# Patient Record
Sex: FEMALE | Race: WHITE | Employment: FULL TIME | URBAN - METROPOLITAN AREA
[De-identification: names, ages, dates, MRNs, and addresses within clinical notes are randomized per-mention and may not be internally consistent; named-entity substitution may affect disease eponyms.]

---

## 2023-10-26 ENCOUNTER — NURSING HOME VISIT (OUTPATIENT)
Dept: POST ACUTE CARE | Facility: EXTERNAL LOCATION | Age: 78
End: 2023-10-26
Payer: MEDICARE

## 2023-10-26 DIAGNOSIS — G40.909 SEIZURE DISORDER (MULTI): Primary | ICD-10-CM

## 2023-10-26 DIAGNOSIS — R53.81 PHYSICAL DECONDITIONING: ICD-10-CM

## 2023-10-26 DIAGNOSIS — K21.9 GASTROESOPHAGEAL REFLUX DISEASE WITHOUT ESOPHAGITIS: ICD-10-CM

## 2023-10-26 DIAGNOSIS — Z87.898 HISTORY OF ABDOMINAL ABSCESS: ICD-10-CM

## 2023-10-26 DIAGNOSIS — E78.5 HYPERLIPIDEMIA, UNSPECIFIED HYPERLIPIDEMIA TYPE: ICD-10-CM

## 2023-10-26 DIAGNOSIS — M15.9 PRIMARY OSTEOARTHRITIS INVOLVING MULTIPLE JOINTS: ICD-10-CM

## 2023-10-26 DIAGNOSIS — E03.9 HYPOTHYROIDISM, UNSPECIFIED TYPE: ICD-10-CM

## 2023-10-26 DIAGNOSIS — F32.9 MAJOR DEPRESSIVE DISORDER, REMISSION STATUS UNSPECIFIED, UNSPECIFIED WHETHER RECURRENT: ICD-10-CM

## 2023-10-26 PROBLEM — M15.0 PRIMARY OSTEOARTHRITIS INVOLVING MULTIPLE JOINTS: Status: ACTIVE | Noted: 2023-10-26

## 2023-10-26 PROCEDURE — 99308 SBSQ NF CARE LOW MDM 20: CPT | Performed by: NURSE PRACTITIONER

## 2023-10-26 RX ORDER — SIMVASTATIN 20 MG/1
1 TABLET, FILM COATED ORAL EVERY EVENING
COMMUNITY
Start: 2013-07-01

## 2023-10-26 RX ORDER — ESCITALOPRAM OXALATE 5 MG/1
1 TABLET ORAL DAILY
COMMUNITY
Start: 2022-01-20

## 2023-10-26 RX ORDER — LEVOTHYROXINE SODIUM 88 UG/1
1 TABLET ORAL DAILY
COMMUNITY

## 2023-10-26 RX ORDER — PANTOPRAZOLE SODIUM 40 MG/1
1 TABLET, DELAYED RELEASE ORAL DAILY
COMMUNITY
Start: 2022-04-05

## 2023-10-26 RX ORDER — POLYVINYL ALCOHOL 14 MG/ML
1 SOLUTION/ DROPS OPHTHALMIC 4 TIMES DAILY
COMMUNITY
End: 2024-01-30 | Stop reason: WASHOUT

## 2023-10-26 RX ORDER — CHOLECALCIFEROL (VITAMIN D3) 25 MCG
3000 TABLET ORAL DAILY
COMMUNITY

## 2023-10-26 RX ORDER — LACOSAMIDE 100 MG/1
100 TABLET ORAL EVERY 12 HOURS
COMMUNITY
Start: 2013-04-12 | End: 2024-03-11 | Stop reason: SDUPTHER

## 2023-10-26 RX ORDER — LEVETIRACETAM 1000 MG/1
1 TABLET ORAL 2 TIMES DAILY
COMMUNITY
Start: 2014-06-09 | End: 2024-03-11 | Stop reason: SDUPTHER

## 2023-10-26 ASSESSMENT — ENCOUNTER SYMPTOMS
ARTHRALGIAS: 1
TROUBLE SWALLOWING: 0
SHORTNESS OF BREATH: 0
DYSPHORIC MOOD: 0
UNEXPECTED WEIGHT CHANGE: 0
APPETITE CHANGE: 0
DYSURIA: 0
COUGH: 0
SEIZURES: 0
BACK PAIN: 1
WEAKNESS: 1
SLEEP DISTURBANCE: 0
ABDOMINAL PAIN: 1

## 2023-10-26 NOTE — PROGRESS NOTES
Reason for visit: fu seizures, ltac, oa, hypothyroid, phys deconditioning    Subjective   HPI:  Ms. Soliz is 78 y.o. year old female in [facility] under long term care .  Seen today in room, denies new co, says her knees are painful but going for injections next week. No abd pain, no bleeding. Working with therapy 3x per week, says she would like to eventually go home.     Review of Systems   Constitutional:  Negative for appetite change and unexpected weight change.   HENT:  Negative for trouble swallowing.    Respiratory:  Negative for cough and shortness of breath.    Cardiovascular:  Negative for chest pain and leg swelling.   Gastrointestinal:  Positive for abdominal pain.   Genitourinary:  Negative for dysuria.   Musculoskeletal:  Positive for arthralgias, back pain (when lies flat) and gait problem.   Neurological:  Positive for weakness. Negative for seizures.   Psychiatric/Behavioral:  Negative for dysphoric mood and sleep disturbance.        Objective   VS: reviewed in point click care Current Vitals     BP: 165/76 mmHg  10/21/2023 20:51    Temp:97.7 °F  10/21/2023 20:51  Pulse:85 bpm  10/21/2023 20:51    Weight:180 Lbs  10/3/2023 10:45  Resp:16 Breaths/min  10/21/2023 20:51  BS:198 mg/dL  7/30/2022 14:43  O2:97 %  10/7/2023 15:00  Pain:0  10/5/2023 21:09      Physical Exam  Constitutional:       General: She is not in acute distress.  HENT:      Head: Normocephalic and atraumatic.      Nose: Nose normal.      Mouth/Throat:      Mouth: Mucous membranes are moist.   Eyes:      Conjunctiva/sclera: Conjunctivae normal.   Pulmonary:      Effort: Pulmonary effort is normal.   Abdominal:      General: Abdomen is flat.   Musculoskeletal:      Cervical back: Normal range of motion and neck supple.      Right knee: Swelling and deformity present.      Left knee: Swelling and deformity present.   Skin:     General: Skin is warm and dry.   Neurological:      Mental Status: She is alert and oriented to person, place,  and time.   Psychiatric:         Mood and Affect: Mood normal.       Lab work  705008 TSH and T4 within normal limits  420584 glucose 124, creatinine 0.5 CBC was normal   132347 vitamin D normal  757031 HDL 44      Assessment/Plan   1. Seizure disorder (CMS/HCC)  Continue meds, no sz    2. Hypothyroidism, unspecified type  Last levels stable 491309, recheck 6 mo  Continue levothyroxine    3. Hyperlipidemia, unspecified hyperlipidemia type  Cont statin, last levels stable 6/2023, recheck 1 yr    4. Major depressive disorder, remission status unspecified, unspecified whether recurrent  Continue escitalopram  May have some worsening of depression if her hopes of going home are not realized, monitor    5. Gastroesophageal reflux disease without esophagitis  ppi    6. Primary osteoarthritis involving multiple joints  Plan for injections next week to knees  Tylenol prn not used often    7. Physical deconditioning  Says she will have therapy MWF    8. History of abdominal abscess  No current concerns      Med review  - reviewed in [facility] and updated in EPIC    Code status  [place] code status: DNRCCA    Dispo: stable, fu in 1 month or earlier prn for issues. Monitor for any increase in depression.     Monica Espinosa, APRN-CNP  10/26/23

## 2023-10-26 NOTE — LETTER
Patient: Shikha Soliz  : 1945    Encounter Date: 10/26/2023    Reason for visit: fu seizures, ltac, oa, hypothyroid, phys deconditioning    Subjective  HPI:  Ms. Soliz is 78 y.o. year old female in [facility] under long term care .  Seen today in room, denies new co, says her knees are painful but going for injections next week. No abd pain, no bleeding. Working with therapy 3x per week, says she would like to eventually go home.     Review of Systems   Constitutional:  Negative for appetite change and unexpected weight change.   HENT:  Negative for trouble swallowing.    Respiratory:  Negative for cough and shortness of breath.    Cardiovascular:  Negative for chest pain and leg swelling.   Gastrointestinal:  Positive for abdominal pain.   Genitourinary:  Negative for dysuria.   Musculoskeletal:  Positive for arthralgias, back pain (when lies flat) and gait problem.   Neurological:  Positive for weakness. Negative for seizures.   Psychiatric/Behavioral:  Negative for dysphoric mood and sleep disturbance.        Objective  VS: reviewed in point click care Current Vitals     BP: 165/76 mmHg  10/21/2023 20:51    Temp:97.7 °F  10/21/2023 20:51  Pulse:85 bpm  10/21/2023 20:51    Weight:180 Lbs  10/3/2023 10:45  Resp:16 Breaths/min  10/21/2023 20:51  BS:198 mg/dL  2022 14:43  O2:97 %  10/7/2023 15:00  Pain:0  10/5/2023 21:09      Physical Exam  Constitutional:       General: She is not in acute distress.  HENT:      Head: Normocephalic and atraumatic.      Nose: Nose normal.      Mouth/Throat:      Mouth: Mucous membranes are moist.   Eyes:      Conjunctiva/sclera: Conjunctivae normal.   Pulmonary:      Effort: Pulmonary effort is normal.   Abdominal:      General: Abdomen is flat.   Musculoskeletal:      Cervical back: Normal range of motion and neck supple.      Right knee: Swelling and deformity present.      Left knee: Swelling and deformity present.   Skin:     General: Skin is warm and dry.    Neurological:      Mental Status: She is alert and oriented to person, place, and time.   Psychiatric:         Mood and Affect: Mood normal.       Lab work  177662 TSH and T4 within normal limits  525462 glucose 124, creatinine 0.5 CBC was normal   624902 vitamin D normal  608402 HDL 44      Assessment/Plan  1. Seizure disorder (CMS/HCC)  Continue meds, no sz    2. Hypothyroidism, unspecified type  Last levels stable [id number], recheck 6 mo  Continue levothyroxine    3. Hyperlipidemia, unspecified hyperlipidemia type  Cont statin, last levels stable 6/2023, recheck 1 yr    4. Major depressive disorder, remission status unspecified, unspecified whether recurrent  Continue escitalopram  May have some worsening of depression if her hopes of going home are not realized, monitor    5. Gastroesophageal reflux disease without esophagitis  ppi    6. Primary osteoarthritis involving multiple joints  Plan for injections next week to knees  Tylenol prn not used often    7. Physical deconditioning  Says she will have therapy MWF    8. History of abdominal abscess  No current concerns      Med review  - reviewed in [facility] and updated in EPIC    Code status  [place] code status: DNRCCA    Dispo: stable, fu in 1 month or earlier prn for issues. Monitor for any increase in depression.     SEVEN Jama  10/26/23      Electronically Signed By: SEVEN Jama   10/26/23 12:06 PM

## 2023-11-03 ENCOUNTER — OFFICE VISIT (OUTPATIENT)
Dept: ORTHOPEDIC SURGERY | Facility: HOSPITAL | Age: 78
End: 2023-11-03
Payer: MEDICARE

## 2023-11-03 ENCOUNTER — HOSPITAL ENCOUNTER (OUTPATIENT)
Dept: RADIOLOGY | Facility: HOSPITAL | Age: 78
Discharge: HOME | End: 2023-11-03
Payer: MEDICARE

## 2023-11-03 VITALS — HEIGHT: 64 IN | WEIGHT: 167 LBS | BODY MASS INDEX: 28.51 KG/M2

## 2023-11-03 DIAGNOSIS — M15.9 PRIMARY OSTEOARTHRITIS INVOLVING MULTIPLE JOINTS: Primary | ICD-10-CM

## 2023-11-03 DIAGNOSIS — M15.9 PRIMARY OSTEOARTHRITIS INVOLVING MULTIPLE JOINTS: ICD-10-CM

## 2023-11-03 DIAGNOSIS — M17.10 ARTHRITIS OF KNEE: ICD-10-CM

## 2023-11-03 PROBLEM — G62.9 PERIPHERAL NEUROPATHY: Status: ACTIVE | Noted: 2023-11-03

## 2023-11-03 PROBLEM — I26.99 PULMONARY EMBOLISM (MULTI): Status: ACTIVE | Noted: 2023-11-03

## 2023-11-03 PROBLEM — M17.0 PRIMARY OSTEOARTHRITIS OF BOTH KNEES: Status: ACTIVE | Noted: 2023-11-03

## 2023-11-03 PROBLEM — K63.5 COLON POLYPS: Status: ACTIVE | Noted: 2023-11-03

## 2023-11-03 PROBLEM — F43.21 ADJUSTMENT DISORDER WITH DEPRESSED MOOD: Status: ACTIVE | Noted: 2023-11-03

## 2023-11-03 PROBLEM — H04.411: Status: ACTIVE | Noted: 2023-11-03

## 2023-11-03 PROBLEM — M48.061 LUMBAR STENOSIS: Status: ACTIVE | Noted: 2023-11-03

## 2023-11-03 PROBLEM — E11.9 DIABETES MELLITUS (MULTI): Status: ACTIVE | Noted: 2023-11-03

## 2023-11-03 PROBLEM — M17.12 LEFT KNEE DJD: Status: ACTIVE | Noted: 2023-11-03

## 2023-11-03 PROBLEM — C73 PAPILLARY CARCINOMA OF THYROID (MULTI): Status: ACTIVE | Noted: 2023-11-03

## 2023-11-03 PROBLEM — R56.9 CONVULSIONS (MULTI): Status: ACTIVE | Noted: 2023-11-03

## 2023-11-03 PROBLEM — F32.A ANXIETY AND DEPRESSION: Status: ACTIVE | Noted: 2023-11-03

## 2023-11-03 PROBLEM — R73.03 PREDIABETES: Status: ACTIVE | Noted: 2023-11-03

## 2023-11-03 PROBLEM — G20.C PARKINSONISM (MULTI): Status: ACTIVE | Noted: 2023-11-03

## 2023-11-03 PROBLEM — R35.0 URINARY FREQUENCY: Status: ACTIVE | Noted: 2023-11-03

## 2023-11-03 PROBLEM — L90.0 LICHEN SCLEROSUS ET ATROPHICUS: Status: ACTIVE | Noted: 2023-11-03

## 2023-11-03 PROBLEM — G45.9 TIA (TRANSIENT ISCHEMIC ATTACK): Status: ACTIVE | Noted: 2023-11-03

## 2023-11-03 PROBLEM — N64.4 BREAST PAIN: Status: ACTIVE | Noted: 2023-11-03

## 2023-11-03 PROBLEM — L30.4 INTERTRIGO: Status: ACTIVE | Noted: 2023-11-03

## 2023-11-03 PROBLEM — C73: Status: ACTIVE | Noted: 2023-11-03

## 2023-11-03 PROBLEM — M72.2 PLANTAR FASCIITIS: Status: ACTIVE | Noted: 2023-11-03

## 2023-11-03 PROBLEM — R26.89 POOR BALANCE: Status: ACTIVE | Noted: 2023-11-03

## 2023-11-03 PROBLEM — R33.9 URINARY RETENTION: Status: ACTIVE | Noted: 2023-11-03

## 2023-11-03 PROBLEM — R87.610 ASCUS WITH POSITIVE HIGH RISK HPV CERVICAL: Status: ACTIVE | Noted: 2023-11-03

## 2023-11-03 PROBLEM — M70.61 TROCHANTERIC BURSITIS OF RIGHT HIP: Status: ACTIVE | Noted: 2023-11-03

## 2023-11-03 PROBLEM — R05.8 UPPER AIRWAY COUGH SYNDROME: Status: ACTIVE | Noted: 2023-11-03

## 2023-11-03 PROBLEM — G89.28 PAIN, CHRONIC POSTOPERATIVE: Status: ACTIVE | Noted: 2023-11-03

## 2023-11-03 PROBLEM — E55.9 MILD VITAMIN D DEFICIENCY: Status: ACTIVE | Noted: 2023-11-03

## 2023-11-03 PROBLEM — R41.3 MEMORY LOSS, SHORT TERM: Status: ACTIVE | Noted: 2023-11-03

## 2023-11-03 PROBLEM — R29.898 WEAKNESS OF BOTH LOWER EXTREMITIES: Status: ACTIVE | Noted: 2023-11-03

## 2023-11-03 PROBLEM — E11.42 DIABETIC PERIPHERAL NEUROPATHY (MULTI): Status: ACTIVE | Noted: 2023-11-03

## 2023-11-03 PROBLEM — R81: Status: ACTIVE | Noted: 2023-11-03

## 2023-11-03 PROBLEM — R87.810 ASCUS WITH POSITIVE HIGH RISK HPV CERVICAL: Status: ACTIVE | Noted: 2023-11-03

## 2023-11-03 PROBLEM — G91.2 NPH (NORMAL PRESSURE HYDROCEPHALUS) (MULTI): Status: ACTIVE | Noted: 2023-11-03

## 2023-11-03 PROBLEM — N95.2 ATROPHY OF VAGINA: Status: ACTIVE | Noted: 2023-11-03

## 2023-11-03 PROBLEM — K65.1 INTRA-ABDOMINAL ABSCESS (MULTI): Status: ACTIVE | Noted: 2023-11-03

## 2023-11-03 PROBLEM — F41.9 ANXIETY AND DEPRESSION: Status: ACTIVE | Noted: 2023-11-03

## 2023-11-03 PROBLEM — M47.12 MYELOPATHY, SPONDYLOGENIC, CERVICAL: Status: ACTIVE | Noted: 2023-11-03

## 2023-11-03 PROBLEM — R73.9 HYPERGLYCEMIA: Status: ACTIVE | Noted: 2023-11-03

## 2023-11-03 PROBLEM — R26.9 ABNORMAL GAIT: Status: ACTIVE | Noted: 2023-11-03

## 2023-11-03 PROBLEM — R41.9 NEUROCOGNITIVE DISORDER: Status: ACTIVE | Noted: 2023-11-03

## 2023-11-03 PROBLEM — G93.89 CEREBRAL VENTRICULOMEGALY: Status: ACTIVE | Noted: 2023-11-03

## 2023-11-03 PROBLEM — M48.02 CERVICAL STENOSIS OF SPINE: Status: ACTIVE | Noted: 2023-11-03

## 2023-11-03 PROBLEM — H04.551 ACQUIRED STENOSIS OF RIGHT NASOLACRIMAL DUCT: Status: ACTIVE | Noted: 2023-11-03

## 2023-11-03 PROBLEM — Z98.1 S/P CERVICAL SPINAL FUSION: Status: ACTIVE | Noted: 2023-11-03

## 2023-11-03 PROBLEM — D72.819 WBC DECREASED: Status: ACTIVE | Noted: 2023-11-03

## 2023-11-03 PROBLEM — G40.109 TEMPORAL LOBE EPILEPSY (MULTI): Status: ACTIVE | Noted: 2023-11-03

## 2023-11-03 PROBLEM — E89.0 HYPOTHYROIDISM, POSTABLATIVE: Status: ACTIVE | Noted: 2023-11-03

## 2023-11-03 PROBLEM — M21.40 PES PLANUS: Status: ACTIVE | Noted: 2023-11-03

## 2023-11-03 PROCEDURE — 1125F AMNT PAIN NOTED PAIN PRSNT: CPT | Performed by: PHYSICIAN ASSISTANT

## 2023-11-03 PROCEDURE — 73560 X-RAY EXAM OF KNEE 1 OR 2: CPT | Mod: 50,FY

## 2023-11-03 PROCEDURE — 99213 OFFICE O/P EST LOW 20 MIN: CPT | Performed by: PHYSICIAN ASSISTANT

## 2023-11-03 PROCEDURE — 73560 X-RAY EXAM OF KNEE 1 OR 2: CPT | Mod: BILATERAL PROCEDURE | Performed by: RADIOLOGY

## 2023-11-03 PROCEDURE — 99213 OFFICE O/P EST LOW 20 MIN: CPT | Mod: 25 | Performed by: PHYSICIAN ASSISTANT

## 2023-11-03 PROCEDURE — 1036F TOBACCO NON-USER: CPT | Performed by: PHYSICIAN ASSISTANT

## 2023-11-03 RX ORDER — ACETAMINOPHEN 325 MG/1
650 TABLET ORAL EVERY 4 HOURS PRN
COMMUNITY
Start: 2022-04-05

## 2023-11-03 RX ORDER — ONDANSETRON 4 MG/1
1 TABLET, FILM COATED ORAL EVERY 6 HOURS PRN
COMMUNITY
Start: 2023-02-07

## 2023-11-03 RX ORDER — HYDROCORTISONE 1 %
CREAM (GRAM) TOPICAL 2 TIMES DAILY PRN
COMMUNITY

## 2023-11-03 RX ORDER — LEVOTHYROXINE SODIUM 112 UG/1
TABLET ORAL
COMMUNITY
Start: 2023-04-01 | End: 2024-01-30 | Stop reason: WASHOUT

## 2023-11-03 RX ORDER — LEVOTHYROXINE SODIUM 100 UG/1
TABLET ORAL
COMMUNITY
Start: 2023-07-01 | End: 2024-01-30 | Stop reason: WASHOUT

## 2023-11-03 RX ORDER — NYSTATIN 100000 [USP'U]/G
POWDER TOPICAL 2 TIMES DAILY PRN
COMMUNITY
Start: 2023-02-22

## 2023-11-03 RX ORDER — LEVOTHYROXINE SODIUM 50 UG/1
TABLET ORAL
COMMUNITY
Start: 2023-07-01 | End: 2024-01-30 | Stop reason: WASHOUT

## 2023-11-03 NOTE — PROGRESS NOTES
HPI  She is a 78-year-old female, with a past medical significant for osteoarthritis involving multiple joints, presents for bilateral knee pain. She states the pain has been ongoing for over a decade. She describes the pain as constant and achy. She admits to struggling to walk and move her knees at all. She was previously seen by Dr. Pabon who has since retired. She has had 5-6 steroid injections in both knees in the past with the most recent injection being roughly 3-4 months ago. They usually give her a few months of relief but the pain comes back. She has been in a motorized wheelchair for roughly the past 2 years due to her knee pain. She denies any recent trauma to her knees. She denies any surgical history specific to her knees. She states she is not interested in surgery but is more interested in injections to help with managing her pain.     Physical Exam:   Gen: The pt is A&Ox3, NAD, and appear state age and weight  Psychiatric: mood and affect are appropriate   Eyes: sclera are white, EOM grossly intact  ENT: MMM  Neck: supple, thyroid is midline  Respiratory: respirations are nonlabored, chest rise symmetric  CV: rate is regular by palpation of distal pulses  Abdomen: nondistended   Integument: no obvious cutaneous lesions noted. No signs of lymphangitis. No signs of systemic edema.   Knee Musculoskeletal Exam  Gait    Gait is non-ambulatory.    Palpation    Right      Crepitus: patellofemoral      Left      Crepitus: patellofemoral      Imaging  Bilateral x-rays of the knees were obtained in the office and reviewed by me today and show advanced b/l knee OA     Assessment:  A 78-year-old female, with a past medical significant for osteoarthritis involving multiple joints, presents for bilateral knee pain. X-rays show signs of osteoarthritis bilaterally. She has been seen by Dr. Cabrera in the past for steroid injections in bilateral knees with the most recent injections being 3-4 months ago. She is not  interested in surgical treatment at this time and is interested in continuing injections.     Plan:  - Gel injections in bilateral knees   - Submit pre-approval to insurance for gel injections   - Follow-up in 1-3 weeks pending insurance approval for injections    All of the pt's questions/concerns addressed and she is in agreement with the plan.

## 2023-11-21 ENCOUNTER — TELEPHONE (OUTPATIENT)
Dept: ORTHOPEDIC SURGERY | Facility: CLINIC | Age: 78
End: 2023-11-21

## 2023-11-21 DIAGNOSIS — M17.10 PRIMARY LOCALIZED OSTEOARTHROSIS OF LOWER LEG, UNSPECIFIED LATERALITY: ICD-10-CM

## 2023-11-21 NOTE — TELEPHONE ENCOUNTER
Called pt's  Mu Soliz, in regards to fax received from [employer] [facility] for Yadira auth. Per the form, Durolane is not preferred for pt's insurance plan.     The following medications are preferred:   Monovisc  Orthovisc  Synvisc  Synvisc-One    Due to the pt being in a SNF, the  would like to have a one time injection completed. Pt has not tried or failed any other viscosupplementation. Switching to Monovisc with 's approval.     Pended RX    Nahomy Curtis LPN

## 2023-11-28 ENCOUNTER — APPOINTMENT (OUTPATIENT)
Dept: ORTHOPEDIC SURGERY | Facility: HOSPITAL | Age: 78
End: 2023-11-28
Payer: MEDICARE

## 2023-12-05 ENCOUNTER — NURSING HOME VISIT (OUTPATIENT)
Dept: POST ACUTE CARE | Facility: EXTERNAL LOCATION | Age: 78
End: 2023-12-05
Payer: MEDICARE

## 2023-12-05 DIAGNOSIS — R26.9 GAIT DISORDER: ICD-10-CM

## 2023-12-05 DIAGNOSIS — E78.2 MIXED HYPERLIPIDEMIA: ICD-10-CM

## 2023-12-05 DIAGNOSIS — F32.A ANXIETY AND DEPRESSION: ICD-10-CM

## 2023-12-05 DIAGNOSIS — M48.061 SPINAL STENOSIS OF LUMBAR REGION, UNSPECIFIED WHETHER NEUROGENIC CLAUDICATION PRESENT: ICD-10-CM

## 2023-12-05 DIAGNOSIS — G40.909 SEIZURE DISORDER (MULTI): ICD-10-CM

## 2023-12-05 DIAGNOSIS — F41.9 ANXIETY AND DEPRESSION: ICD-10-CM

## 2023-12-05 DIAGNOSIS — M15.9 PRIMARY OSTEOARTHRITIS INVOLVING MULTIPLE JOINTS: ICD-10-CM

## 2023-12-05 DIAGNOSIS — R29.898 WEAKNESS OF BOTH LOWER EXTREMITIES: ICD-10-CM

## 2023-12-05 DIAGNOSIS — R53.81 PHYSICAL DECONDITIONING: ICD-10-CM

## 2023-12-05 DIAGNOSIS — E03.9 HYPOTHYROIDISM, UNSPECIFIED TYPE: Primary | ICD-10-CM

## 2023-12-05 DIAGNOSIS — R60.0 LEG EDEMA, LEFT: ICD-10-CM

## 2023-12-05 PROCEDURE — 99308 SBSQ NF CARE LOW MDM 20: CPT | Performed by: NURSE PRACTITIONER

## 2023-12-05 NOTE — PROGRESS NOTES
Reason for visit: fu     Subjective   HPI:  Ms. Soliz is 78 y.o. year old female in [facility] under long term care     Patient seen in room for follow-up visit, complains of left lower extremity edema and some mild tenderness in the leg.  She denies any trauma.  Per staff left lower extremity does swell at times, have been elevating.  No teds hose on.  Informed patient that  ultrasound of the leg to be done to rule out DVT.  Per staff interview and progress notes patient with noted weight gain in November.  No other new issues indicated.  Patient continues with therapy.    Review of Systems   Constitutional:  Negative for activity change, appetite change and unexpected weight change.   HENT:  Negative for dental problem and trouble swallowing.    Respiratory:  Negative for cough, shortness of breath and wheezing.    Cardiovascular:  Positive for leg swelling. Negative for chest pain and palpitations.   Gastrointestinal:  Negative for abdominal pain, constipation and diarrhea.   Genitourinary:  Negative for difficulty urinating.   Musculoskeletal:  Positive for arthralgias, back pain and gait problem.   Skin:  Negative for rash.   Neurological:  Positive for weakness. Negative for dizziness and light-headedness.   Psychiatric/Behavioral:  Negative for dysphoric mood and sleep disturbance.        Objective   VS: reviewed in point click care yes    Physical Exam  Vitals reviewed.   Constitutional:       General: She is not in acute distress.  HENT:      Head: Normocephalic and atraumatic.      Nose: Nose normal.      Mouth/Throat:      Mouth: Mucous membranes are moist.      Pharynx: Oropharynx is clear.   Eyes:      Conjunctiva/sclera: Conjunctivae normal.   Cardiovascular:      Rate and Rhythm: Normal rate and regular rhythm.      Pulses: Normal pulses.      Heart sounds: Normal heart sounds.   Pulmonary:      Effort: Pulmonary effort is normal.      Breath sounds: Normal breath sounds.   Abdominal:      General:  Bowel sounds are normal.      Palpations: Abdomen is soft.   Musculoskeletal:         General: Swelling (LLE non pitting) present. Normal range of motion.      Cervical back: Normal range of motion and neck supple.   Skin:     General: Skin is warm and dry.      Capillary Refill: Capillary refill takes less than 2 seconds.   Neurological:      Mental Status: She is alert. Mental status is at baseline.   Psychiatric:         Mood and Affect: Mood normal.       Lab work:  525110 TSH and T4 within normal limits  427470 CMP abnormals only glucose 124, creatinine 0.5, BUN/creatinine ratio 24 and CBC all within normal limits  502249 lipid profile abnormals only HDL 44 and A1c 6.0    Assessment/Plan     A. LLE edema  - will check US in light of pt immobility   -TEDS    1. Anxiety and depression  - stable on current meds    2. Seizure disorder (CMS/HCC)  - no sz activity, stable on current meds    3. Hypothyroidism, unspecified type  - last check 8/2023 wnl continue current doses     4. Physical deconditioning  -continues to work with therapy     5. Mixed hyperlipidemia  - last labs stable 6/2023    6. Primary osteoarthritis involving multiple joints  7. Weakness of both lower extremities  8. Spinal stenosis of lumbar region, unspecified whether neurogenic claudication present  9. Gait disorder  - plan for knee injections this month  - cont w therapy      Med review  - reviewed in [facility]    Code status  [place] code status: [facility]    Dispo: LLE edema, will check to rule out dvt.     TRIPP Jama-CNP  12/05/23

## 2023-12-05 NOTE — LETTER
Patient: Shikha Soliz  : 1945    Encounter Date: 2023    Reason for visit: fu     Subjective  HPI:  Ms. Soliz is 78 y.o. year old female in [facility] under long term care     Patient seen in room for follow-up visit, complains of left lower extremity edema and some mild tenderness in the leg.  She denies any trauma.  Per staff left lower extremity does swell at times, have been elevating.  No teds hose on.  Informed patient that  ultrasound of the leg to be done to rule out DVT.  Per staff interview and progress notes patient with noted weight gain in November.  No other new issues indicated.  Patient continues with therapy.    Review of Systems   Constitutional:  Negative for activity change, appetite change and unexpected weight change.   HENT:  Negative for dental problem and trouble swallowing.    Respiratory:  Negative for cough, shortness of breath and wheezing.    Cardiovascular:  Positive for leg swelling. Negative for chest pain and palpitations.   Gastrointestinal:  Negative for abdominal pain, constipation and diarrhea.   Genitourinary:  Negative for difficulty urinating.   Musculoskeletal:  Positive for arthralgias, back pain and gait problem.   Skin:  Negative for rash.   Neurological:  Positive for weakness. Negative for dizziness and light-headedness.   Psychiatric/Behavioral:  Negative for dysphoric mood and sleep disturbance.        Objective  VS: reviewed in point click care yes    Physical Exam  Vitals reviewed.   Constitutional:       General: She is not in acute distress.  HENT:      Head: Normocephalic and atraumatic.      Nose: Nose normal.      Mouth/Throat:      Mouth: Mucous membranes are moist.      Pharynx: Oropharynx is clear.   Eyes:      Conjunctiva/sclera: Conjunctivae normal.   Cardiovascular:      Rate and Rhythm: Normal rate and regular rhythm.      Pulses: Normal pulses.      Heart sounds: Normal heart sounds.   Pulmonary:      Effort: Pulmonary effort is normal.       Breath sounds: Normal breath sounds.   Abdominal:      General: Bowel sounds are normal.      Palpations: Abdomen is soft.   Musculoskeletal:         General: Swelling (LLE non pitting) present. Normal range of motion.      Cervical back: Normal range of motion and neck supple.   Skin:     General: Skin is warm and dry.      Capillary Refill: Capillary refill takes less than 2 seconds.   Neurological:      Mental Status: She is alert. Mental status is at baseline.   Psychiatric:         Mood and Affect: Mood normal.       Lab work:  698587 TSH and T4 within normal limits  410528 CMP abnormals only glucose 124, creatinine 0.5, BUN/creatinine ratio 24 and CBC all within normal limits  190432 lipid profile abnormals only HDL 44 and A1c 6.0    Assessment/Plan    A. LLE edema  - will check US in light of pt immobility   -TEDS    1. Anxiety and depression  - stable on current meds    2. Seizure disorder (CMS/HCC)  - no sz activity, stable on current meds    3. Hypothyroidism, unspecified type  - last check 8/2023 wnl continue current doses     4. Physical deconditioning  -continues to work with therapy     5. Mixed hyperlipidemia  - last labs stable 6/2023    6. Primary osteoarthritis involving multiple joints  7. Weakness of both lower extremities  8. Spinal stenosis of lumbar region, unspecified whether neurogenic claudication present  9. Gait disorder  - plan for knee injections this month  - cont w therapy      Med review  - reviewed in [facility]    Code [facility]  [place] [facility]    Dispo: LLE edema, will check to rule out dvt.     SEVEN Jama  12/05/23      Electronically Signed By: SEVEN Jama   12/11/23  9:34 AM

## 2023-12-11 ENCOUNTER — OFFICE VISIT (OUTPATIENT)
Dept: ORTHOPEDIC SURGERY | Facility: HOSPITAL | Age: 78
End: 2023-12-11
Payer: MEDICARE

## 2023-12-11 DIAGNOSIS — M17.0 PRIMARY OSTEOARTHRITIS OF BOTH KNEES: Primary | ICD-10-CM

## 2023-12-11 DIAGNOSIS — E11.42 DIABETIC PERIPHERAL NEUROPATHY (MULTI): ICD-10-CM

## 2023-12-11 PROCEDURE — 1125F AMNT PAIN NOTED PAIN PRSNT: CPT | Performed by: PHYSICIAN ASSISTANT

## 2023-12-11 PROCEDURE — 99214 OFFICE O/P EST MOD 30 MIN: CPT | Performed by: PHYSICIAN ASSISTANT

## 2023-12-11 PROCEDURE — 20610 DRAIN/INJ JOINT/BURSA W/O US: CPT | Performed by: PHYSICIAN ASSISTANT

## 2023-12-11 PROCEDURE — 2500000004 HC RX 250 GENERAL PHARMACY W/ HCPCS (ALT 636 FOR OP/ED): Mod: JZ | Performed by: PHYSICIAN ASSISTANT

## 2023-12-11 PROCEDURE — 1036F TOBACCO NON-USER: CPT | Performed by: PHYSICIAN ASSISTANT

## 2023-12-11 RX ADMIN — Medication 60 MG: at 10:45

## 2023-12-11 ASSESSMENT — ENCOUNTER SYMPTOMS
COUGH: 0
WHEEZING: 0
DYSPHORIC MOOD: 0
SLEEP DISTURBANCE: 0
PALPITATIONS: 0
LIGHT-HEADEDNESS: 0
ARTHRALGIAS: 1
APPETITE CHANGE: 0
ACTIVITY CHANGE: 0
DIZZINESS: 0
DIFFICULTY URINATING: 0
TROUBLE SWALLOWING: 0
BACK PAIN: 1
DIARRHEA: 0
CONSTIPATION: 0
ABDOMINAL PAIN: 0
SHORTNESS OF BREATH: 0
UNEXPECTED WEIGHT CHANGE: 0
WEAKNESS: 1

## 2023-12-11 NOTE — PROGRESS NOTES
Patient is a 78 y.o. female with medical history significant for   Patient Active Problem List   Diagnosis    Hypothyroidism    Seizure disorder (CMS/HCC)    Hyperlipidemia    Major depressive disorder    Gastroesophageal reflux disease without esophagitis    Physical deconditioning    Primary osteoarthritis involving multiple joints    History of abdominal abscess    ASCUS with positive high risk HPV cervical    Gait disorder    Acquired stenosis of right nasolacrimal duct    Adjustment disorder with depressed mood    Atrophy of vagina    Breast pain    Chronic dacryocystitis of right lacrimal passage    Chronic dacryocystitis of right lacrimal sac    Colon polyps    Diabetic peripheral neuropathy (CMS/HCC)    Diabetes mellitus (CMS/HCC)    Hyperglycemia    Intra-abdominal abscess (CMS/HCC)    Intertrigo    Left knee DJD    Lichen sclerosus et atrophicus    Cervical stenosis of spine    Lumbar stenosis    Myelopathy, spondylogenic, cervical    Memory loss, short term    Mild vitamin D deficiency    Neurocognitive disorder    NPH (normal pressure hydrocephalus) (CMS/HCC)    Osteoarthrosis, localized, primary, knee    Pain, chronic postoperative    Papillary carcinoma of thyroid (CMS/HCC)    Follicular adenocarcinoma of thyroid gland (CMS/HCC)    Parkinsonism    Peripheral neuropathy    Pes planus    Plantar fasciitis    Poor balance    Prediabetes    Primary osteoarthritis of both knees    Pulmonary embolism (CMS/HCC)    S/P cervical spinal fusion    Sugar urinary present    Temporal lobe epilepsy (CMS/HCC)    TIA (transient ischemic attack)    Trochanteric bursitis of right hip    Upper airway cough syndrome    Urinary frequency    Urinary retention    WBC decreased    Weakness of both lower extremities    Anxiety and depression    Convulsions (CMS/HCC)    Cerebral ventriculomegaly    who presents for follow up of her bilateral knee pain due to arthritis.  Patient states that this has caused pain for several years  with symptoms continuing to worsen and she has failed cortisone, it only helps her for a few weeks.  The patient was approved for Durolane viscosupplementation injections and presents today. The patient denies recent injury or trauma to the bilateral knees,  denies locking or catching, denies fever/chills, N/T or calf pain. The patient resides in SNF and  is with her today.     ROS: All other systems have been reviewed and are negative except as previously noted in history of present illness.    Gen: The patient is alert and oriented ×3, is in no acute distress, and appear their stated age and weight.  Psychiatric: Mood and affect are appropriate.  Eyes: Sclera are white, and pupils are round and symmetric.  ENT: Mucous membranes are moist.   Neck: Supple. Thyroid is midline.  Respiratory: Respirations are nonlabored, chest rise is symmetric.  Cardiac: Rate is regular by palpation of distal pulses.   Abdomen: Nondistended.  Integument: No obvious cutaneous lesions are noted. No signs of lymphangitis. No signs of systemic edema.    Musculoskeletal: bilateral knees  skin intact, no wounds or breakdown noted  mild lower leg edema  TTP joint line medially, left worse than right today  no knee effusion noted  compartments soft  no calf tenderness  sensation intact to light touch  motor intact including TA/GS/EHL  palpable DP/PT pulses 2+  stable to valgus/varus stress exams at 30 degrees of flexion  negative Lachmans and posterior drawer  Positive patellar crepitus  knee motion: 5-95 degrees, slight flexion contracture noted     XR: Previous images of bilateral knees reviewed personally by me today and reveal tricompartmental arthritis with joint space narrowing noted in medial and patellofemoral compartments, osteophyte formation and varus angulation. The patient's recent knee imaging can be classified as a Grade 4 on the Kellgren Geoffrey Scale for radiographic classification of osteoarthritis. Grade 4 is severe  knee OA with large osteophytes, marked narrowing of joint space, severe sclerosis and definite deformity of bone ends.     IMP:  Problem List Items Addressed This Visit       Diabetic peripheral neuropathy (CMS/HCC)    Primary osteoarthritis of both knees - Primary          DISCUSSION: I discussed the conservative treatment options for osteoarthritis including physical therapy, NSAIDs and corticosteroid injection and briefly discussed indications for surgery. Patient should try to delay joint replacement surgery for as long as possible. If the patient's joint problem affects their quality of life and cause significant restriction of their activities, they may want to consider joint replacement surgery. I reviewed the importance for adopting a low impact lifestyle and avoidance of joint overloading activities. I explained the risks and benefits of the injection.  Patient verbalized understanding and wishes to proceed with Durolane injection for the bilateral knees today.     Patient ID: Shkiha Soliz is a 78 y.o. female.    L Inj/Asp: bilateral knee on 12/11/2023 10:45 AM  Indications: pain  Details: 21 G needle, anterolateral approach  Medications (Right): 60 mg sodium hyaluronate 60 mg/3 mL  Medications (Left): 60 mg sodium hyaluronate 60 mg/3 mL  Outcome: tolerated well, no immediate complications  Procedure, treatment alternatives, risks and benefits explained, specific risks discussed. Consent was given by the patient. Immediately prior to procedure a time out was called to verify the correct patient, procedure, equipment, support staff and site/side marked as required. Patient was prepped and draped in the usual sterile fashion.         PLAN:  Patient should observe for signs of infection and/or adverse reactions (redness, significant increase in pain, fever, nausea or vomiting) after receiving an injection today. If you develop any of the above symptoms, please contact my office. Patient should see the  maximum effect in approximately 6 weeks and can receive another Durolane injection no sooner than 6 months from today. Patient will continue with activities as tolerated. Mobilize to prevent stiffness.  Follow up in 6 months, no x-rays needed. All questions were answered.

## 2023-12-11 NOTE — PATIENT INSTRUCTIONS
Patient should observe for signs of infection and/or adverse reactions (redness, significant increase in pain, fever, nausea or vomiting) after receiving an injection today. If you develop any of the above symptoms, please contact my office. Patient should see the maximum effect in approximately 6 weeks and can receive another Durolane injection no sooner than 6 months from today. Patient will continue with activities as tolerated. Mobilize to prevent stiffness.  Follow up in 6 months, no x-rays needed. All questions were answered.

## 2024-01-03 NOTE — TELEPHONE ENCOUNTER
Pt's  approved Durolane to be shipped to the office. Pt just had Durolane injected by Katie Curtis LPN

## 2024-01-30 ENCOUNTER — NURSING HOME VISIT (OUTPATIENT)
Dept: POST ACUTE CARE | Facility: EXTERNAL LOCATION | Age: 79
End: 2024-01-30
Payer: MEDICARE

## 2024-01-30 DIAGNOSIS — K21.9 GASTROESOPHAGEAL REFLUX DISEASE WITHOUT ESOPHAGITIS: ICD-10-CM

## 2024-01-30 DIAGNOSIS — F41.9 ANXIETY AND DEPRESSION: ICD-10-CM

## 2024-01-30 DIAGNOSIS — R73.03 PREDIABETES: ICD-10-CM

## 2024-01-30 DIAGNOSIS — R26.9 GAIT DISORDER: ICD-10-CM

## 2024-01-30 DIAGNOSIS — Z87.898 HISTORY OF ABDOMINAL ABSCESS: ICD-10-CM

## 2024-01-30 DIAGNOSIS — M48.061 SPINAL STENOSIS OF LUMBAR REGION, UNSPECIFIED WHETHER NEUROGENIC CLAUDICATION PRESENT: ICD-10-CM

## 2024-01-30 DIAGNOSIS — L89.613 PRESSURE ULCER OF RIGHT HEEL, STAGE 3 (MULTI): ICD-10-CM

## 2024-01-30 DIAGNOSIS — R41.9 NEUROCOGNITIVE DISORDER: ICD-10-CM

## 2024-01-30 DIAGNOSIS — G40.909 SEIZURE DISORDER (MULTI): Primary | ICD-10-CM

## 2024-01-30 DIAGNOSIS — E03.9 HYPOTHYROIDISM, UNSPECIFIED TYPE: ICD-10-CM

## 2024-01-30 DIAGNOSIS — E78.2 MIXED HYPERLIPIDEMIA: ICD-10-CM

## 2024-01-30 DIAGNOSIS — R29.898 WEAKNESS OF BOTH LOWER EXTREMITIES: ICD-10-CM

## 2024-01-30 DIAGNOSIS — F32.A ANXIETY AND DEPRESSION: ICD-10-CM

## 2024-01-30 PROBLEM — L90.0 LICHEN SCLEROSUS ET ATROPHICUS: Status: RESOLVED | Noted: 2023-11-03 | Resolved: 2024-01-30

## 2024-01-30 PROBLEM — R33.9 URINARY RETENTION: Status: RESOLVED | Noted: 2023-11-03 | Resolved: 2024-01-30

## 2024-01-30 PROBLEM — G91.2 NPH (NORMAL PRESSURE HYDROCEPHALUS) (MULTI): Status: RESOLVED | Noted: 2023-11-03 | Resolved: 2024-01-30

## 2024-01-30 PROBLEM — R56.9 CONVULSIONS (MULTI): Status: RESOLVED | Noted: 2023-11-03 | Resolved: 2024-01-30

## 2024-01-30 PROBLEM — R53.81 PHYSICAL DECONDITIONING: Status: RESOLVED | Noted: 2023-10-26 | Resolved: 2024-01-30

## 2024-01-30 PROBLEM — K63.5 COLON POLYPS: Status: RESOLVED | Noted: 2023-11-03 | Resolved: 2024-01-30

## 2024-01-30 PROBLEM — M17.12 LEFT KNEE DJD: Status: RESOLVED | Noted: 2023-11-03 | Resolved: 2024-01-30

## 2024-01-30 PROBLEM — R26.89 POOR BALANCE: Status: RESOLVED | Noted: 2023-11-03 | Resolved: 2024-01-30

## 2024-01-30 PROBLEM — R35.0 URINARY FREQUENCY: Status: RESOLVED | Noted: 2023-11-03 | Resolved: 2024-01-30

## 2024-01-30 PROBLEM — I26.99 PULMONARY EMBOLISM (MULTI): Status: RESOLVED | Noted: 2023-11-03 | Resolved: 2024-01-30

## 2024-01-30 PROBLEM — M17.10 OSTEOARTHROSIS, LOCALIZED, PRIMARY, KNEE: Status: RESOLVED | Noted: 2023-11-03 | Resolved: 2024-01-30

## 2024-01-30 PROBLEM — E11.9 DIABETES MELLITUS (MULTI): Status: RESOLVED | Noted: 2023-11-03 | Resolved: 2024-01-30

## 2024-01-30 PROBLEM — G89.28 PAIN, CHRONIC POSTOPERATIVE: Status: RESOLVED | Noted: 2023-11-03 | Resolved: 2024-01-30

## 2024-01-30 PROBLEM — L30.4 INTERTRIGO: Status: RESOLVED | Noted: 2023-11-03 | Resolved: 2024-01-30

## 2024-01-30 PROBLEM — D72.819 WBC DECREASED: Status: RESOLVED | Noted: 2023-11-03 | Resolved: 2024-01-30

## 2024-01-30 PROBLEM — F32.9 MAJOR DEPRESSIVE DISORDER: Status: RESOLVED | Noted: 2023-10-26 | Resolved: 2024-01-30

## 2024-01-30 PROBLEM — R81: Status: RESOLVED | Noted: 2023-11-03 | Resolved: 2024-01-30

## 2024-01-30 PROBLEM — K65.1 INTRA-ABDOMINAL ABSCESS (MULTI): Status: RESOLVED | Noted: 2023-11-03 | Resolved: 2024-01-30

## 2024-01-30 PROBLEM — R41.3 MEMORY LOSS, SHORT TERM: Status: RESOLVED | Noted: 2023-11-03 | Resolved: 2024-01-30

## 2024-01-30 PROBLEM — M70.61 TROCHANTERIC BURSITIS OF RIGHT HIP: Status: RESOLVED | Noted: 2023-11-03 | Resolved: 2024-01-30

## 2024-01-30 PROCEDURE — 99309 SBSQ NF CARE MODERATE MDM 30: CPT | Performed by: NURSE PRACTITIONER

## 2024-01-30 RX ORDER — CARBOXYMETHYLCELLULOSE SODIUM 5 MG/ML
1 SOLUTION/ DROPS OPHTHALMIC 4 TIMES DAILY
COMMUNITY

## 2024-01-30 ASSESSMENT — ENCOUNTER SYMPTOMS
ABDOMINAL DISTENTION: 0
SEIZURES: 0
ARTHRALGIAS: 1
DIZZINESS: 0
APPETITE CHANGE: 0
WHEEZING: 0
COUGH: 0
ABDOMINAL PAIN: 0
SHORTNESS OF BREATH: 0
DIFFICULTY URINATING: 1
HEADACHES: 0
WEAKNESS: 0
PALPITATIONS: 0
SLEEP DISTURBANCE: 0

## 2024-01-30 NOTE — LETTER
Patient: Shikha Soliz  : 1945    Encounter Date: 2024    Reason for visit: mo fu debility,  Hypothyroid, pre dm, seizures    Subjective  HPI: 78 y.o. year old female in [facility] under long term care       Patient seen in room for follow-up visit for hypothyroid, predm, seizures, debilty.  Pt says she no longer goes to therapy- staff reports it is completed due to lack of progress, pt is now using standing lift for transfer and that was point of therapy, walking goals are not clinically indicated per notes. Pt also upset that she is no longer getting a liner in her depends, dw staff, [place] decision to stop use as liners on top of depends makes it difficult to see if pt needs change. She co pain in the knees, chronic, gets injections. No other new issues today. Per staff report, pt gets up less often now, shola to use bathroom for bowel movements.  note from dietician shows wt gains, pt is still on boost glucose control, will continue for the protein intake to avoid muscle loss.     Review of Systems   Constitutional:  Negative for appetite change.   Respiratory:  Negative for cough, shortness of breath and wheezing.    Cardiovascular:  Negative for chest pain, palpitations and leg swelling.   Gastrointestinal:  Negative for abdominal distention and abdominal pain.   Genitourinary:  Positive for difficulty urinating (incont).   Musculoskeletal:  Positive for arthralgias (bilat knees).   Skin:  Negative for rash.   Neurological:  Negative for dizziness, seizures, weakness and headaches.   Psychiatric/Behavioral:  Negative for sleep disturbance.        Objective  VS: reviewed in point click care   Current Vitals     BP: 150/62 mmHg  2024 20:13    Temp:97.7 °F  2024 20:13  Pulse:62 bpm  2024 20:13    Weight:188.2 Lbs  2024 15:04  Resp:18 Breaths/min  2024 20:13  BS:198 mg/dL  2022 14:43  O2:96 %  2024 20:13  Pain:0  2024 20:13      Physical Exam  Vitals and nursing  note reviewed.   Constitutional:       General: She is not in acute distress.     Appearance: Normal appearance.      Comments: NAD   HENT:      Head: Normocephalic and atraumatic.      Comments: Hearing adequate for conversation     Nose: Nose normal.      Mouth/Throat:      Mouth: Mucous membranes are moist.      Pharynx: Oropharynx is clear.   Eyes:      Conjunctiva/sclera: Conjunctivae normal.   Cardiovascular:      Rate and Rhythm: Normal rate and regular rhythm.      Pulses: Normal pulses.      Heart sounds: Normal heart sounds.   Pulmonary:      Effort: Pulmonary effort is normal.      Breath sounds: Normal breath sounds.   Abdominal:      General: Abdomen is flat. Bowel sounds are normal.      Palpations: Abdomen is soft.   Musculoskeletal:         General: Swelling (B knees) present.      Cervical back: Normal range of motion and neck supple.   Skin:     General: Skin is warm and dry.      Capillary Refill: Capillary refill takes less than 2 seconds.      Findings: No rash.   Neurological:      General: No focal deficit present.      Mental Status: She is alert and oriented to person, place, and time.   Psychiatric:         Mood and Affect: Mood normal.         Judgment: Judgment is inappropriate (re: incont plans and pads).     Last labs over summer 2023    Assessment/Plan  1. Seizure disorder (CMS/HCC)  - no recent seizures, remains on Keppra and lacosamide    2. Neurocognitive disorder  - stable, having difficulty comprehending reason for stop of therapy and the use of liner in depends    3. Anxiety and depression  - on lexapro 5 mg, could consider increasing to see if pt would get more motivated to get oob    4. Spinal stenosis of lumbar region, unspecified whether neurogenic claudication present  5. Gait disorder  6. Weakness of both lower extremities, OA knees  - therapy done, can transfer with standing lift now   - continue ortho appts for knee injections.     7. Mixed hyperlipidemia  - continue on  statin, will check lipid panel in summer with other labs, last one done 6/2023    8. Gastroesophageal reflux disease without esophagitis  - continues on protonix 40 mg, could consider attempt to reduce in future (but may change lexapro for now and will not change >1 med unless required)     9. History of abdominal abscess  - no further issues     10. Prediabetes  - last A1c stable, check again with labs in June/July   - has gained some wt, using boost glucose control    11. Hypothyroidism, unspecified type  - stable last check, cont levothyroxine, check again June/July 2024    12. Possible NPH  - unclear if ever resolved, was seen by Dr. Lee in 2022 and plan for therapeutic LP but not done due to abd abscess issues. will dw MD     Med review  - reviewed in [facility] and updated in EPIC    Code status  [place] code status: DNRCCA    Dispo: will increase lexapro to 10 mg daily, discuss NPH status with MD. Labs planned for June/July 2024.     SEVEN Jama  01/30/24    Electronically Signed By: SEVEN Jama   1/30/24 12:44 PM

## 2024-01-30 NOTE — PROGRESS NOTES
Reason for visit: mo fu debility,  Hypothyroid, pre dm, seizures    Subjective   HPI: 78 y.o. year old female in [facility] under long term care       Patient seen in room for follow-up visit for hypothyroid, predm, seizures, debilty.  Pt says she no longer goes to therapy- staff reports it is completed due to lack of progress, pt is now using standing lift for transfer and that was point of therapy, walking goals are not clinically indicated per notes. Pt also upset that she is no longer getting a liner in her depends, dw staff, [place] decision to stop use as liners on top of depends makes it difficult to see if pt needs change. She co pain in the knees, chronic, gets injections. No other new issues today. Per staff report, pt gets up less often now, shola to use bathroom for bowel movements. 1/11 note from dietician shows wt gains, pt is still on boost glucose control, will continue for the protein intake to avoid muscle loss.     Review of Systems   Constitutional:  Negative for appetite change.   Respiratory:  Negative for cough, shortness of breath and wheezing.    Cardiovascular:  Negative for chest pain, palpitations and leg swelling.   Gastrointestinal:  Negative for abdominal distention and abdominal pain.   Genitourinary:  Positive for difficulty urinating (incont).   Musculoskeletal:  Positive for arthralgias (bilat knees).   Skin:  Negative for rash.   Neurological:  Negative for dizziness, seizures, weakness and headaches.   Psychiatric/Behavioral:  Negative for sleep disturbance.        Objective   VS: reviewed in point click care   Current Vitals     BP: 150/62 mmHg  1/1/2024 20:13    Temp:97.7 °F  1/1/2024 20:13  Pulse:62 bpm  1/1/2024 20:13    Weight:188.2 Lbs  1/1/2024 15:04  Resp:18 Breaths/min  1/1/2024 20:13  BS:198 mg/dL  7/30/2022 14:43  O2:96 %  1/1/2024 20:13  Pain:0  1/1/2024 20:13      Physical Exam  Vitals and nursing note reviewed.   Constitutional:       General: She is not in acute  distress.     Appearance: Normal appearance.      Comments: NAD   HENT:      Head: Normocephalic and atraumatic.      Comments: Hearing adequate for conversation     Nose: Nose normal.      Mouth/Throat:      Mouth: Mucous membranes are moist.      Pharynx: Oropharynx is clear.   Eyes:      Conjunctiva/sclera: Conjunctivae normal.   Cardiovascular:      Rate and Rhythm: Normal rate and regular rhythm.      Pulses: Normal pulses.      Heart sounds: Normal heart sounds.   Pulmonary:      Effort: Pulmonary effort is normal.      Breath sounds: Normal breath sounds.   Abdominal:      General: Abdomen is flat. Bowel sounds are normal.      Palpations: Abdomen is soft.   Musculoskeletal:         General: Swelling (B knees) present.      Cervical back: Normal range of motion and neck supple.   Skin:     General: Skin is warm and dry.      Capillary Refill: Capillary refill takes less than 2 seconds.      Findings: No rash.   Neurological:      General: No focal deficit present.      Mental Status: She is alert and oriented to person, place, and time.   Psychiatric:         Mood and Affect: Mood normal.         Judgment: Judgment is inappropriate (re: incont plans and pads).     Last labs over summer 2023    Assessment/Plan   1. Seizure disorder (CMS/HCC)  - no recent seizures, remains on Keppra and lacosamide    2. Neurocognitive disorder  - stable, having difficulty comprehending reason for stop of therapy and the use of liner in depends    3. Anxiety and depression  - on lexapro 5 mg, could consider increasing to see if pt would get more motivated to get oob, may want EKG prior as last qtc 455 in 2022.     4. Spinal stenosis of lumbar region, unspecified whether neurogenic claudication present  5. Gait disorder  6. Weakness of both lower extremities, OA knees  - therapy done, can transfer with standing lift now   - continue ortho appts for knee injections.     7. Mixed hyperlipidemia  - continue on statin, will check  lipid panel in summer with other labs, last one done 6/2023    8. Gastroesophageal reflux disease without esophagitis  - continues on protonix 40 mg, could consider attempt to reduce in future (but may change lexapro for now and will not change >1 med unless required)     9. History of abdominal abscess  - no further issues     10. Prediabetes  - last A1c stable, check again with labs in June/July   - has gained some wt, using boost glucose control    11. Hypothyroidism, unspecified type  - stable last check, cont levothyroxine, check again June/July 2024    12. Possible NPH  - unclear if ever resolved, was seen by Dr. Lee in 2022 and plan for therapeutic LP but not done due to abd abscess issues. will dw MD     Med review  - reviewed in [facility] and updated in EPIC    Code status  [place] code status: DNRCCA    Dispo: will check EKG and if qtc ok, increase lexapro to 10 mg daily, discuss NPH status with MD. Labs planned for June/July 2024.     Monica Espinosa, TRIPP-CNP  01/30/24

## 2024-03-04 ENCOUNTER — APPOINTMENT (OUTPATIENT)
Dept: NEUROLOGY | Facility: CLINIC | Age: 79
End: 2024-03-04
Payer: MEDICARE

## 2024-03-11 ENCOUNTER — TELEMEDICINE (OUTPATIENT)
Dept: NEUROLOGY | Facility: CLINIC | Age: 79
End: 2024-03-11
Payer: MEDICARE

## 2024-03-11 DIAGNOSIS — G40.909 SEIZURE DISORDER (MULTI): Primary | ICD-10-CM

## 2024-03-11 DIAGNOSIS — G20.C PARKINSONISM, UNSPECIFIED PARKINSONISM TYPE (MULTI): ICD-10-CM

## 2024-03-11 DIAGNOSIS — G62.89 OTHER POLYNEUROPATHY: ICD-10-CM

## 2024-03-11 DIAGNOSIS — G91.2 NPH (NORMAL PRESSURE HYDROCEPHALUS) (MULTI): ICD-10-CM

## 2024-03-11 DIAGNOSIS — G93.89 CEREBRAL VENTRICULOMEGALY: ICD-10-CM

## 2024-03-11 DIAGNOSIS — R41.9 NEUROCOGNITIVE DISORDER: ICD-10-CM

## 2024-03-11 DIAGNOSIS — R26.9 GAIT DISORDER: ICD-10-CM

## 2024-03-11 DIAGNOSIS — G45.9 TIA (TRANSIENT ISCHEMIC ATTACK): ICD-10-CM

## 2024-03-11 PROCEDURE — 1036F TOBACCO NON-USER: CPT | Performed by: PSYCHIATRY & NEUROLOGY

## 2024-03-11 PROCEDURE — 1159F MED LIST DOCD IN RCRD: CPT | Performed by: PSYCHIATRY & NEUROLOGY

## 2024-03-11 PROCEDURE — 99443 PR PHYS/QHP TELEPHONE EVALUATION 21-30 MIN: CPT | Performed by: PSYCHIATRY & NEUROLOGY

## 2024-03-11 PROCEDURE — 1125F AMNT PAIN NOTED PAIN PRSNT: CPT | Performed by: PSYCHIATRY & NEUROLOGY

## 2024-03-11 RX ORDER — LACOSAMIDE 100 MG/1
100 TABLET ORAL EVERY 12 HOURS
Qty: 60 TABLET | Refills: 5 | Status: SHIPPED | OUTPATIENT
Start: 2024-03-11 | End: 2025-03-11

## 2024-03-11 RX ORDER — LEVETIRACETAM 1000 MG/1
1000 TABLET ORAL 2 TIMES DAILY
Qty: 60 TABLET | Refills: 11 | Status: SHIPPED | OUTPATIENT
Start: 2024-03-11 | End: 2025-03-11

## 2024-03-11 ASSESSMENT — ENCOUNTER SYMPTOMS
DEPRESSION: 0
OCCASIONAL FEELINGS OF UNSTEADINESS: 0
LOSS OF SENSATION IN FEET: 0
SEIZURES: 1

## 2024-03-11 NOTE — PATIENT INSTRUCTIONS
The patient is essentially stable from a neurological standpoint.  The patient has had no seizures.  The patient should continue to take her Keppra and Vimpat.  I did refill both her Keppra and Vimpat.  The patient needs a CBC and liver function test done every 6 months while on these medicines.  The patient needs to get at least 8 hours sleep a night and avoid excessive alcohol intake.  The patient needs a polyneuropathy panel and I will reorder this.  The patient certainly continue physical and occupational therapy.  I did discuss the need for neuropsychiatric testing pre and post lumbar puncture to rule out the possibility of NPH.  I will hold on a neurosurgery consult at this time as I do not believe that they will offer her shunt given the fact that she has not been walking.  The patient and her  will let me know if she wishes to pursue the neuropsych testing.  The patient needs to stay well-hydrated.  The patient should continue aspirin and stroke risk factor modification.  I discussed all these issues in detail with the patient and her  and answered all their questions   The patient will follow up with me in 6 months.

## 2024-03-11 NOTE — PROGRESS NOTES
Subjective     Shikha Martínez 78 y.o.  HPI  The patient and her  both attend the appointment today.  The patient states that she has had no further seizures.  She has been compliant with her lacosamide and Keppra.  I did review her OARRS report.    The patient basically spends 21 out of 24 hours in bed and 3 of the 24-hour she is in a wheelchair.  The patient has not walked for quite a while.  The patient is not having any new neurological problems.  The patient did not get labs for the polyneuropathy panel.    Review of Systems   Neurological:  Positive for seizures.   All other systems reviewed and are negative.       Patient Active Problem List   Diagnosis    Hypothyroidism    Seizure disorder (CMS/HCC)    Hyperlipidemia    Gastroesophageal reflux disease without esophagitis    Primary osteoarthritis involving multiple joints    History of abdominal abscess    ASCUS with positive high risk HPV cervical    Gait disorder    Acquired stenosis of right nasolacrimal duct    Adjustment disorder with depressed mood    Atrophy of vagina    Breast pain    Chronic dacryocystitis of right lacrimal passage    Chronic dacryocystitis of right lacrimal sac    Diabetic peripheral neuropathy (CMS/HCC)    Hyperglycemia    Cervical stenosis of spine    Lumbar stenosis    Myelopathy, spondylogenic, cervical    Mild vitamin D deficiency    Neurocognitive disorder    Papillary carcinoma of thyroid (CMS/HCC)    Follicular adenocarcinoma of thyroid gland (CMS/HCC)    Parkinsonism    Peripheral neuropathy    Pes planus    Plantar fasciitis    Prediabetes    Primary osteoarthritis of both knees    S/P cervical spinal fusion    Temporal lobe epilepsy (CMS/HCC)    TIA (transient ischemic attack)    Upper airway cough syndrome    Weakness of both lower extremities    Anxiety and depression    Cerebral ventriculomegaly    Pressure ulcer of right heel, stage 3 (CMS/HCC)        Past Medical History:   Diagnosis Date    Abrasion, left  lesser toe(s), initial encounter 08/03/2021    Abrasion of toe of left foot, initial encounter    Colon polyps 11/03/2023    Convulsions (CMS/HCC) 11/03/2023    Decreased white blood cell count, unspecified 05/05/2020    WBC decreased    Disorder of lipoprotein metabolism, 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    Cholesterol serum decreased    Encounter for gynecological examination (general) (routine) without abnormal findings 10/17/2016    Encounter for cervical Pap smear with pelvic exam    Encounter for screening for malignant neoplasm of cervix     Encounter for Papanicolaou smear for cervical cancer screening    Encounter for screening for malignant neoplasm of colon 06/23/2015    Encounter for screening colonoscopy    Erythema intertrigo 10/17/2016    Intertrigo    Glycosuria 11/03/2021    Sugar urinary present    Intertrigo 11/03/2023    Intra-abdominal abscess (CMS/HCC) 11/03/2023    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    Lichen sclerosus et atrophicus 11/03/2023    Major depressive disorder 10/26/2023    Malignant neoplasm of thyroid gland (CMS/HCC) 06/13/2013    Malignant neoplasm of thyroid gland    NPH (normal pressure hydrocephalus) (CMS/HCC) 11/03/2023    Osteoarthrosis, localized, primary, knee 11/03/2023    Other conditions influencing health status 07/15/2015    History of cough    Other disorders of pituitary gland (CMS/HCC)     Empty sella syndrome    Other specified cough 03/16/2020    Upper airway cough syndrome    Other specified personal risk factors, not elsewhere classified 10/17/2016    Encounter for gynecologic examination for high-risk patient covered by Medicare    Other symptoms and signs involving the musculoskeletal system 10/12/2021    Weakness of both lower extremities    Pain in right hip 07/12/2021    Right hip pain    Pain in unspecified ankle and joints of unspecified foot 11/24/2014    Ankle pain    Pain in unspecified foot 11/24/2014    Heel pain    Pain in unspecified foot 08/02/2021    Foot pain     Pain in unspecified knee 12/03/2020    Knee pain    Pain in unspecified knee 11/04/2021    Joint pain, knee    Pain, unspecified 04/12/2022    Acute pain    Personal history of other diseases of the female genital tract 02/21/2017    History of breast pain    Personal history of other diseases of the nervous system and sense organs 02/25/2022    History of peripheral neuropathy    Personal history of other diseases of the nervous system and sense organs 11/24/2014    History of conjunctivitis    Personal history of other endocrine, nutritional and metabolic disease 06/29/2018    History of hyperglycemia    Personal history of other endocrine, nutritional and metabolic disease 11/03/2021    History of hyperglycemia    Personal history of other endocrine, nutritional and metabolic disease     History of hyperlipidemia    Personal history of other medical treatment     History of screening mammography    Personal history of other medical treatment 02/05/2020    History of screening mammography    Personal history of other specified conditions 04/24/2018    History of urinary frequency    Personal history of other specified conditions 04/28/2020    History of convulsions    Personal history of other specified conditions 10/12/2021    History of gait disorder    Personal history of urinary (tract) infections 09/05/2019    History of urinary tract infection    Physical deconditioning 10/26/2023    Poor balance 11/03/2023    Prediabetes 09/05/2019    Prediabetes    Prediabetes 10/08/2021    Pre-diabetes    Pulmonary embolism (CMS/HCC) 11/03/2023    Trochanteric bursitis of right hip 11/03/2023    Unspecified convulsions (CMS/HCC)     Seizures    Unspecified fracture of left toe(s), initial encounter for closed fracture 08/24/2021    Toe fracture, left    Unspecified injury of unspecified foot, initial encounter 04/12/2021    Toe injury        Past Surgical History:   Procedure Laterality Date    KNEE SURGERY  08/23/2013     Knee Surgery Left    MR HEAD ANGIO WO IV CONTRAST  10/10/2020    MR HEAD ANGIO WO IV CONTRAST 10/10/2020 [facility] ANCILLARY LEGACY    MR NECK ANGIO WO IV CONTRAST  10/10/2020    MR NECK ANGIO WO IV CONTRAST 10/10/2020 [facility]CILL[facility] LEGAC[facility]    OTHER SURGICAL HISTORY  02/25/2022    Neck surgery    TONSILLECTOMY  08/23/2013    Tonsillectomy    TOTAL THYROIDECTOMY  10/21/2013    Thyroid Surgery Total Thyroidectomy    TUBAL LIGATION  08/23/2013    Tubal Ligation    US GUIDED PERCUTANEOUS PERITONEAL OR RETROPERITONEAL FLUID COLLECTION DRAINAGE  8/1/2022    US GUIDED PERCUTANEOUS PERITONEAL OR RETROPERITONEAL FLUID COLLECTION DRAINAGE 8/1/2022 [facility] CLINICAL LEGACY        Social History     Socioeconomic History    Marital status:      Spouse name: Not on file    Number of children: Not on file    Years of education: Not on file    Highest education level: Not on file   Occupational History    Not on file   Tobacco Use    Smoking status: Never    Smokeless tobacco: Never   Substance and Sexual Activity    Alcohol use: Not Currently    Drug use: Never    Sexual activity: Not on file   Other Topics Concern    Not on file   Social History Narrative    Not on file     Social Determinants of Health     Financial Resource Strain: Not on file   Food Insecurity: Not on file   Transportation Needs: Not on file   Physical Activity: Not on file   Stress: Not on file   Social Connections: Not on file   Intimate Partner Violence: Not on file   Housing Stability: Not on file        Family History   Problem Relation Name Age of Onset    Memory loss Mother      Heart disease Father      Hyperlipidemia Father      Depression Daughter      Autism Son      Depression Son      Diabetes Maternal Grandmother          Current Outpatient Medications   Medication Instructions    acetaminophen (TYLENOL) 650 mg, oral, Every 4 hours PRN, DO NOT EXCEED 3000 MG IN 24 HOURS<BR>    carboxymethylcellulose (Refresh Plus) 0.5 % ophthalmic solution 1 drop,  Both Eyes, 4 times daily    cholecalciferol (VITAMIN D-3) 3,000 Units, oral, Daily    diclofenac sodium 1 % kit Every 6 hours,  apply 4 G to lower ext or 2G to upper ext up to 4x per day prn pain    escitalopram (Lexapro) 5 mg tablet 1 tablet, oral, Daily    eucerin cream Topical, 2 times daily, To affected areas     hydrocortisone 1 % cream Topical, 2 times daily PRN, To affected areas    lacosamide (VIMPAT) 100 mg, oral, Every 12 hours    levETIRAcetam (Keppra) 1,000 mg tablet 1 tablet, oral, 2 times daily    levothyroxine (Synthroid) 88 mcg tablet 1 tablet, oral, Daily    Nystop 100,000 unit/gram powder Apply topically 2 times a day as needed. To affected areas    ondansetron (Zofran) 4 mg tablet Take 1 tablet (4 mg) by mouth every 6 hours if needed for nausea.    pantoprazole (ProtoNix) 40 mg EC tablet 1 tablet, oral, Daily    simvastatin (Zocor) 20 mg tablet 1 tablet, oral, Every evening        Allergies   Allergen Reactions    Ciprofloxacin Itching and Other     Eye inflammation     Dilantin [Phenytoin Sodium Extended] Unknown    Codeine Rash, Other, Dizziness and Nausea/vomiting     Dyspepsia    Naproxen Hives and Rash    Phenytoin Rash        Objective  There were no vitals taken for this visit.   GENERAL APPEARANCE:  No distress, alert and cooperative.     MENTAL STATE:  Orientation was normal to time, place and person. Recent and remote memory was intact.  The patient is able to member 3 out of 3 objects at 5 minutes.  Attention span and concentration were normal. Language testing was normal for comprehension, repetition, expression, and naming. Calculation was intact. The patient could correctly interpret a picture, and copy a diagram. General fund of knowledge was intact.     CRANIAL NERVES:  Cranial nerves were normal.      CN 2- Visual Acuity  OD: 20/20 (corrected)   OS: 20/20 (corrected); visual fields full to confrontation.      CN 3, 4, 6-  Pupils round, 4 mm in diameter, equally reactive to light.  No ptosis. EOMs normal alignment, full range of movement, no nystagmus     CN 5- Facial sensation intact bilaterally. Normal corneal reflexes.      CN 7- Normal and symmetric facial strength. Nasolabial folds symmetric.     CN 8- Hearing intact to finger rub, whisper.      CN 9- Palate elevates symmetrically. Normal gag reflex.      CN 11- Normal strength of shoulder shrug and neck turning      CN 12- Tongue midline, with normal bulk and strength; no fasciculations.     MOTOR:  Motor exam was normal. Muscle bulk and tone were normal in both upper and lower extremities. Muscle strength was 5/5 in distal and proximal muscles in both upper and lower extremities. No fasciculations, tremor or other abnormal movements were present.     GAIT: The patient is currently bedbound.    Assessment/Plan   The patient is essentially stable from a neurological standpoint.  The patient has had no seizures.  The patient should continue to take her Keppra and Vimpat.  I did refill both her Keppra and Vimpat.  The patient needs a CBC and liver function test done every 6 months while on these medicines.  The patient needs to get at least 8 hours sleep a night and avoid excessive alcohol intake.  The patient needs a polyneuropathy panel and I will reorder this.  The patient certainly continue physical and occupational therapy.  I did discuss the need for neuropsychiatric testing pre and post lumbar puncture to rule out the possibility of NPH.  I will hold on a neurosurgery consult at this time as I do not believe that they will offer her shunt given the fact that she has not been walking.  The patient and her  will let me know if she wishes to pursue the neuropsych testing.  The patient needs to stay well-hydrated.  The patient should continue aspirin and stroke risk factor modification.  I discussed all these issues in detail with the patient and her  and answered all their questions   The patient will follow up with me in  6 months.

## 2024-03-20 ENCOUNTER — NURSING HOME VISIT (OUTPATIENT)
Dept: POST ACUTE CARE | Facility: EXTERNAL LOCATION | Age: 79
End: 2024-03-20
Payer: MEDICARE

## 2024-03-20 DIAGNOSIS — R73.03 PREDIABETES: ICD-10-CM

## 2024-03-20 DIAGNOSIS — M48.061 SPINAL STENOSIS OF LUMBAR REGION, UNSPECIFIED WHETHER NEUROGENIC CLAUDICATION PRESENT: ICD-10-CM

## 2024-03-20 DIAGNOSIS — E78.5 HYPERLIPIDEMIA, UNSPECIFIED HYPERLIPIDEMIA TYPE: ICD-10-CM

## 2024-03-20 DIAGNOSIS — K21.9 GASTROESOPHAGEAL REFLUX DISEASE WITHOUT ESOPHAGITIS: ICD-10-CM

## 2024-03-20 DIAGNOSIS — F41.9 ANXIETY AND DEPRESSION: ICD-10-CM

## 2024-03-20 DIAGNOSIS — Z86.711 HISTORY OF PULMONARY EMBOLUS (PE): ICD-10-CM

## 2024-03-20 DIAGNOSIS — F32.A ANXIETY AND DEPRESSION: ICD-10-CM

## 2024-03-20 DIAGNOSIS — E03.9 HYPOTHYROIDISM, UNSPECIFIED TYPE: ICD-10-CM

## 2024-03-20 DIAGNOSIS — R60.0 LEG EDEMA, LEFT: ICD-10-CM

## 2024-03-20 DIAGNOSIS — R26.9 GAIT DISORDER: ICD-10-CM

## 2024-03-20 DIAGNOSIS — G40.909 SEIZURE DISORDER (MULTI): Primary | ICD-10-CM

## 2024-03-20 DIAGNOSIS — R29.898 WEAKNESS OF BOTH LOWER EXTREMITIES: ICD-10-CM

## 2024-03-20 PROCEDURE — 99309 SBSQ NF CARE MODERATE MDM 30: CPT | Performed by: INTERNAL MEDICINE

## 2024-03-20 NOTE — LETTER
Patient: Shikha Soliz  : 1945    Encounter Date: 2024    SNF Course  77 Yo F with PMH of hypothyroidism- s/p thyroidectomy 2/2 thyroid cancer, peritonitis s/p peritoneal drain cx of fluid grew klebsiella , pseudomonas ornithinolytica and raoultella ; urine cx with morganella morganii, T2DM, seizure disorder, C3 T1 fusion depression, vit d insuff, . [facility] after per[facility]itis course. She was unable to fully gage function walking, and therefore was transitioned to LTC.     Neuro visited on 3/11/2024:  Assessment/Plan   The patient is essentially stable from a neurological standpoint.  The patient has had no seizures.  The patient should continue to take her Keppra and Vimpat.  I did refill both her Keppra and Vimpat.  The patient needs a CBC and liver function test done every 6 months while on these medicines.  The patient needs to get at least 8 hours sleep a night and avoid excessive alcohol intake.  The patient needs a polyneuropathy panel and I will reorder this.  The patient certainly continue physical and occupational therapy.  I did discuss the need for neuropsychiatric testing pre and post lumbar puncture to rule out the possibility of NPH.  I will hold on a neurosurgery consult at this time as I do not believe that they will offer her shunt given the fact that she has not been walking.  The patient and her  will let me know if she wishes to pursue the neuropsych testing.  The patient needs to stay well-hydrated.  The patient should continue aspirin and stroke risk factor modification.  I discussed all these issues in detail with the patient and her  and answered all their questions   The patient will follow up with me in 6 months.     Subjective   Today, pt seen in her room. She states she wants physical therapy again to gain back function in her legs to stand up again. As per chart review, she has tried PT but unable to progress, and in the past, PT was  reconsulted but patient would do it only for a couple of days and then stop on her own and then cycle would continue. She also complaints of constipation. She goes aorund 2x/week. She agrees to start medication, Senna, to help her have more regular bowel movements.    -Mobility (walking/falls): no falls, but unable to walk. She is wheelchair bound.  -Nutrition (weight loss/gain): fluctuates but overall weight gain from admission in 2022, around 10-20 lbs. States appetite is good, but depends on food.  -Mood/behavioural changes:  denies any depression or anxiety.  -No dizziness or lightheadedness or spinning sensation  -no issues with urination  -constipation: as above  -denies any chest pain or abdominal pain at this time. No nausea.     -bADLs- requires assistance with transfers, bathing, dressing and toileting with transfers. Cannot walk.   -iADLs- dependent on all.    Medication List  Lexapro 5mg daily  Levothyroxine 88 mcg daily   Protonix 40mg daily  Simvastatin 20mg daily   Vit D3 1000U daily  Keppra 1000mg BID  Lacosamide 100mg BID  Zofran 4mg q6h PRN     ROS  Patient denies most ROS questions, although unlikely accurate as pt is a proper historian.     Objective   Vitals  Weight:                     189.0 3/1/2024 09:58  Blood Pressure:  132 /  77 3/12/2024 02:33  Temperature:                     97.5 3/12/2024 02:33  Pulse:                                  62 3/12/2024 02:33  Respirations:                      18 3/12/2024 02:33  Blood Sugar:                        198.0 7/30/2022 14:43  O2 sats:                      96.0 % 3/12/2024 02:33  Height:                                   65.0 8/12/2022 21:57  Pain Level:                          0 1/1/2024 20:13    Physical Exam  Constitutional:       General: She is not in acute distress.     Appearance: Normal appearance.      Comments: NAD   HENT:      Head: Normocephalic and atraumatic.      Comments: Hearing adequate for conversation     Nose: Nose normal.       Mouth/Throat:      Mouth: Mucous membranes are moist.      Pharynx: Oropharynx is clear.   Eyes:      Conjunctiva/sclera: Conjunctivae normal.   Cardiovascular:      Rate and Rhythm: Normal rate and regular rhythm.      Pulses: Normal pulses.      Heart sounds: Normal heart sounds.   Pulmonary:      Effort: Pulmonary effort is normal.      Breath sounds: Normal breath sounds.   Abdominal:      General: Abdomen is flat. Bowel sounds are normal.      Palpations: Abdomen is soft.   Musculoskeletal:         General: Swelling b/l legs,L >R present.  Pitting edema of +2     Cervical back: Normal range of motion and neck supple.   Skin:     General: Skin is warm and dry.      Capillary Refill: Capillary refill takes less than 2 seconds.      Findings: No rash.   Neurological:      General: No focal deficit present.      Mental Status: She is alert and oriented to person, place, and time.   Psychiatric:         Mood and Affect: Mood normal.         Judgment: Judgment is good.    Labs/Imaging  No new labs. Last ones done 08/2023.    Assessment/Plan     1. Seizure disorder  - no recent seizures,   -neuro f/up appreciated, pt stable and to remain on Keppra 1g BID and lacosamide 100mg BID, as per neuro MD, The patient needs a CBC and liver function test done every 6 months while on these medicines.     2. Neurocognitive disorder  - stable, having difficulty comprehending reason for stop of therapy and the use of liner in diaper  - currently AAOx2.5- perosn, place, month, year only.  -dependent on all iADLs, and requires asssitance to all bADLs 2/2 decreased physical strength and inability to walk.      3. Anxiety and depression  - on lexapro 5 mg daily  -currently mood is good, denies depression or anxiety  -will maintain current dose. Higher dose may have cause sedation in past.      4. Spinal stenosis of lumbar region, unspecified whether neurogenic claudication present  5. Gait disorder  6. Weakness of both lower  "extremities, OA knees  - therapy done, can transfer with standing lift now   - continue ortho appts for knee injections.   -pt would like to work with PT again on sit to stand and ambulation. Of note, she has had recurring cycles of interest in working with therapy. She will work with therapy for a while and makes progress. But then will go back to not wanting to get up for transfers using sit to stand and then loses the gains she acquired. Will make referral to PT again      7. Mixed hyperlipidemia  - continue on simvastatin 20mg daily, will check lipid panel in summer with other labs, last one done 6/2023, showed LDL 76, .     8. Gastroesophageal reflux disease without esophagitis  - continue on protonix 40 mg daily. Consider decreasing dose to 20mg daily in future     9. Constipation  - current BM patterns of 2x/week.  -will start senna 8.6mg nightly for now and increase/add miralax as required.     10. Prediabetes  - last A1c stable in 8/2023 showed 6.0, check again with labs in June/July   - has gained some wt, around 20 lbs since last summer, using boost glucose control  -Polyneuropathy panel ordered by Neuro MD in past.      11. Hypothyroidism, unspecified type  - stable last check, cont levothyroxine 88mcg, will repeat TFTs again     12. Possible NPH  -as per neuro note on 3/2024: \"I did discuss the need for neuropsychiatric testing pre and post lumbar puncture to rule out the possibility of NPH. I will hold on a neurosurgery consult at this time as I do not believe that they will offer her shunt given the fact that she has not been walking. Patient and  to let him know after discussing it.\"  - I do not think she has NPH. Her lack of walking is not necessarily from neurologic difficulty with ambulation. It was due to generalized weakness and decreased motivation. She has been going through cycles of working with PT and making gains, but then declining functionally again due to lack of motivation " with sit to stand on transfers and ambulation.    -as per neuro MD, also to continue aspirin 81mg daily for stroke prevention, although patient has not been on it while here.    13. PE h/o  14. LE edema  -s/p eliquis 5mg BID for about 1 year  - has intermittently had increased leg swelling, left greater than right.  -2 dopplers, including 2 weeks ago, negative since stopping AC  -will continue off AC for now      Code status  [place] code status: DNRCCA    Mari Castillo MD  Geriatric Fellow     Attending Addendum:  Chart reviewed, patient examined, labs/imaging reviewed, and key elements of the history and physical examination of the patient confirmed.  I reviewed the fellow's note and made edits where needed in italics. I agree with the documented findings and plan of care.    Linda Meek MD        Electronically Signed By: Linda Meek MD   3/21/24 11:52 AM

## 2024-03-20 NOTE — PROGRESS NOTES
SNF Course  79 Yo F with PMH of hypothyroidism- s/p thyroidectomy 2/2 thyroid cancer, peritonitis s/p peritoneal drain cx of fluid grew klebsiella , pseudomonas ornithinolytica and raoultella ; urine cx with morganella morganii, T2DM, seizure disorder, C3 T1 fusion depression, vit d insuff, . [facility]ed [facility] after peritonitis course. She was unable to fully gage function walking, and therefore was transitioned to LTC.     Neuro visited on 3/11/2024:  Assessment/Plan   The patient is essentially stable from a neurological standpoint.  The patient has had no seizures.  The patient should continue to take her Keppra and Vimpat.  I did refill both her Keppra and Vimpat.  The patient needs a CBC and liver function test done every 6 months while on these medicines.  The patient needs to get at least 8 hours sleep a night and avoid excessive alcohol intake.  The patient needs a polyneuropathy panel and I will reorder this.  The patient certainly continue physical and occupational therapy.  I did discuss the need for neuropsychiatric testing pre and post lumbar puncture to rule out the possibility of NPH.  I will hold on a neurosurgery consult at this time as I do not believe that they will offer her shunt given the fact that she has not been walking.  The patient and her  will let me know if she wishes to pursue the neuropsych testing.  The patient needs to stay well-hydrated.  The patient should continue aspirin and stroke risk factor modification.  I discussed all these issues in detail with the patient and her  and answered all their questions   The patient will follow up with me in 6 months.     Subjective   Today, pt seen in her room. She states she wants physical therapy again to gain back function in her legs to stand up again. As per chart review, she has tried PT but unable to progress, and in the past, PT was reconsulted but patient would do it only for a couple of days and then  stop on her own and then cycle would continue. She also complaints of constipation. She goes aorund 2x/week. She agrees to start medication, Senna, to help her have more regular bowel movements.    -Mobility (walking/falls): no falls, but unable to walk. She is wheelchair bound.  -Nutrition (weight loss/gain): fluctuates but overall weight gain from admission in 2022, around 10-20 lbs. States appetite is good, but depends on food.  -Mood/behavioural changes:  denies any depression or anxiety.  -No dizziness or lightheadedness or spinning sensation  -no issues with urination  -constipation: as above  -denies any chest pain or abdominal pain at this time. No nausea.     -bADLs- requires assistance with transfers, bathing, dressing and toileting with transfers. Cannot walk.   -iADLs- dependent on all.    Medication List  Lexapro 5mg daily  Levothyroxine 88 mcg daily   Protonix 40mg daily  Simvastatin 20mg daily   Vit D3 1000U daily  Keppra 1000mg BID  Lacosamide 100mg BID  Zofran 4mg q6h PRN     ROS  Patient denies most ROS questions, although unlikely accurate as pt is a proper historian.     Objective   Vitals  Weight:                     189.0 3/1/2024 09:58  Blood Pressure:  132 /  77 3/12/2024 02:33  Temperature:                     97.5 3/12/2024 02:33  Pulse:                                  62 3/12/2024 02:33  Respirations:                      18 3/12/2024 02:33  Blood Sugar:                        198.0 7/30/2022 14:43  O2 sats:                      96.0 % 3/12/2024 02:33  Height:                                   65.0 8/12/2022 21:57  Pain Level:                          0 1/1/2024 20:13    Physical Exam  Constitutional:       General: She is not in acute distress.     Appearance: Normal appearance.      Comments: NAD   HENT:      Head: Normocephalic and atraumatic.      Comments: Hearing adequate for conversation     Nose: Nose normal.      Mouth/Throat:      Mouth: Mucous membranes are moist.      Pharynx:  Oropharynx is clear.   Eyes:      Conjunctiva/sclera: Conjunctivae normal.   Cardiovascular:      Rate and Rhythm: Normal rate and regular rhythm.      Pulses: Normal pulses.      Heart sounds: Normal heart sounds.   Pulmonary:      Effort: Pulmonary effort is normal.      Breath sounds: Normal breath sounds.   Abdominal:      General: Abdomen is flat. Bowel sounds are normal.      Palpations: Abdomen is soft.   Musculoskeletal:         General: Swelling b/l legs,L >R present.  Pitting edema of +2     Cervical back: Normal range of motion and neck supple.   Skin:     General: Skin is warm and dry.      Capillary Refill: Capillary refill takes less than 2 seconds.      Findings: No rash.   Neurological:      General: No focal deficit present.      Mental Status: She is alert and oriented to person, place, and time.   Psychiatric:         Mood and Affect: Mood normal.         Judgment: Judgment is good.    Labs/Imaging  No new labs. Last ones done 08/2023.    Assessment/Plan     1. Seizure disorder  - no recent seizures,   -neuro f/up appreciated, pt stable and to remain on Keppra 1g BID and lacosamide 100mg BID, as per neuro MD, The patient needs a CBC and liver function test done every 6 months while on these medicines.     2. Neurocognitive disorder  - stable, having difficulty comprehending reason for stop of therapy and the use of liner in diaper  - currently AAOx2.5- donald, place, month, year only.  -dependent on all iADLs, and requires asssitance to all bADLs 2/2 decreased physical strength and inability to walk.      3. Anxiety and depression  - on lexapro 5 mg daily  -currently mood is good, denies depression or anxiety  -will maintain current dose. Higher dose may have cause sedation in past.      4. Spinal stenosis of lumbar region, unspecified whether neurogenic claudication present  5. Gait disorder  6. Weakness of both lower extremities, OA knees  - therapy done, can transfer with standing lift now   -  "continue ortho appts for knee injections.   -pt would like to work with PT again on sit to stand and ambulation. Of note, she has had recurring cycles of interest in working with therapy. She will work with therapy for a while and makes progress. But then will go back to not wanting to get up for transfers using sit to stand and then loses the gains she acquired. Will make referral to PT again      7. Mixed hyperlipidemia  - continue on simvastatin 20mg daily, will check lipid panel in summer with other labs, last one done 6/2023, showed LDL 76, .     8. Gastroesophageal reflux disease without esophagitis  - continue on protonix 40 mg daily. Consider decreasing dose to 20mg daily in future     9. Constipation  - current BM patterns of 2x/week.  -will start senna 8.6mg nightly for now and increase/add miralax as required.     10. Prediabetes  - last A1c stable in 8/2023 showed 6.0, check again with labs in June/July   - has gained some wt, around 20 lbs since last summer, using boost glucose control  -Polyneuropathy panel ordered by Neuro MD in past.      11. Hypothyroidism, unspecified type  - stable last check, cont levothyroxine 88mcg, will repeat TFTs again     12. Possible NPH  -as per neuro note on 3/2024: \"I did discuss the need for neuropsychiatric testing pre and post lumbar puncture to rule out the possibility of NPH. I will hold on a neurosurgery consult at this time as I do not believe that they will offer her shunt given the fact that she has not been walking. Patient and  to let him know after discussing it.\"  - I do not think she has NPH. Her lack of walking is not necessarily from neurologic difficulty with ambulation. It was due to generalized weakness and decreased motivation. She has been going through cycles of working with PT and making gains, but then declining functionally again due to lack of motivation with sit to stand on transfers and ambulation.    -as per neuro MD, also to " continue aspirin 81mg daily for stroke prevention, although patient has not been on it while here.    13. PE h/o  14. LE edema  -s/p eliquis 5mg BID for about 1 year  - has intermittently had increased leg swelling, left greater than right.  -2 dopplers, including 2 weeks ago, negative since stopping AC  -will continue off AC for now      Code status  [place] code status: DNRCCA    Mari Castillo MD  Geriatric Fellow     Attending Addendum:  Chart reviewed, patient examined, labs/imaging reviewed, and key elements of the history and physical examination of the patient confirmed.  I reviewed the fellow's note and made edits where needed in italics. I agree with the documented findings and plan of care.    Linda Meek MD

## 2024-04-30 ENCOUNTER — NURSING HOME VISIT (OUTPATIENT)
Dept: POST ACUTE CARE | Facility: EXTERNAL LOCATION | Age: 79
End: 2024-04-30
Payer: MEDICARE

## 2024-04-30 DIAGNOSIS — E03.9 HYPOTHYROIDISM, UNSPECIFIED TYPE: ICD-10-CM

## 2024-04-30 DIAGNOSIS — G40.909 SEIZURE DISORDER (MULTI): Primary | ICD-10-CM

## 2024-04-30 DIAGNOSIS — R53.81 PHYSICAL DECONDITIONING: ICD-10-CM

## 2024-04-30 DIAGNOSIS — R26.9 GAIT DISORDER: ICD-10-CM

## 2024-04-30 DIAGNOSIS — F32.A ANXIETY AND DEPRESSION: ICD-10-CM

## 2024-04-30 DIAGNOSIS — M15.9 PRIMARY OSTEOARTHRITIS INVOLVING MULTIPLE JOINTS: ICD-10-CM

## 2024-04-30 DIAGNOSIS — F41.9 ANXIETY AND DEPRESSION: ICD-10-CM

## 2024-04-30 DIAGNOSIS — M48.061 SPINAL STENOSIS OF LUMBAR REGION, UNSPECIFIED WHETHER NEUROGENIC CLAUDICATION PRESENT: ICD-10-CM

## 2024-04-30 PROCEDURE — 99308 SBSQ NF CARE LOW MDM 20: CPT | Performed by: NURSE PRACTITIONER

## 2024-04-30 ASSESSMENT — ENCOUNTER SYMPTOMS
ARTHRALGIAS: 1
SHORTNESS OF BREATH: 0
DIFFICULTY URINATING: 0
CONSTIPATION: 0
APPETITE CHANGE: 0
PALPITATIONS: 0
DIZZINESS: 0
SLEEP DISTURBANCE: 0

## 2024-04-30 NOTE — PROGRESS NOTES
Reason for visit: fu seizure do, gait abnormal, anxiety    Subjective   HPI:79 Yo F with PMH of hypothyroidism- s/p thyroidectomy 2/2 thyroid cancer, peritonitis s/p peritoneal drain cx of fluid grew klebsiella , pseudomonas ornithinolytica and raoultella ; urine cx with morganella morganii, T2DM, seizure disorder, C3 T1 fusion depression, vit d insuff, . [facility] after peritonitis course. She was unable to fully gage function walking, and therefore was transitioned to LTC.     Seen today in room, lying in bed, No new complaints.  Patient says that she is getting in and out of bed with the standing left now.  Nursing notes reviewed, no current issues x dietician notes wt gain.     Review of Systems   Constitutional:  Negative for appetite change.   Respiratory:  Negative for shortness of breath.    Cardiovascular:  Negative for chest pain and palpitations.   Gastrointestinal:  Negative for constipation.   Genitourinary:  Negative for difficulty urinating.   Musculoskeletal:  Positive for arthralgias and gait problem.   Neurological:  Negative for dizziness.   Psychiatric/Behavioral:  Negative for sleep disturbance.      Objective   VS: reviewed in point click care BP: 160/64 mmHg  3/21/2024 09:59  Temp:97.8 °F  3/21/2024 09:59  Pulse:62 bpm  3/21/2024 09:59    Weight:196.6 Lbs  4/15/2024 14:35  Resp:18 Breaths/min  3/21/2024 09:59  BS:198 mg/dL  7/30/2022 14:43  O2:96 %  3/21/2024 09:59  Pain:0  3/21/2024 09:59    Physical Exam  Vitals reviewed.   Constitutional:       General: She is not in acute distress.  HENT:      Head: Normocephalic and atraumatic.      Nose: Nose normal.      Mouth/Throat:      Mouth: Mucous membranes are moist.      Pharynx: Oropharynx is clear.   Eyes:      Conjunctiva/sclera: Conjunctivae normal.   Pulmonary:      Effort: Pulmonary effort is normal.   Abdominal:      General: Abdomen is flat.   Musculoskeletal:         General: Normal range of motion.       Cervical back: Normal range of motion and neck supple.   Skin:     General: Skin is warm and dry.      Capillary Refill: Capillary refill takes less than 2 seconds.   Neurological:      Mental Status: She is alert. Mental status is at baseline.   Psychiatric:         Mood and Affect: Mood normal.     Lab work [id number] bmp, cbc abnormals only: glucose 129, RBC 3.77 H&H 11.9/34.  T4 TSH within normal limits folate within normal limits A1c 6.8 B12 and D both within normal limit    Assessment/Plan   1. Seizure disorder (Multi)  - no active sz at this time.   - cont meds     2. Anxiety and depression  - stable     3. Physical deconditioning  4. Spinal stenosis of lumbar region, unspecified whether neurogenic claudication present  5. Gait disorder  6. Primary osteoarthritis involving multiple joints  - intermitt injects for OA in knee  - uses standing lift for transfers, wc mobility     7. Hypothyroidism, unspecified type  - last labs wnl      Med review  - reviewed in [facility]    Code status  [place] code status: DNCA    Dispo: LTC, no new concerns at this time, fu 1 mo    TRIPP Jama-CNP  04/30/24

## 2024-04-30 NOTE — LETTER
Patient: Shikha Soliz  : 1945    Encounter Date: 2024    Reason for visit: fu seizure do, gait abnormal, anxiety    Subjective  HPI:77 Yo F with PMH of hypothyroidism- s/p thyroidectomy 2/2 thyroid cancer, peritonitis s/p peritoneal drain cx of fluid grew klebsiella , pseudomonas ornithinolytica and raoultella ; urine cx with morganella morganii, T2DM, seizure disorder, C3 T1 fusion depression, vit d insuff, . [facility] after peritonitis course. She was unable to fully gage function walking, and therefore was transitioned to LTC.     Seen today in room, lying in bed, No new complaints.  Patient says that she is getting in and out of bed with the standing left now.  Nursing notes reviewed, no current issues x dietician notes wt gain.     Review of Systems   Constitutional:  Negative for appetite change.   Respiratory:  Negative for shortness of breath.    Cardiovascular:  Negative for chest pain and palpitations.   Gastrointestinal:  Negative for constipation.   Genitourinary:  Negative for difficulty urinating.   Musculoskeletal:  Positive for arthralgias and gait problem.   Neurological:  Negative for dizziness.   Psychiatric/Behavioral:  Negative for sleep disturbance.      Objective  VS: reviewed in point click care BP: 160/64 mmHg  3/21/2024 09:59  Temp:97.8 °F  3/21/2024 09:59  Pulse:62 bpm  3/21/2024 09:59    Weight:196.6 Lbs  4/15/2024 14:35  Resp:18 Breaths/min  3/21/2024 09:59  BS:198 mg/dL  2022 14:43  O2:96 %  3/21/2024 09:59  Pain:0  3/21/2024 09:59    Physical Exam  Vitals reviewed.   Constitutional:       General: She is not in acute distress.  HENT:      Head: Normocephalic and atraumatic.      Nose: Nose normal.      Mouth/Throat:      Mouth: Mucous membranes are moist.      Pharynx: Oropharynx is clear.   Eyes:      Conjunctiva/sclera: Conjunctivae normal.   Pulmonary:      Effort: Pulmonary effort is normal.   Abdominal:      General: Abdomen is flat.    Musculoskeletal:         General: Normal range of motion.      Cervical back: Normal range of motion and neck supple.   Skin:     General: Skin is warm and dry.      Capillary Refill: Capillary refill takes less than 2 seconds.   Neurological:      Mental Status: She is alert. Mental status is at baseline.   Psychiatric:         Mood and Affect: Mood normal.     Lab work [id number] bmp, cbc abnormals only: glucose 129, RBC 3.77 H&H 11.9/34.  T4 TSH within normal limits folate within normal limits A1c 6.8 B12 and D both within normal limit    Assessment/Plan  1. Seizure disorder (Multi)  - no active sz at this time.   - cont meds     2. Anxiety and depression  - stable     3. Physical deconditioning  4. Spinal stenosis of lumbar region, unspecified whether neurogenic claudication present  5. Gait disorder  6. Primary osteoarthritis involving multiple joints  - intermitt injects for OA in knee  - uses standing lift for transfers, wc mobility     7. Hypothyroidism, unspecified type  - last labs wnl      Med review  - reviewed in [facility]    Code status  [place] code status: DNCA    Dispo: LTC, no new concerns at this time, fu 1 mo    SEVEN Jama  04/30/24      Electronically Signed By: SEVEN Jama   4/30/24  1:53 PM

## 2024-05-23 ENCOUNTER — NURSING HOME VISIT (OUTPATIENT)
Dept: POST ACUTE CARE | Facility: EXTERNAL LOCATION | Age: 79
End: 2024-05-23
Payer: MEDICARE

## 2024-05-23 DIAGNOSIS — E03.9 HYPOTHYROIDISM, UNSPECIFIED TYPE: ICD-10-CM

## 2024-05-23 DIAGNOSIS — M48.061 SPINAL STENOSIS OF LUMBAR REGION, UNSPECIFIED WHETHER NEUROGENIC CLAUDICATION PRESENT: ICD-10-CM

## 2024-05-23 DIAGNOSIS — R60.0 BILATERAL LEG EDEMA: ICD-10-CM

## 2024-05-23 DIAGNOSIS — F32.A ANXIETY AND DEPRESSION: ICD-10-CM

## 2024-05-23 DIAGNOSIS — R26.9 GAIT DISORDER: ICD-10-CM

## 2024-05-23 DIAGNOSIS — G40.909 SEIZURE DISORDER (MULTI): ICD-10-CM

## 2024-05-23 DIAGNOSIS — F41.9 ANXIETY AND DEPRESSION: ICD-10-CM

## 2024-05-23 DIAGNOSIS — M15.9 PRIMARY OSTEOARTHRITIS INVOLVING MULTIPLE JOINTS: ICD-10-CM

## 2024-05-23 DIAGNOSIS — R00.1 BRADYCARDIA: Primary | ICD-10-CM

## 2024-05-23 PROCEDURE — 99309 SBSQ NF CARE MODERATE MDM 30: CPT | Performed by: NURSE PRACTITIONER

## 2024-05-23 RX ORDER — HYDROCHLOROTHIAZIDE 12.5 MG/1
12.5 TABLET ORAL DAILY
Start: 2024-05-23 | End: 2024-06-22

## 2024-05-23 RX ORDER — UBIDECARENONE 75 MG
500 CAPSULE ORAL DAILY
COMMUNITY

## 2024-05-23 ASSESSMENT — ENCOUNTER SYMPTOMS
SHORTNESS OF BREATH: 0
LIGHT-HEADEDNESS: 0
DIZZINESS: 0
WEAKNESS: 0
DIFFICULTY URINATING: 0
ABDOMINAL PAIN: 0
ACTIVITY CHANGE: 0
APPETITE CHANGE: 0
ARTHRALGIAS: 1
PALPITATIONS: 0
SLEEP DISTURBANCE: 0

## 2024-05-23 NOTE — LETTER
Patient: Shikha Soliz  : 1945    Encounter Date: 2024    Reason for visit: Monthly follow-up, edema, bradycardia    Subjective  HPI:77 Yo F with PMH of hypothyroidism- s/p thyroidectomy 2/2 thyroid cancer, peritonitis s/p peritoneal drain cx of fluid grew klebsiella , pseudomonas ornithinolytica and raoultella ; urine cx with morganella morganii, T2DM, seizure disorder, C3 T1 fusion depression, vit d insuff, . [facility] after peritonitis course. She was unable to fully gage function walking, and therefore was transitioned to LTC.    Patient seen in room today lying in bed at time of assessment with fully dressed.  She states that her legs are swollen left greater than right, this has been a chronic condition and has had ultrasound in the past to rule out blood clots.   She does not use extra salt.  Heart rate 48, denies any heart palpitations, chest pain, shortness of breath, lightheadedness.  Discussed plan with patient (see below for plan) and she is agreeable.      Review of Systems   Constitutional:  Negative for activity change and appetite change.   Respiratory:  Negative for shortness of breath.    Cardiovascular:  Positive for leg swelling. Negative for chest pain and palpitations.   Gastrointestinal:  Negative for abdominal pain.   Genitourinary:  Negative for difficulty urinating.   Musculoskeletal:  Positive for arthralgias and gait problem.   Neurological:  Negative for dizziness, weakness and light-headedness.   Psychiatric/Behavioral:  Negative for sleep disturbance.        Objective  VS: reviewed in point click care BP: 160/64 mmHg  3/21/2024 09:59  Temp:97.8 °F  3/21/2024 09:59  Pulse:62 bpm  3/21/2024 09:59    Weight:196.6 Lbs  4/15/2024 14:35  Resp:18 Breaths/min  3/21/2024 09:59  BS:198 mg/dL  2022 14:43  O2:96 %  3/21/2024 09:59  Pain:0  3/21/2024 09:59      Physical Exam  Vitals reviewed.   Constitutional:       General: She is not in acute  distress.  HENT:      Head: Normocephalic and atraumatic.      Nose: Nose normal.      Mouth/Throat:      Mouth: Mucous membranes are moist.      Pharynx: Oropharynx is clear.   Cardiovascular:      Rate and Rhythm: Regular rhythm. Bradycardia present.      Pulses: Normal pulses.      Heart sounds: Normal heart sounds.   Pulmonary:      Effort: Pulmonary effort is normal.      Breath sounds: Normal breath sounds.   Abdominal:      General: Abdomen is flat. Bowel sounds are normal.      Palpations: Abdomen is soft.   Musculoskeletal:         General: Normal range of motion.      Cervical back: Normal range of motion and neck supple.   Skin:     General: Skin is warm and dry.      Capillary Refill: Capillary refill takes less than 2 seconds.   Neurological:      General: No focal deficit present.      Mental Status: She is alert. Mental status is at baseline.   Psychiatric:         Mood and Affect: Mood normal.       last tsh winl 03/2024       Assessment/Plan  1. Bradycardia  - last tsh is wnl  - on no BB or other meds to cause марина  - will check EKG, labs  - asymptomatic    2. Bilateral leg edema  - will reorder compression   - start hydrochlorothiazide 12.5 mg daily to help w edema and lower bp     3. Seizure disorder (Multi)  - no recent seizures, continue meds     4. Anxiety and depression  - stable     5. Spinal stenosis of lumbar region, unspecified whether neurogenic claudication present  6. Gait disorder  7. Primary osteoarthritis involving multiple joints  - gets L knee injections  - juhi lift/standing lift     8. Hypothyroidism, unspecified type  - last check wnl     9. Elevated bp   - will start hydrochlorothiazide     Med review  - reviewed in [facility] and epic      Code status  [place] code status: DNRCCA    Dispo: Bradycardia asympt, bp slightly elevated and LE edema, start hydrochlorothiazide, will check labs and EKG.     SEVEN Jama  05/23/24      Electronically Signed By: TRIPP Jama-CNP    5/23/24  3:04 PM

## 2024-05-23 NOTE — PROGRESS NOTES
Reason for visit: Monthly follow-up, edema, bradycardia    Subjective   HPI:77 Yo F with PMH of hypothyroidism- s/p thyroidectomy 2/2 thyroid cancer, peritonitis s/p peritoneal drain cx of fluid grew klebsiella , pseudomonas ornithinolytica and raoultella ; urine cx with morganella morganii, T2DM, seizure disorder, C3 T1 fusion depression, vit d insuff, . Presented to [facility]/12/20[facility] after peritonitis course. She was unable to fully gage function walking, and therefore was transitioned to LTC.    Patient seen in room today lying in bed at time of assessment with fully dressed.  She states that her legs are swollen left greater than right, this has been a chronic condition and has had ultrasound in the past to rule out blood clots.   She does not use extra salt.  Heart rate 48, denies any heart palpitations, chest pain, shortness of breath, lightheadedness.  Discussed plan with patient (see below for plan) and she is agreeable.      Review of Systems   Constitutional:  Negative for activity change and appetite change.   Respiratory:  Negative for shortness of breath.    Cardiovascular:  Positive for leg swelling. Negative for chest pain and palpitations.   Gastrointestinal:  Negative for abdominal pain.   Genitourinary:  Negative for difficulty urinating.   Musculoskeletal:  Positive for arthralgias and gait problem.   Neurological:  Negative for dizziness, weakness and light-headedness.   Psychiatric/Behavioral:  Negative for sleep disturbance.        Objective   VS: reviewed in point click care BP: 160/64 mmHg  3/21/2024 09:59  Temp:97.8 °F  3/21/2024 09:59  Pulse:62 bpm  3/21/2024 09:59    Weight:196.6 Lbs  4/15/2024 14:35  Resp:18 Breaths/min  3/21/2024 09:59  BS:198 mg/dL  7/30/2022 14:43  O2:96 %  3/21/2024 09:59  Pain:0  3/21/2024 09:59      Physical Exam  Vitals reviewed.   Constitutional:       General: She is not in acute distress.  HENT:      Head: Normocephalic and atraumatic.      Nose: Nose  normal.      Mouth/Throat:      Mouth: Mucous membranes are moist.      Pharynx: Oropharynx is clear.   Cardiovascular:      Rate and Rhythm: Regular rhythm. Bradycardia present.      Pulses: Normal pulses.      Heart sounds: Normal heart sounds.   Pulmonary:      Effort: Pulmonary effort is normal.      Breath sounds: Normal breath sounds.   Abdominal:      General: Abdomen is flat. Bowel sounds are normal.      Palpations: Abdomen is soft.   Musculoskeletal:         General: Normal range of motion.      Cervical back: Normal range of motion and neck supple.   Skin:     General: Skin is warm and dry.      Capillary Refill: Capillary refill takes less than 2 seconds.   Neurological:      General: No focal deficit present.      Mental Status: She is alert. Mental status is at baseline.   Psychiatric:         Mood and Affect: Mood normal.       last tsh winl 03/2024       Assessment/Plan   1. Bradycardia  - last tsh is wnl  - on no BB or other meds to cause марина  - will check EKG, labs  - asymptomatic    2. Bilateral leg edema  - will reorder compression   - start hydrochlorothiazide 12.5 mg daily to help w edema and lower bp     3. Seizure disorder (Multi)  - no recent seizures, continue meds     4. Anxiety and depression  - stable     5. Spinal stenosis of lumbar region, unspecified whether neurogenic claudication present  6. Gait disorder  7. Primary osteoarthritis involving multiple joints  - gets L knee injections  - juhi lift/standing lift     8. Hypothyroidism, unspecified type  - last check wnl     9. Elevated bp   - will start hydrochlorothiazide     Med review  - reviewed in [facility] and epic      Code status  [place] code status: DNRCCA    Dispo: Bradycardia asympt, bp slightly elevated and LE edema, start hydrochlorothiazide, will check labs and EKG.     TRIPP Jama-CNP  05/23/24

## 2024-06-04 ENCOUNTER — TELEPHONE (OUTPATIENT)
Dept: ORTHOPEDIC SURGERY | Facility: HOSPITAL | Age: 79
End: 2024-06-04
Payer: MEDICARE

## 2024-06-04 NOTE — TELEPHONE ENCOUNTER
Tried to call patient phone is not in service.  Spoke with Dina in ref to yadira Lee for her appointment on 6/10.  Yadira will be sent to [facility] pharmacy on 6/6.

## 2024-06-10 ENCOUNTER — OFFICE VISIT (OUTPATIENT)
Dept: ORTHOPEDIC SURGERY | Facility: HOSPITAL | Age: 79
End: 2024-06-10
Payer: MEDICARE

## 2024-06-10 DIAGNOSIS — M17.0 PRIMARY OSTEOARTHRITIS OF BOTH KNEES: Primary | ICD-10-CM

## 2024-06-10 PROCEDURE — 99214 OFFICE O/P EST MOD 30 MIN: CPT | Performed by: PHYSICIAN ASSISTANT

## 2024-06-10 PROCEDURE — 2500000004 HC RX 250 GENERAL PHARMACY W/ HCPCS (ALT 636 FOR OP/ED): Mod: JZ | Performed by: PHYSICIAN ASSISTANT

## 2024-06-10 PROCEDURE — 1159F MED LIST DOCD IN RCRD: CPT | Performed by: PHYSICIAN ASSISTANT

## 2024-06-10 PROCEDURE — 1036F TOBACCO NON-USER: CPT | Performed by: PHYSICIAN ASSISTANT

## 2024-06-10 PROCEDURE — 20610 DRAIN/INJ JOINT/BURSA W/O US: CPT | Mod: 50 | Performed by: PHYSICIAN ASSISTANT

## 2024-06-10 RX ADMIN — Medication 60 MG: at 13:52

## 2024-06-10 ASSESSMENT — PAIN - FUNCTIONAL ASSESSMENT
PAIN_FUNCTIONAL_ASSESSMENT: 0-10
PAIN_FUNCTIONAL_ASSESSMENT: NO/DENIES PAIN

## 2024-06-10 ASSESSMENT — PAIN DESCRIPTION - DESCRIPTORS
DESCRIPTORS: DISCOMFORT
DESCRIPTORS: DISCOMFORT

## 2024-06-10 ASSESSMENT — PAIN SCALES - GENERAL: PAINLEVEL_OUTOF10: 0 - NO PAIN

## 2024-06-10 NOTE — PATIENT INSTRUCTIONS
Patient received Durolane gel injections bilateral knees today and should observe for signs of infection and/or adverse reactions (redness, significant increase in pain, fever, nausea or vomiting) after receiving the injections today. If you develop any of the above symptoms, please contact my office. Patient should see the maximum effect in approximately 6 weeks and can receive another Durolane injection no sooner than 6 months from today. Patient will continue with activities as tolerated. Mobilize to prevent stiffness.  Follow up in 6 months, no x-rays needed. All questions were answered. Her appointment is attached.

## 2024-06-25 ENCOUNTER — NURSING HOME VISIT (OUTPATIENT)
Dept: POST ACUTE CARE | Facility: EXTERNAL LOCATION | Age: 79
End: 2024-06-25
Payer: MEDICARE

## 2024-06-25 DIAGNOSIS — M15.9 PRIMARY OSTEOARTHRITIS INVOLVING MULTIPLE JOINTS: ICD-10-CM

## 2024-06-25 DIAGNOSIS — G40.909 SEIZURE DISORDER (MULTI): ICD-10-CM

## 2024-06-25 DIAGNOSIS — R00.1 BRADYCARDIA: Primary | ICD-10-CM

## 2024-06-25 DIAGNOSIS — F32.9 MAJOR DEPRESSIVE DISORDER, REMISSION STATUS UNSPECIFIED, UNSPECIFIED WHETHER RECURRENT: ICD-10-CM

## 2024-06-25 DIAGNOSIS — E03.9 HYPOTHYROIDISM, UNSPECIFIED TYPE: ICD-10-CM

## 2024-06-25 DIAGNOSIS — R60.0 BILATERAL LEG EDEMA: ICD-10-CM

## 2024-06-25 PROCEDURE — 99308 SBSQ NF CARE LOW MDM 20: CPT | Performed by: NURSE PRACTITIONER

## 2024-06-25 RX ORDER — LACOSAMIDE 100 MG/1
100 TABLET ORAL EVERY 12 HOURS
Qty: 60 TABLET | Refills: 5 | Status: SHIPPED | OUTPATIENT
Start: 2024-06-25 | End: 2025-06-25

## 2024-06-25 NOTE — LETTER
Patient: Shikha Soliz  : 1945    Encounter Date: 2024    Reason for visit: Monthly follow-up, edema,  Subjective  HPI:77 Yo F with PMH of hypothyroidism- s/p thyroidectomy 2/2 thyroid cancer, peritonitis s/p peritoneal drain cx of fluid grew klebsiella , pseudomonas ornithinolytica and raoultella ; urine cx with morganella morganii, T2DM, seizure disorder, C3 T1 fusion depression, vit d insuff, . [facility] after peritonitis course. She was unable to fully gage function walking, and therefore was transitioned to LTC.    Patient seen in room today lying in bed, no new complaints, Knee pain from OA persists, pt gets injections at ortho. LE edema continues, teds not yet on for the day. Nursing notes reviewed: few days of low apical pulses, judy 35. Not on any beta blockers. Asymptomatic.     Review of Systems   Constitutional:  Negative for activity change and appetite change.   Respiratory:  Negative for shortness of breath.    Cardiovascular:  Positive for leg swelling. Negative for chest pain and palpitations.   Gastrointestinal:  Negative for abdominal pain.   Genitourinary:  Negative for difficulty urinating.   Musculoskeletal:  Positive for arthralgias and gait problem.   Neurological:  Negative for dizziness, weakness and light-headedness.   Psychiatric/Behavioral:  Negative for sleep disturbance.        Objective    Physical Exam  Vitals reviewed.   Constitutional:       General: She is not in acute distress.  HENT:      Head: Normocephalic and atraumatic.      Nose: Nose normal.      Mouth/Throat:      Mouth: Mucous membranes are moist.      Pharynx: Oropharynx is clear.   Cardiovascular:      Rate and Rhythm: Regular rhythm. Bradycardia present.      Pulses: Normal pulses.      Heart sounds: Normal heart sounds.   Pulmonary:      Effort: Pulmonary effort is normal.      Breath sounds: Normal breath sounds.   Abdominal:      General: Abdomen is flat. Bowel sounds are  normal.      Palpations: Abdomen is soft.   Musculoskeletal:         General: Normal range of motion.      Cervical back: Normal range of motion and neck supple.      Right lower leg: Edema present.      Left lower leg: Edema present.   Skin:     General: Skin is warm and dry.      Capillary Refill: Capillary refill takes less than 2 seconds.   Neurological:      General: No focal deficit present.      Mental Status: She is alert. Mental status is at baseline.   Psychiatric:         Mood and Affect: Mood normal.       last tsh winl 03/2024       Assessment/Plan  1. Bradycardia  - last tsh is wnl  - on no BB or other meds to cause марина  - asymptomatic    2. Bilateral leg edema  - compression   - start hydrochlorothiazide 12.5 mg daily to help w edema and lower bp     3. Seizure disorder (Multi)  - no recent seizures, continue meds     4. Anxiety and depression  - stable     5. Spinal stenosis of lumbar region, unspecified whether neurogenic claudication present  6. Gait disorder  7. Primary osteoarthritis involving multiple joints  - gets L knee injections, last 061024  - juhi lift/standing lift     8. Hypothyroidism, unspecified type  - last check wnl     9. Elevated bp   -  hydrochlorothiazide has improved     Med review  - reviewed     Code status  [place] code status: DNRCCA    Dispo: BP better, some bradycardia but not symptomatic, continue to monitor.     SEVEN Jama  07/01/24      Electronically Signed By: SEVEN Jama   6/26/24  5:26 PM

## 2024-06-26 NOTE — PROGRESS NOTES
Reason for visit: Monthly follow-up, edema,  Subjective   HPI:79 Yo F with PMH of hypothyroidism- s/p thyroidectomy 2/2 thyroid cancer, peritonitis s/p peritoneal drain cx of fluid grew klebsiella , pseudomonas ornithinolytica and raoultella ; urine cx with morganella morganii, T2DM, seizure disorder, C3 T1 fusion depression, vit d insuff, . Presented [facility] 8/12/2022 after peritonitis course. She was unable to fully gage function walking, and therefore was transitioned to LTC.    Patient seen in room today lying in bed, no new complaints, Knee pain from OA persists, pt gets injections at ortho. LE edema continues, teds not yet on for the day. Nursing notes reviewed: few days of low apical pulses, judy 35. Not on any beta blockers. Asymptomatic.     Review of Systems   Constitutional:  Negative for activity change and appetite change.   Respiratory:  Negative for shortness of breath.    Cardiovascular:  Positive for leg swelling. Negative for chest pain and palpitations.   Gastrointestinal:  Negative for abdominal pain.   Genitourinary:  Negative for difficulty urinating.   Musculoskeletal:  Positive for arthralgias and gait problem.   Neurological:  Negative for dizziness, weakness and light-headedness.   Psychiatric/Behavioral:  Negative for sleep disturbance.        Objective     Physical Exam  Vitals reviewed.   Constitutional:       General: She is not in acute distress.  HENT:      Head: Normocephalic and atraumatic.      Nose: Nose normal.      Mouth/Throat:      Mouth: Mucous membranes are moist.      Pharynx: Oropharynx is clear.   Cardiovascular:      Rate and Rhythm: Regular rhythm. Bradycardia present.      Pulses: Normal pulses.      Heart sounds: Normal heart sounds.   Pulmonary:      Effort: Pulmonary effort is normal.      Breath sounds: Normal breath sounds.   Abdominal:      General: Abdomen is flat. Bowel sounds are normal.      Palpations: Abdomen is soft.   Musculoskeletal:          General: Normal range of motion.      Cervical back: Normal range of motion and neck supple.      Right lower leg: Edema present.      Left lower leg: Edema present.   Skin:     General: Skin is warm and dry.      Capillary Refill: Capillary refill takes less than 2 seconds.   Neurological:      General: No focal deficit present.      Mental Status: She is alert. Mental status is at baseline.   Psychiatric:         Mood and Affect: Mood normal.       last tsh winl 03/2024       Assessment/Plan   1. Bradycardia  - last tsh is wnl  - on no BB or other meds to cause марина  - asymptomatic    2. Bilateral leg edema  - compression   - start hydrochlorothiazide 12.5 mg daily to help w edema and lower bp     3. Seizure disorder (Multi)  - no recent seizures, continue meds     4. Anxiety and depression  - stable     5. Spinal stenosis of lumbar region, unspecified whether neurogenic claudication present  6. Gait disorder  7. Primary osteoarthritis involving multiple joints  - gets L knee injections, last 061024  - juhi lift/standing lift     8. Hypothyroidism, unspecified type  - last check wnl     9. Elevated bp   -  hydrochlorothiazide has improved     Med review  - reviewed     Code status  [place] code status: DNRCCA    Dispo: BP better, some bradycardia but not symptomatic, continue to monitor.     Monica Espinosa, TRIPP-CNP  07/01/24

## 2024-07-01 ASSESSMENT — ENCOUNTER SYMPTOMS
ARTHRALGIAS: 1
WEAKNESS: 0
LIGHT-HEADEDNESS: 0
PALPITATIONS: 0
ACTIVITY CHANGE: 0
DIZZINESS: 0
SHORTNESS OF BREATH: 0
DIFFICULTY URINATING: 0
ABDOMINAL PAIN: 0
APPETITE CHANGE: 0
SLEEP DISTURBANCE: 0

## 2024-07-04 DIAGNOSIS — R00.1 BRADYCARDIA: Primary | ICD-10-CM

## 2024-07-04 NOTE — PROGRESS NOTES
Patient having intermittent sinus bradycardia to 30s. EKG done today and showed sinus марина with HR 36.   Asymptomtic. Will order holter monitor

## 2024-09-12 ENCOUNTER — APPOINTMENT (OUTPATIENT)
Dept: NEUROLOGY | Facility: CLINIC | Age: 79
End: 2024-09-12
Payer: MEDICARE

## 2024-09-24 ENCOUNTER — NURSING HOME VISIT (OUTPATIENT)
Dept: POST ACUTE CARE | Facility: EXTERNAL LOCATION | Age: 79
End: 2024-09-24
Payer: MEDICARE

## 2024-09-24 DIAGNOSIS — G40.909 SEIZURE DISORDER (MULTI): ICD-10-CM

## 2024-09-24 DIAGNOSIS — M15.9 PRIMARY OSTEOARTHRITIS INVOLVING MULTIPLE JOINTS: ICD-10-CM

## 2024-09-24 DIAGNOSIS — R29.898 WEAKNESS OF BOTH LOWER EXTREMITIES: ICD-10-CM

## 2024-09-24 DIAGNOSIS — R00.1 BRADYCARDIA: Primary | ICD-10-CM

## 2024-09-24 DIAGNOSIS — E03.9 HYPOTHYROIDISM, UNSPECIFIED TYPE: ICD-10-CM

## 2024-09-24 DIAGNOSIS — I10 PRIMARY HYPERTENSION: ICD-10-CM

## 2024-09-24 PROCEDURE — 99309 SBSQ NF CARE MODERATE MDM 30: CPT | Performed by: NURSE PRACTITIONER

## 2024-09-24 ASSESSMENT — ENCOUNTER SYMPTOMS
LIGHT-HEADEDNESS: 0
DIZZINESS: 0
ARTHRALGIAS: 1
ACTIVITY CHANGE: 0
APPETITE CHANGE: 0
ABDOMINAL PAIN: 0
DIFFICULTY URINATING: 0
SHORTNESS OF BREATH: 0
SLEEP DISTURBANCE: 0
WEAKNESS: 0
PALPITATIONS: 0

## 2024-09-24 NOTE — PROGRESS NOTES
Reason for visit: OA, knee pain, deconditioning   Subjective   HPI:77 Yo F with PMH of hypothyroidism- s/p thyroidectomy 2/2 thyroid cancer, peritonitis s/p peritoneal drain cx of fluid grew klebsiella , pseudomonas ornithinolytica and raoultella ; urine cx with morganella morganii, T2DM, seizure disorder, C3 T1 fusion depression, vit d insuff, . Presented [facility]/12/2022 after peritonitis course. She was unable to fully gage function walking, and therefore was transitioned to LTC.    Patient seen in room today sitting up in chair no new complaints, Knee pain from OA persists, pt gets injections at ortho, due soon. No new concerns, says her hus is improving post hip surgery at home. No significant progress note entries.       Review of Systems   Constitutional:  Negative for activity change and appetite change.   Respiratory:  Negative for shortness of breath.    Cardiovascular:  Negative for chest pain, palpitations and leg swelling.   Gastrointestinal:  Negative for abdominal pain.   Genitourinary:  Negative for difficulty urinating.   Musculoskeletal:  Positive for arthralgias and gait problem.   Neurological:  Negative for dizziness, weakness and light-headedness.   Psychiatric/Behavioral:  Negative for sleep disturbance.        Objective   BP: 163/62 mmHg  9/24/2024 09:34    Temp:97.5 °F  9/24/2024 09:34    Pulse:43 bpm  9/24/2024 09:34  Weight:198 Lbs  6/1/2024 17:39  Resp:18 Breaths/min  9/24/2024 09:34  BS:198 mg/dL  7/30/2022 14:43  O2:98 %  9/24/2024 09:34  Pain:0  9/2/2024 00:47  Physical Exam  Vitals reviewed.   Constitutional:       General: She is not in acute distress.  HENT:      Head: Normocephalic and atraumatic.      Nose: Nose normal.      Mouth/Throat:      Mouth: Mucous membranes are moist.      Pharynx: Oropharynx is clear.   Pulmonary:      Effort: Pulmonary effort is normal.   Abdominal:      General: Abdomen is flat. Bowel sounds are normal.      Palpations: Abdomen is soft.    Musculoskeletal:         General: Normal range of motion.      Cervical back: Normal range of motion and neck supple.      Right lower leg: No edema.      Left lower leg: No edema.   Skin:     General: Skin is warm and dry.      Capillary Refill: Capillary refill takes less than 2 seconds.   Neurological:      General: No focal deficit present.      Mental Status: She is alert. Mental status is at baseline.   Psychiatric:         Mood and Affect: Mood normal.       last tsh winl 03/2024   [id number] hepatic panel and cbc unremarkable     Assessment/Plan   1. Bradycardia 40s-50s  - last tsh is wnl  - on no BB or other meds to cause марина  - asymptomatic  - holter orders in system from 070424 but does not look completed, will ask staff to check into this; EKG done in July reviewed and only says sinus марина.     2. Bilateral leg edema  - compression   - start hydrochlorothiazide 12.5 mg daily to help w edema and lower bp   - stable today     3. Seizure disorder (Multi)  - no recent seizures, continue meds     4. Anxiety and depression  - stable     5. Spinal stenosis of lumbar region, unspecified whether neurogenic claudication present  6. Gait disorder  7. Primary osteoarthritis involving multiple joints  - gets L knee injections, last 061024  - juhi lift/standing lift     8. Hypothyroidism, unspecified type  - last check wnl     9. Elevated bp  - sys still higher than desired, on hydrochlorothiazide 12.5 mg, will add a low dose ACE.     Med review  - reviewed     Code status  [place] code status: DNRCCA    Dispo: sys bps still higher than desired, will add lisinopril 2.5 mg and check status of holter.     TRIPP Jama-CNP  09/24/24

## 2024-09-24 NOTE — LETTER
Patient: Shikha Soliz  : 1945    Encounter Date: 2024    Reason for visit: OA, knee pain, deconditioning   Subjective  HPI:79 Yo F with PMH of hypothyroidism- s/p thyroidectomy 2/2 thyroid cancer, peritonitis s/p peritoneal drain cx of fluid grew klebsiella , pseudomonas ornithinolytica and raoultella ; urine cx with morganella morganii, T2DM, seizure disorder, C3 T1 fusion depression, vit d insuff, . [facility] after peritonitis course. She was unable to fully gage function walking, and therefore was transitioned to LTC.    Patient seen in room today sitting up in chair no new complaints, Knee pain from OA persists, pt gets injections at ortho, due soon. No new concerns, says her hus is improving post hip surgery at home. No significant progress note entries.       Review of Systems   Constitutional:  Negative for activity change and appetite change.   Respiratory:  Negative for shortness of breath.    Cardiovascular:  Negative for chest pain, palpitations and leg swelling.   Gastrointestinal:  Negative for abdominal pain.   Genitourinary:  Negative for difficulty urinating.   Musculoskeletal:  Positive for arthralgias and gait problem.   Neurological:  Negative for dizziness, weakness and light-headedness.   Psychiatric/Behavioral:  Negative for sleep disturbance.        Objective  BP: 163/62 mmHg  2024 09:34    Temp:97.5 °F  2024 09:34    Pulse:43 bpm  2024 09:34  Weight:198 Lbs  2024 17:39  Resp:18 Breaths/min  2024 09:34  BS:198 mg/dL  2022 14:43  O2:98 %  2024 09:34  Pain:0  2024 00:47  Physical Exam  Vitals reviewed.   Constitutional:       General: She is not in acute distress.  HENT:      Head: Normocephalic and atraumatic.      Nose: Nose normal.      Mouth/Throat:      Mouth: Mucous membranes are moist.      Pharynx: Oropharynx is clear.   Pulmonary:      Effort: Pulmonary effort is normal.   Abdominal:      General: Abdomen is  flat. Bowel sounds are normal.      Palpations: Abdomen is soft.   Musculoskeletal:         General: Normal range of motion.      Cervical back: Normal range of motion and neck supple.      Right lower leg: No edema.      Left lower leg: No edema.   Skin:     General: Skin is warm and dry.      Capillary Refill: Capillary refill takes less than 2 seconds.   Neurological:      General: No focal deficit present.      Mental Status: She is alert. Mental status is at baseline.   Psychiatric:         Mood and Affect: Mood normal.       last tsh winl 03/2024   [id number] hepatic panel and cbc unremarkable     Assessment/Plan  1. Bradycardia 40s-50s  - last tsh is wnl  - on no BB or other meds to cause марина  - asymptomatic  - holter orders in system from 070424 but does not look completed, will ask staff to check into this; EKG done in July reviewed and only says sinus марина.     2. Bilateral leg edema  - compression   - start hydrochlorothiazide 12.5 mg daily to help w edema and lower bp   - stable today     3. Seizure disorder (Multi)  - no recent seizures, continue meds     4. Anxiety and depression  - stable     5. Spinal stenosis of lumbar region, unspecified whether neurogenic claudication present  6. Gait disorder  7. Primary osteoarthritis involving multiple joints  - gets L knee injections, last 061024  - juhi lift/standing lift     8. Hypothyroidism, unspecified type  - last check wnl     9. Elevated bp  - sys still higher than desired, on hydrochlorothiazide 12.5 mg, will add a low dose ACE.     Med review  - reviewed     Code status  [place] code status: DNRCCA    Dispo: sys bps still higher than desired, will add lisinopril 2.5 mg and check status of holter.     SEVEN Jama  09/24/24      Electronically Signed By: SEVEN Jama   9/24/24  5:09 PM

## 2024-09-25 DIAGNOSIS — G40.909 SEIZURE DISORDER (MULTI): ICD-10-CM

## 2024-10-08 ENCOUNTER — APPOINTMENT (OUTPATIENT)
Dept: NEUROLOGY | Facility: CLINIC | Age: 79
End: 2024-10-08
Payer: MEDICARE

## 2024-10-08 VITALS
TEMPERATURE: 97.6 F | DIASTOLIC BLOOD PRESSURE: 90 MMHG | HEART RATE: 44 BPM | RESPIRATION RATE: 16 BRPM | SYSTOLIC BLOOD PRESSURE: 142 MMHG

## 2024-10-08 DIAGNOSIS — G40.909 SEIZURE DISORDER (MULTI): Primary | ICD-10-CM

## 2024-10-08 DIAGNOSIS — G93.89 CEREBRAL VENTRICULOMEGALY: ICD-10-CM

## 2024-10-08 DIAGNOSIS — E11.42 DIABETIC PERIPHERAL NEUROPATHY (MULTI): ICD-10-CM

## 2024-10-08 DIAGNOSIS — G45.9 TIA (TRANSIENT ISCHEMIC ATTACK): ICD-10-CM

## 2024-10-08 DIAGNOSIS — M47.12 MYELOPATHY, SPONDYLOGENIC, CERVICAL: ICD-10-CM

## 2024-10-08 DIAGNOSIS — R41.9 NEUROCOGNITIVE DISORDER: ICD-10-CM

## 2024-10-08 DIAGNOSIS — R26.9 GAIT DISORDER: ICD-10-CM

## 2024-10-08 DIAGNOSIS — G20.C PARKINSONISM, UNSPECIFIED PARKINSONISM TYPE (MULTI): ICD-10-CM

## 2024-10-08 PROCEDURE — 1160F RVW MEDS BY RX/DR IN RCRD: CPT | Performed by: PSYCHIATRY & NEUROLOGY

## 2024-10-08 PROCEDURE — 1159F MED LIST DOCD IN RCRD: CPT | Performed by: PSYCHIATRY & NEUROLOGY

## 2024-10-08 PROCEDURE — 99215 OFFICE O/P EST HI 40 MIN: CPT | Performed by: PSYCHIATRY & NEUROLOGY

## 2024-10-08 PROCEDURE — 1036F TOBACCO NON-USER: CPT | Performed by: PSYCHIATRY & NEUROLOGY

## 2024-10-08 PROCEDURE — G2211 COMPLEX E/M VISIT ADD ON: HCPCS | Performed by: PSYCHIATRY & NEUROLOGY

## 2024-10-08 RX ORDER — LACOSAMIDE 100 MG/1
100 TABLET ORAL EVERY 12 HOURS
Qty: 60 TABLET | Refills: 5 | Status: SHIPPED | OUTPATIENT
Start: 2024-10-08 | End: 2025-10-08

## 2024-10-08 ASSESSMENT — ENCOUNTER SYMPTOMS
OCCASIONAL FEELINGS OF UNSTEADINESS: 1
DEPRESSION: 0
WEAKNESS: 1
LOSS OF SENSATION IN FEET: 0

## 2024-10-08 NOTE — PATIENT INSTRUCTIONS
The patient is essentially stable from a neurological standpoint.  We had a long discussion regarding the need for neuropsych testing pre and post lumbar puncture.  The patient's  is very concerned that she already had surgery on her cervical spine without any significant improvement in her ambulation.  He is worried that even if we do the neuropsych testing pre and post lumbar puncture that since she is so debilitated and deconditioned that we might not see any improvement.  The patient and her  are going to discuss whether or not they wish to pursue neuropsych testing pre and post lumbar puncture.  The patient should continue to take her Keppra and Vimpat.  I did refill both her Keppra and Vimpat.  The patient needs Keppra and lacosamide levels.  The patient needs a CBC and liver function test done every 6 months while on these medicines.  The patient needs to get at least 8 hours sleep a night and avoid excessive alcohol intake.  The patient did not get the polyneuropathy panel and I stressed the need to get these done.  The patient certainly continue physical and occupational therapy.  The patient needs to stay well-hydrated.  The patient should continue aspirin and stroke risk factor modification.  I discussed all these issues in detail with the patient and her  and answered all their questions   The patient will follow up with me in 6 months.

## 2024-10-08 NOTE — PROGRESS NOTES
Subjective     Shikha Martínez 79 y.o.  HPI  The patient and her  both attend the appointment today.  The patient feels that her mental status has been stable.  She is still unable to ambulate and uses a wheelchair to get around.  The patient has had no further seizures and is compliant with her Keppra and lacosamide.  I did review her [facility] report.  The patient did not get her CBC, liver function test, Keppra level, lacosamide level or polyneuropathy panel done.        Review of Systems   Neurological:  Positive for weakness.        Difficulty with ambulation   All other systems reviewed and are negative.       Patient Active Problem List   Diagnosis    Hypothyroidism    Seizure disorder (Multi)    Hyperlipidemia    Gastroesophageal reflux disease without esophagitis    Physical deconditioning    Primary osteoarthritis involving multiple joints    History of abdominal abscess    ASCUS with positive high risk HPV cervical    Gait disorder    Acquired stenosis of right nasolacrimal duct    Adjustment disorder with depressed mood    Atrophy of vagina    Breast pain    Chronic dacryocystitis of right lacrimal passage    Chronic dacryocystitis of right lacrimal sac    Diabetic peripheral neuropathy (Multi)    Hyperglycemia    Cervical stenosis of spine    Lumbar stenosis    Myelopathy, spondylogenic, cervical    Mild vitamin D deficiency    Neurocognitive disorder    Papillary carcinoma of thyroid (Multi)    Follicular adenocarcinoma of thyroid gland (Multi)    Parkinsonism (Multi)    Peripheral neuropathy    Pes planus    Plantar fasciitis    Prediabetes    Primary osteoarthritis of both knees    S/P cervical spinal fusion    Temporal lobe epilepsy (Multi)    TIA (transient ischemic attack)    Upper airway cough syndrome    Weakness of both lower extremities    Anxiety and depression    Cerebral ventriculomegaly    Pressure ulcer of right heel, stage 3 (Multi)    Bradycardia    Bilateral leg edema        Past  Medical History:   Diagnosis Date    Abrasion, left lesser toe(s), initial encounter 08/03/2021    Abrasion of toe of left foot, initial encounter    Colon polyps 11/03/2023    Convulsions (Multi) 11/03/2023    Decreased white blood cell count, unspecified 05/05/2020    WBC decreased    Disorder of lipoprotein metabolism, 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    Cholesterol serum decreased    Encounter for gynecological examination (general) (routine) without abnormal findings 10/17/2016    Encounter for cervical Pap smear with pelvic exam    Encounter for screening for malignant neoplasm of cervix     Encounter for Papanicolaou smear for cervical cancer screening    Encounter for screening for malignant neoplasm of colon 06/23/2015    Encounter for screening colonoscopy    Erythema intertrigo 10/17/2016    Intertrigo    Glycosuria 11/03/2021    Sugar urinary present    Intertrigo 11/03/2023    Intra-abdominal abscess (Multi) 11/03/2023    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    Lichen sclerosus et atrophicus 11/03/2023    Major depressive disorder 10/26/2023    Malignant neoplasm of thyroid gland (Multi) 06/13/2013    Malignant neoplasm of thyroid gland    NPH (normal pressure hydrocephalus) (Multi) 11/03/2023    Osteoarthrosis, localized, primary, knee 11/03/2023    Other conditions influencing health status 07/15/2015    History of cough    Other disorders of pituitary gland     Empty sella syndrome    Other specified cough 03/16/2020    Upper airway cough syndrome    Other specified personal risk factors, not elsewhere classified 10/17/2016    Encounter for gynecologic examination for high-risk patient covered by Medicare    Other symptoms and signs involving the musculoskeletal system 10/12/2021    Weakness of both lower extremities    Pain in right hip 07/12/2021    Right hip pain    Pain in unspecified ankle and joints of unspecified foot 11/24/2014    Ankle pain    Pain in unspecified foot 11/24/2014    Heel pain    Pain in  unspecified foot 08/02/2021    Foot pain    Pain in unspecified knee 12/03/2020    Knee pain    Pain in unspecified knee 11/04/2021    Joint pain, knee    Pain, unspecified 04/12/2022    Acute pain    Personal history of other diseases of the female genital tract 02/21/2017    History of breast pain    Personal history of other diseases of the nervous system and sense organs 02/25/2022    History of peripheral neuropathy    Personal history of other diseases of the nervous system and sense organs 11/24/2014    History of conjunctivitis    Personal history of other endocrine, nutritional and metabolic disease 06/29/2018    History of hyperglycemia    Personal history of other endocrine, nutritional and metabolic disease 11/03/2021    History of hyperglycemia    Personal history of other endocrine, nutritional and metabolic disease     History of hyperlipidemia    Personal history of other medical treatment     History of screening mammography    Personal history of other medical treatment 02/05/2020    History of screening mammography    Personal history of other specified conditions 04/24/2018    History of urinary frequency    Personal history of other specified conditions 04/28/2020    History of convulsions    Personal history of other specified conditions 10/12/2021    History of gait disorder    Personal history of urinary (tract) infections 09/05/2019    History of urinary tract infection    Physical deconditioning 10/26/2023    Poor balance 11/03/2023    Prediabetes 09/05/2019    Prediabetes    Prediabetes 10/08/2021    Pre-diabetes    Pulmonary embolism 11/03/2023    Trochanteric bursitis of right hip 11/03/2023    Unspecified convulsions (Multi)     Seizures    Unspecified fracture of left toe(s), initial encounter for closed fracture 08/24/2021    Toe fracture, left    Unspecified injury of unspecified foot, initial encounter 04/12/2021    Toe injury        Past Surgical History:   Procedure Laterality  Date    KNEE SURGERY  08/23/2013    Knee Surgery Left    MR HEAD ANGIO WO IV CONTRAST  10/10/2020    MR HEAD ANGIO WO IV CONTRAST 10/10/2020 [facility]    MR NECK ANGIO WO IV CONTRAST  10/10/2020    MR NECK ANGIO WO IV CONTRAST 10/10/2020 [facility]    OTHER SURGICAL HISTORY  02/25/2022    Neck surgery    TONSILLECTOMY  08/23/2013    Tonsillectomy    TOTAL THYROIDECTOMY  10/21/2013    Thyroid Surgery Total Thyroidectomy    TUBAL LIGATION  08/23/2013    Tubal Ligation    US GUIDED PERCUTANEOUS PERITONEAL OR RETROPERITONEAL FLUID COLLECTION DRAINAGE  8/1/2022    US GUIDED PERCUTANEOUS PERITONEAL OR RETROPERITONEAL FLUID COLLECTION DRAINAGE 8/1/2022 [facility]        Social History     Socioeconomic History    Marital status:      Spouse name: Not on file    Number of children: Not on file    Years of education: Not on file    Highest education level: Not on file   Occupational History    Not on file   Tobacco Use    Smoking status: Never     Passive exposure: Never    Smokeless tobacco: Never   Substance and Sexual Activity    Alcohol use: Not Currently    Drug use: Never    Sexual activity: Not on file   Other Topics Concern    Not on file   Social History Narrative    Not on file     Social Determinants of Health     Financial Resource Strain: Not on file   Food Insecurity: Not on file   Transportation Needs: Not on file   Physical Activity: Not on file   Stress: Not on file   Social Connections: Not on file   Intimate Partner Violence: Not on file   Housing Stability: Not on file        Family History   Problem Relation Name Age of Onset    Memory loss Mother      Heart disease Father      Hyperlipidemia Father      Depression Daughter      Autism Son      Depression Son      Diabetes Maternal Grandmother          Current Outpatient Medications   Medication Instructions    acetaminophen (TYLENOL) 650 mg, oral, Every 4 hours PRN, DO NOT EXCEED 3000 MG IN 24 HOURS<BR>     carboxymethylcellulose (Refresh Plus) 0.5 % ophthalmic solution 1 drop, Both Eyes, 4 times daily    cholecalciferol (VITAMIN D-3) 3,000 Units, oral, Daily    cyanocobalamin (VITAMIN B-12) 500 mcg, oral, Daily    diclofenac sodium 1 % kit Every 6 hours,  apply 4 G to lower ext or 2G to upper ext up to 4x per day prn pain    escitalopram (Lexapro) 5 mg tablet 1 tablet, oral, Daily    eucerin cream Topical, 2 times daily, To affected areas     hydroCHLOROthiazide (MICROZIDE) 12.5 mg, oral, Daily    hydrocortisone 1 % cream Topical, 2 times daily PRN, To affected areas    lacosamide (VIMPAT) 100 mg, oral, Every 12 hours    levETIRAcetam (KEPPRA) 1,000 mg, oral, 2 times daily    levothyroxine (Synthroid) 88 mcg tablet 1 tablet, oral, Daily    Nystop 100,000 unit/gram powder Apply topically 2 times a day as needed. To affected areas    ondansetron (Zofran) 4 mg tablet Take 1 tablet (4 mg) by mouth every 6 hours if needed for nausea.    pantoprazole (ProtoNix) 40 mg EC tablet 1 tablet, oral, Daily    simvastatin (Zocor) 20 mg tablet 1 tablet, oral, Every evening        Allergies   Allergen Reactions    Ciprofloxacin Itching and Other     Eye inflammation    Used eye drops caused eye inflammation    eyedrop    Dilantin [Phenytoin Sodium Extended] Unknown    Codeine Dizziness, Nausea/vomiting, Other and Rash     Dyspepsia    Headaches    headache    Naproxen Hives and Rash    Phenytoin Rash        Objective  /90 (BP Location: Right arm)   Pulse (!) 44   Temp 36.4 °C (97.6 °F)   Resp 16    GENERAL APPEARANCE:  No distress, alert and cooperative.     MENTAL STATE:  Orientation was normal to time, place and person.  The patient has mild confusion. The patient is able to remember 3 out of 3 objects at 5 minutes.  Attention span and concentration were normal. Language testing was normal for comprehension, repetition, expression, and naming. Calculation was intact. The patient could correctly interpret a picture, and copy  a diagram. General fund of knowledge was intact.     CRANIAL NERVES:  Cranial nerves were normal.      CN 2- Visual Acuity  OD: 20/20 (corrected)   OS: 20/20 (corrected); visual fields full to confrontation.      CN 3, 4, 6-  Pupils round, 4 mm in diameter, equally reactive to light. No ptosis. EOMs normal alignment, full range of movement, no nystagmus     CN 5- Facial sensation intact bilaterally. Normal corneal reflexes.      CN 7- Normal and symmetric facial strength. Nasolabial folds symmetric.     CN 8- Hearing intact to finger rub, whisper.      CN 9- Palate elevates symmetrically. Normal gag reflex.      CN 11- Normal strength of shoulder shrug and neck turning      CN 12- Tongue midline, with normal bulk and strength; no fasciculations.     MOTOR:  Motor exam was normal. Muscle bulk and tone were normal in both upper and lower extremities.  The patient's motor strength in the upper extremities is 5-/5 and in the lower extremities is 4+ to 5-/5 proximally and 4+/5 distally.    GAIT: The patient's gait was not tested as she is currently in wheelchair.    Assessment/Plan   The patient is essentially stable from a neurological standpoint.  We had a long discussion regarding the need for neuropsych testing pre and post lumbar puncture.  The patient's  is very concerned that she already had surgery on her cervical spine without any significant improvement in her ambulation.  He is worried that even if we do the neuropsych testing pre and post lumbar puncture that since she is so debilitated and deconditioned that we might not see any improvement.  The patient and her  are going to discuss whether or not they wish to pursue neuropsych testing pre and post lumbar puncture.  The patient should continue to take her Keppra and Vimpat.  I did refill both her Keppra and Vimpat.  The patient needs Keppra and lacosamide levels.  The patient needs a CBC and liver function test done every 6 months while on  these medicines.  The patient needs to get at least 8 hours sleep a night and avoid excessive alcohol intake.  The patient did not get the polyneuropathy panel and I stressed the need to get these done.  The patient certainly continue physical and occupational therapy.  The patient needs to stay well-hydrated.  The patient should continue aspirin and stroke risk factor modification.  I discussed all these issues in detail with the patient and her  and answered all their questions   The patient will follow up with me in 6 months.

## 2024-12-03 ENCOUNTER — NURSING HOME VISIT (OUTPATIENT)
Dept: POST ACUTE CARE | Facility: EXTERNAL LOCATION | Age: 79
End: 2024-12-03
Payer: MEDICARE

## 2024-12-03 DIAGNOSIS — E53.8 VITAMIN B12 DEFICIENCY: ICD-10-CM

## 2024-12-03 DIAGNOSIS — I10 PRIMARY HYPERTENSION: ICD-10-CM

## 2024-12-03 DIAGNOSIS — E83.42 HYPOMAGNESEMIA: ICD-10-CM

## 2024-12-03 DIAGNOSIS — R00.1 BRADYCARDIA: ICD-10-CM

## 2024-12-03 DIAGNOSIS — G40.909 SEIZURE DISORDER (MULTI): Primary | ICD-10-CM

## 2024-12-03 DIAGNOSIS — R73.03 PREDIABETES: ICD-10-CM

## 2024-12-03 DIAGNOSIS — E03.9 HYPOTHYROIDISM, UNSPECIFIED TYPE: ICD-10-CM

## 2024-12-03 DIAGNOSIS — F03.90 DEMENTIA WITHOUT BEHAVIORAL DISTURBANCE, PSYCHOTIC DISTURBANCE, MOOD DISTURBANCE, OR ANXIETY, UNSPECIFIED DEMENTIA SEVERITY, UNSPECIFIED DEMENTIA TYPE: ICD-10-CM

## 2024-12-03 DIAGNOSIS — K21.9 GASTROESOPHAGEAL REFLUX DISEASE WITHOUT ESOPHAGITIS: ICD-10-CM

## 2024-12-03 DIAGNOSIS — E55.9 MILD VITAMIN D DEFICIENCY: ICD-10-CM

## 2024-12-03 PROCEDURE — 99309 SBSQ NF CARE MODERATE MDM 30: CPT | Performed by: INTERNAL MEDICINE

## 2024-12-03 NOTE — LETTER
Patient: Shikha Soliz  : 1945    Encounter Date: 2024      Division: Geriatrics  Date of Service: 2024  Visit Type: Long-Term Care Regulatory Follow-up Visit      Chief Complaint: Seizures; 60 day visit    Subjective  History obtained from  facility staff (nurse, aid, PT/OT/SLP, SW and/or DON), medical records, and Geriatrics care team. Collateral history obtained due to dementia    Seizures        Had an appt w neurology on 10/2024. Brought by . Stable. Will need q6months CBC and LFT and levels of Keppra and Vimpat.     No interval seizures.    Dementia - pt unable to recall the visit with neurology. Unable to recall her medical hx.     Chronic bradycardia - HR usually when awake is around 40s-50s. Holter monitor was ordered previously but unsure of the status.   1 day after the pt visit, on 2024 at 7AM, I got called for HR of 33, asymptomatic. Taken when pt just woke up.  Vital signs stable at /59, temperature 97.6, RR 18, O2 saturation 98% on room air.    Hospitalization/ER visits:none  Memory:no reported significant change  Mood changes: no change  Sleep:no issues  Falls:none  Med Changes/OTC meds:none  Pain:no issues  Bmno issues  Appetite:eats % meals  Weight:gained about 16lbs in the last year.      Medical Hx reviewed. See below.   Past Medical History:   Diagnosis Date   • Abrasion, left lesser toe(s), initial encounter 2021    Abrasion of toe of left foot, initial encounter   • Colon polyps 2023   • Convulsions (Multi) 2023   • Decreased white blood cell count, unspecified 2020    WBC decreased   • Disorder of lipoprotein metabolism, unspecified 2014    Cholesterol serum decreased   • Encounter for gynecological examination (general) (routine) without abnormal findings 10/17/2016    Encounter for cervical Pap smear with pelvic exam   • Encounter for screening for malignant neoplasm of cervix     Encounter for Papanicolaou smear for  cervical cancer screening   • Encounter for screening for malignant neoplasm of colon 06/23/2015    Encounter for screening colonoscopy   • Erythema intertrigo 10/17/2016    Intertrigo   • Glycosuria 11/03/2021    Sugar urinary present   • Intertrigo 11/03/2023   • Intra-abdominal abscess (Multi) 11/03/2023   • 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   • Lichen sclerosus et atrophicus 11/03/2023   • Major depressive disorder 10/26/2023   • Malignant neoplasm of thyroid gland (Multi) 06/13/2013    Malignant neoplasm of thyroid gland   • NPH (normal pressure hydrocephalus) (Multi) 11/03/2023   • Osteoarthrosis, localized, primary, knee 11/03/2023   • Other conditions influencing health status 07/15/2015    History of cough   • Other disorders of pituitary gland     Empty sella syndrome   • Other specified cough 03/16/2020    Upper airway cough syndrome   • Other specified personal risk factors, not elsewhere classified 10/17/2016    Encounter for gynecologic examination for high-risk patient covered by Medicare   • Other symptoms and signs involving the musculoskeletal system 10/12/2021    Weakness of both lower extremities   • Pain in right hip 07/12/2021    Right hip pain   • Pain in unspecified ankle and joints of unspecified foot 11/24/2014    Ankle pain   • Pain in unspecified foot 11/24/2014    Heel pain   • Pain in unspecified foot 08/02/2021    Foot pain   • Pain in unspecified knee 12/03/2020    Knee pain   • Pain in unspecified knee 11/04/2021    Joint pain, knee   • Pain, unspecified 04/12/2022    Acute pain   • Personal history of other diseases of the female genital tract 02/21/2017    History of breast pain   • Personal history of other diseases of the nervous system and sense organs 02/25/2022    History of peripheral neuropathy   • Personal history of other diseases of the nervous system and sense organs 11/24/2014    History of conjunctivitis   • Personal history of other endocrine, nutritional and metabolic disease  06/29/2018    History of hyperglycemia   • Personal history of other endocrine, nutritional and metabolic disease 11/03/2021    History of hyperglycemia   • Personal history of other endocrine, nutritional and metabolic disease     History of hyperlipidemia   • Personal history of other medical treatment     History of screening mammography   • Personal history of other medical treatment 02/05/2020    History of screening mammography   • Personal history of other specified conditions 04/24/2018    History of urinary frequency   • Personal history of other specified conditions 04/28/2020    History of convulsions   • Personal history of other specified conditions 10/12/2021    History of gait disorder   • Personal history of urinary (tract) infections 09/05/2019    History of urinary tract infection   • Physical deconditioning 10/26/2023   • Poor balance 11/03/2023   • Prediabetes 09/05/2019    Prediabetes   • Prediabetes 10/08/2021    Pre-diabetes   • Pulmonary embolism 11/03/2023   • Trochanteric bursitis of right hip 11/03/2023   • Unspecified convulsions (Multi)     Seizures   • Unspecified fracture of left toe(s), initial encounter for closed fracture 08/24/2021    Toe fracture, left   • Unspecified injury of unspecified foot, initial encounter 04/12/2021    Toe injury     Past Surgical History:   Procedure Laterality Date   • KNEE SURGERY  08/23/2013    Knee Surgery Left   • MR HEAD ANGIO WO IV CONTRAST  10/10/2020    [place] ANGIO [place] [facility] [place] [place]   • MR NECK ANGIO WO IV CONTRAST  10/10/2020    [place] NECK ANGIO WO IV [place] [facility] [place] [place]   • OTHER SURGICAL HISTORY  02/25/2022    Neck surgery   • TONSILLECTOMY  08/23/2013    Tonsillectomy   • TOTAL THYROIDECTOMY  10/21/2013    Thyroid Surgery Total Thyroidectomy   • TUBAL LIGATION  08/23/2013    Tubal Ligation   • US GUIDED PERCUTANEOUS PERITONEAL OR RETROPERITONEAL FLUID COLLECTION DRAINAGE  8/1/2022    US GUIDED  PERCUTANEOUS PERITONEAL OR RETROPERITONEAL FLUID COLLECTION DRAINAGE 8/1/2022 [facility] CLINICAL LEGAC[facility]     Family History   Problem Relation Name Age of Onset   • Memory loss Mother     • Heart disease Father     • Hyperlipidemia Father     • Depression Daughter     • Autism Son     • Depression Son     • Diabetes Maternal Grandmother       Social History     Tobacco Use   • Smoking status: Never     Passive exposure: Never   • Smokeless tobacco: Never   Substance Use Topics   • Alcohol use: Not Currently       Allergies: Ciprofloxacin, Dilantin [phenytoin sodium extended], Codeine, Naproxen, and Phenytoin    Medications reviewed and reconciled.   Current Outpatient Medications   Medication Sig Dispense Refill   • acetaminophen (Tylenol) 325 mg tablet Take 2 tablets (650 mg) by mouth every 4 hours if needed. DO NOT EXCEED 3000 MG IN 24 HOURS     • carboxymethylcellulose (Refresh Plus) 0.5 % ophthalmic solution Administer 1 drop into both eyes 4 times a day.     • cholecalciferol (Vitamin D-3) 25 MCG (1000 UT) tablet Take 4 tablets (4,000 Units) by mouth once daily.     • diclofenac sodium 1 % kit every 6 hours.  apply 4 G to lower ext or 2G to upper ext up to 4x per day prn pain     • escitalopram (Lexapro) 5 mg tablet Take 1 tablet (5 mg) by mouth once daily.     • eucerin cream Apply topically 2 times a day. To affected areas     • hydrocortisone 1 % cream Apply topically 2 times a day as needed. To affected areas     • lacosamide (Vimpat) 100 mg tablet Take 1 tablet (100 mg) by mouth every 12 hours. 60 tablet 5   • levETIRAcetam (Keppra) 750 mg tablet Take 1 tablet (750 mg) by mouth 2 times a day.     • levothyroxine (Synthroid) 88 mcg tablet Take 1 tablet (88 mcg) by mouth once daily.     • pantoprazole (ProtoNix) 20 mg EC tablet Take 1 tablet (20 mg) by mouth once daily in the morning. Take before meals. Do not crush, chew, or split.     • simvastatin (Zocor) 20 mg tablet Take 1 tablet (20 mg) by mouth once daily  in the evening.       No current facility-administered medications for this visit.       Objective  /55   Pulse 54   Temp 36.3 °C (97.4 °F)   Resp 16   Wt 91.4 kg (201 lb 9.6 oz)   SpO2 96%   BMI 34.60 kg/m²   Physical Exam  Vitals reviewed.   Constitutional:       General: She is not in acute distress.  HENT:      Head: Normocephalic and atraumatic.      Nose: Nose normal.      Mouth/Throat:      Mouth: Mucous membranes are moist.      Pharynx: Oropharynx is clear.   Pulmonary:      Effort: Pulmonary effort is normal.   Abdominal:      General: Abdomen is flat. Bowel sounds are normal.      Palpations: Abdomen is soft.   Musculoskeletal:         General: Normal range of motion.      Cervical back: Normal range of motion and neck supple.      Right lower leg: No edema.      Left lower leg: No edema.      Comments: B/l foot drop   Skin:     General: Skin is warm and dry.      Capillary Refill: Capillary refill takes less than 2 seconds.   Neurological:      General: No focal deficit present.      Mental Status: She is alert. Mental status is at baseline. She is disoriented.      Gait: Gait abnormal (sitting in w/c watching tv).      Comments: Cognitive impairment noted.   Psychiatric:         Mood and Affect: Mood normal.         Labs:  Most current labs reviewed see below              10/14/2024 Keppra level was 30ug/mL      Lab Results   Component Value Date    WBC 5.5 08/12/2022    HGB 8.8 (L) 08/12/2022    HCT 30.1 (L) 08/12/2022     (H) 08/12/2022     (H) 08/12/2022    LYMPHOPCT 9.7 08/03/2022    RBC 2.99 (L) 08/12/2022    MCHC 29.2 (L) 08/12/2022    RDW 13.9 08/12/2022     Lab Results   Component Value Date     08/12/2022    K 4.0 08/12/2022     08/12/2022    CO2 25 08/12/2022    BUN 9 08/12/2022    CREATININE 0.33 (L) 08/12/2022    GLUCOSE 110 (H) 08/12/2022    CALCIUM 8.5 (L) 08/12/2022    PROT 6.3 (L) 07/30/2022    BILITOT 0.6 07/30/2022    ALKPHOS 51 07/30/2022    AST  "52 (H) 07/30/2022    ALT 14 07/30/2022     Lab Results   Component Value Date    TSH 23.69 (H) 08/11/2022    TSH 0.71 12/23/2020    TSH 0.47 08/26/2019     Lab Results   Component Value Date    FREET4 0.70 (L) 08/11/2022    FREET4 0.72 (L) 08/11/2022    FREET4 1.46 12/23/2020     Lab Results   Component Value Date    YDSUCEEH15 670 10/23/2021     Lab Results   Component Value Date    HGBA1C 6.6 (A) 03/26/2022    HGBA1C 6.0 (A) 11/06/2021    HGBA1C 6.2 (A) 09/24/2021     No results found for: \"VITD25\"     Imaging:  Most current imaging studies reviewed: no recent imaging studies ordered    Assessment/Plan   Problem List Items Addressed This Visit             ICD-10-CM       Cardiac and Vasculature    Bradycardia R00.1     -For Holter monitor again.  -Asymptomatic.  Not on any beta-blocker  -Chronic.  I suspect baseline heart rate when the rate is 40 to 50s.  When asleep around 30s.  -Check TSH         Primary hypertension I10     -Controlled.  Continue lisinopril 5 mg daily            Endocrine/Metabolic    Hypothyroidism E03.9     -Continue levothyroxine 88 mcg daily.  For TSH         Mild vitamin D deficiency E55.9     -Awaiting vitamin D level         Prediabetes R73.03     -Awaiting A1c level         Vitamin B12 deficiency E53.8     -Discontinue vitamin B12.  Recheck B12 in 1 to 3 months            Gastrointestinal and Abdominal    Gastroesophageal reflux disease without esophagitis K21.9     -Asymptomatic.  Reduce Protonix to 20 mg daily            Genitourinary and Reproductive    Hypomagnesemia E83.42     -DC magnesium.  Check magnesium level on next routine lab.  Patient not on any diuretics            Neuro    Seizure disorder (Multi) - Primary G40.909     -Will need CBC and LFTs every 6 months.  Continue Keppra 750 mg twice daily and Vimpat 100 mg twice daily.  -Needs Vimpat level.  Will order         Dementia without behavioral disturbance, psychotic disturbance, mood disturbance, or anxiety F03.90     " -Suspected to have NPH.  However, will not be a candidate for shunt secondary to debility per neurology            Discussed case with: patient,  facility staff (nurse, aid, PT/OT/SLP, SW and/or DON), and Geriatrics care team      Electronically signed by: Nguyen Ring MD      Electronically Signed By: Nguyen Ring MD   12/4/24  8:32 PM

## 2024-12-04 VITALS
OXYGEN SATURATION: 96 % | HEART RATE: 54 BPM | RESPIRATION RATE: 16 BRPM | WEIGHT: 201.6 LBS | TEMPERATURE: 97.4 F | BODY MASS INDEX: 34.6 KG/M2 | SYSTOLIC BLOOD PRESSURE: 106 MMHG | DIASTOLIC BLOOD PRESSURE: 55 MMHG

## 2024-12-04 DIAGNOSIS — R00.1 BRADYCARDIA: Primary | ICD-10-CM

## 2024-12-04 PROBLEM — E53.8 VITAMIN B12 DEFICIENCY: Status: ACTIVE | Noted: 2024-12-04

## 2024-12-04 PROBLEM — I10 PRIMARY HYPERTENSION: Status: ACTIVE | Noted: 2024-12-04

## 2024-12-04 PROBLEM — F03.90 DEMENTIA WITHOUT BEHAVIORAL DISTURBANCE, PSYCHOTIC DISTURBANCE, MOOD DISTURBANCE, OR ANXIETY: Status: ACTIVE | Noted: 2024-12-04

## 2024-12-04 PROBLEM — E83.42 HYPOMAGNESEMIA: Status: ACTIVE | Noted: 2024-12-04

## 2024-12-04 RX ORDER — PANTOPRAZOLE SODIUM 20 MG/1
20 TABLET, DELAYED RELEASE ORAL
COMMUNITY

## 2024-12-04 RX ORDER — LEVETIRACETAM 750 MG/1
750 TABLET ORAL 2 TIMES DAILY
COMMUNITY

## 2024-12-04 ASSESSMENT — ENCOUNTER SYMPTOMS: SEIZURES: 1

## 2024-12-04 NOTE — PROGRESS NOTES
Division: Geriatrics  Date of Service: ***  Visit Type: Skilled Rehabilitation Unit Follow-up Visit    Chief complaint/Reason for follow-up: No chief complaint on file.    Subjective   Ms. Shikha Soliz 79 y.o. year old female is admitted to [facility] for skilled rehab since *** after hospitalization for ***.     Interval Hx:  [facility] Collateral history obtained due to***  HPI  ***    1 day after the pt visit, on 12/4/2024 at 7AM, I got called for HR of 33, asymptomatic. Taken when pt just woke up.  Vital signs stable at /59, temperature 97.6, RR 18, O2 saturation 98% on room air.      Medical Hx reviewed. See below.     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    Cholesterol serum decreased    Encounter for gynecological examination (general) (routine) without abnormal findings 10/17/2016    Encounter for cervical Pap smear with pelvic exam    Encounter for screening for malignant neoplasm of cervix     Encounter for Papanicolaou smear for cervical cancer screening    Encounter for screening for malignant neoplasm of colon 06/23/2015    Encounter for screening colonoscopy    Erythema intertrigo 10/17/2016    Intertrigo    Glycosuria 11/03/2021    Sugar urinary present    Intertrigo 11/03/2023    Intra-abdominal abscess (Multi) 11/03/2023    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    Lichen sclerosus et atrophicus 11/03/2023    Major depressive disorder 10/26/2023    Malignant neoplasm of thyroid gland (Multi) 06/13/2013    Malignant neoplasm of thyroid gland    NPH (normal pressure hydrocephalus) (Multi) 11/03/2023    Osteoarthrosis, localized, primary, knee 11/03/2023    Other  conditions influencing health status 07/15/2015    History of cough    Other disorders of pituitary gland     Empty sella syndrome    Other specified cough 03/16/2020    Upper airway cough syndrome    Other specified personal risk factors, not elsewhere classified 10/17/2016    Encounter for gynecologic examination for high-risk patient covered by Medicare    Other symptoms and signs involving the musculoskeletal system 10/12/2021    Weakness of both lower extremities    Pain in right hip 07/12/2021    Right hip pain    Pain in unspecified ankle and joints of unspecified foot 11/24/2014    Ankle pain    Pain in unspecified foot 11/24/2014    Heel pain    Pain in unspecified foot 08/02/2021    Foot pain    Pain in unspecified knee 12/03/2020    Knee pain    Pain in unspecified knee 11/04/2021    Joint pain, knee    Pain, unspecified 04/12/2022    Acute pain    Personal history of other diseases of the female genital tract 02/21/2017    History of breast pain    Personal history of other diseases of the nervous system and sense organs 02/25/2022    History of peripheral neuropathy    Personal history of other diseases of the nervous system and sense organs 11/24/2014    History of conjunctivitis    Personal history of other endocrine, nutritional and metabolic disease 06/29/2018    History of hyperglycemia    Personal history of other endocrine, nutritional and metabolic disease 11/03/2021    History of hyperglycemia    Personal history of other endocrine, nutritional and metabolic disease     History of hyperlipidemia    Personal history of other medical treatment     History of screening mammography    Personal history of other medical treatment 02/05/2020    History of screening mammography    Personal history of other specified conditions 04/24/2018    History of urinary frequency    Personal history of other specified conditions 04/28/2020    History of convulsions    Personal history of other specified  conditions 10/12/2021    History of gait disorder    Personal history of urinary (tract) infections 09/05/2019    History of urinary tract infection    Physical deconditioning 10/26/2023    Poor balance 11/03/2023    Prediabetes 09/05/2019    Prediabetes    Prediabetes 10/08/2021    Pre-diabetes    Pulmonary embolism 11/03/2023    Trochanteric bursitis of right hip 11/03/2023    Unspecified convulsions (Multi)     Seizures    Unspecified fracture of left toe(s), initial encounter for closed fracture 08/24/2021    Toe fracture, left    Unspecified injury of unspecified foot, initial encounter 04/12/2021    Toe injury     Past Surgical History:   Procedure Laterality Date    KNEE SURGERY  08/23/2013    Knee Surgery Left    MR HEAD ANGIO WO IV CONTRAST  10/10/2020    MR HEAD ANGIO WO IV CONTRAST 10/10/2020 [facility]    MR NECK ANGIO WO IV CONTRAST  10/10/2020    MR NECK ANGIO WO IV CONTRAST 10/10/2020 [facility]    OTHER SURGICAL HISTORY  02/25/2022    Neck surgery    TONSILLECTOMY  08/23/2013    Tonsillectomy    TOTAL THYROIDECTOMY  10/21/2013    Thyroid Surgery Total Thyroidectomy    TUBAL LIGATION  08/23/2013    Tubal Ligation    US GUIDED PERCUTANEOUS PERITONEAL OR RETROPERITONEAL FLUID COLLECTION DRAINAGE  8/1/2022    US GUIDED PERCUTANEOUS PERITONEAL OR RETROPERITONEAL FLUID COLLECTION DRAINAGE 8/1/2022 [facility]ICAL LEGAC[facility]     Family History   Problem Relation Name Age of Onset    Memory loss Mother      Heart disease Father      Hyperlipidemia Father      Depression Daughter      Autism Son      Depression Son      Diabetes Maternal Grandmother       Social History     Tobacco Use    Smoking status: Never     Passive exposure: Never    Smokeless tobacco: Never   Substance Use Topics    Alcohol use: Not Currently       Allergies: Ciprofloxacin, Dilantin [phenytoin sodium extended], Codeine, Naproxen, and Phenytoin      Medications reviewed and reconciled.   Current Outpatient Medications    Medication Sig Dispense Refill    acetaminophen (Tylenol) 325 mg tablet Take 2 tablets (650 mg) by mouth every 4 hours if needed. DO NOT EXCEED 3000 MG IN 24 HOURS      carboxymethylcellulose (Refresh Plus) 0.5 % ophthalmic solution Administer 1 drop into both eyes 4 times a day.      cholecalciferol (Vitamin D-3) 25 MCG (1000 UT) tablet Take 3 tablets (3,000 Units) by mouth once daily.      cyanocobalamin (Vitamin B-12) 500 mcg tablet Take 1 tablet (500 mcg) by mouth once daily.      diclofenac sodium 1 % kit every 6 hours.  apply 4 G to lower ext or 2G to upper ext up to 4x per day prn pain      escitalopram (Lexapro) 5 mg tablet Take 1 tablet (5 mg) by mouth once daily.      eucerin cream Apply topically 2 times a day. To affected areas      hydroCHLOROthiazide (Microzide) 12.5 mg tablet Take 1 tablet (12.5 mg) by mouth once daily.      hydrocortisone 1 % cream Apply topically 2 times a day as needed. To affected areas      lacosamide (Vimpat) 100 mg tablet Take 1 tablet (100 mg) by mouth every 12 hours. 60 tablet 5    levETIRAcetam (Keppra) 1,000 mg tablet Take 1 tablet (1,000 mg) by mouth 2 times a day. 60 tablet 11    levothyroxine (Synthroid) 88 mcg tablet Take 1 tablet (88 mcg) by mouth once daily.      Nystop 100,000 unit/gram powder Apply topically 2 times a day as needed. To affected areas      ondansetron (Zofran) 4 mg tablet Take 1 tablet (4 mg) by mouth every 6 hours if needed for nausea.      pantoprazole (ProtoNix) 40 mg EC tablet Take 1 tablet (40 mg) by mouth once daily.      simvastatin (Zocor) 20 mg tablet Take 1 tablet (20 mg) by mouth once daily in the evening.       No current facility-administered medications for this visit.       Objective   There were no vitals taken for this visit.  Physical Exam  ***        Labs:  Most current labs reviewed: {AGLABS:31316}      Imaging:   Most current imaging studies reviewed: {AGIMAGIN}      Assessment/Plan     {Assess/PlanSmartLinks:98378}    Discussed case with: {AGHXFROM:81983}    Electronically signed by: Nguyen Ring MD

## 2024-12-05 NOTE — ASSESSMENT & PLAN NOTE
-For Holter monitor again.  -Asymptomatic.  Not on any beta-blocker  -Chronic.  I suspect baseline heart rate when the rate is 40 to 50s.  When asleep around 30s.  -Check TSH

## 2024-12-05 NOTE — PROGRESS NOTES
Division: Geriatrics  Date of Service: 12/4/2024  Visit Type: Long-Term Care Regulatory Follow-up Visit      Chief Complaint: Seizures; 60 day visit    Subjective   History obtained from  facility staff (nurse, aid, PT/OT/SLP, JOSE and/or DON), medical records, and Geriatrics care team. Collateral history obtained due to dementia    Seizures        Had an appt w neurology on 10/2024. Brought by . Stable. Will need q6months CBC and LFT and levels of Keppra and Vimpat.     No interval seizures.    Dementia - pt unable to recall the visit with neurology. Unable to recall her medical hx.     Chronic bradycardia - HR usually when awake is around 40s-50s. Holter monitor was ordered previously but unsure of the status.   1 day after the pt visit, on 12/4/2024 at 7AM, I got called for HR of 33, asymptomatic. Taken when pt just woke up.  Vital signs stable at /59, temperature 97.6, RR 18, O2 saturation 98% on room air.    Hospitalization/ER visits:none  Memory:no reported significant change  Mood changes: no change  Sleep:no issues  Falls:none  Med Changes/OTC meds:none  Pain:no issues  Bmno issues  Appetite:eats % meals  Weight:gained about 16lbs in the last year.      Medical Hx reviewed. See below.   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    Cholesterol serum decreased    Encounter for gynecological examination (general) (routine) without abnormal findings 10/17/2016    Encounter for cervical Pap smear with pelvic exam    Encounter for screening for malignant neoplasm of cervix     Encounter for Papanicolaou smear for cervical cancer screening    Encounter for screening for malignant neoplasm of  colon 06/23/2015    Encounter for screening colonoscopy    Erythema intertrigo 10/17/2016    Intertrigo    Glycosuria 11/03/2021    Sugar urinary present    Intertrigo 11/03/2023    Intra-abdominal abscess (Multi) 11/03/2023    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    Lichen sclerosus et atrophicus 11/03/2023    Major depressive disorder 10/26/2023    Malignant neoplasm of thyroid gland (Multi) 06/13/2013    Malignant neoplasm of thyroid gland    NPH (normal pressure hydrocephalus) (Multi) 11/03/2023    Osteoarthrosis, localized, primary, knee 11/03/2023    Other conditions influencing health status 07/15/2015    History of cough    Other disorders of pituitary gland     Empty sella syndrome    Other specified cough 03/16/2020    Upper airway cough syndrome    Other specified personal risk factors, not elsewhere classified 10/17/2016    Encounter for gynecologic examination for high-risk patient covered by Medicare    Other symptoms and signs involving the musculoskeletal system 10/12/2021    Weakness of both lower extremities    Pain in right hip 07/12/2021    Right hip pain    Pain in unspecified ankle and joints of unspecified foot 11/24/2014    Ankle pain    Pain in unspecified foot 11/24/2014    Heel pain    Pain in unspecified foot 08/02/2021    Foot pain    Pain in unspecified knee 12/03/2020    Knee pain    Pain in unspecified knee 11/04/2021    Joint pain, knee    Pain, unspecified 04/12/2022    Acute pain    Personal history of other diseases of the female genital tract 02/21/2017    History of breast pain    Personal history of other diseases of the nervous system and sense organs 02/25/2022    History of peripheral neuropathy    Personal history of other diseases of the nervous system and sense organs 11/24/2014    History of conjunctivitis    Personal history of other endocrine, nutritional and metabolic disease 06/29/2018    History of hyperglycemia    Personal history of other endocrine, nutritional and metabolic  disease 11/03/2021    History of hyperglycemia    Personal history of other endocrine, nutritional and metabolic disease     History of hyperlipidemia    Personal history of other medical treatment     History of screening mammography    Personal history of other medical treatment 02/05/2020    History of screening mammography    Personal history of other specified conditions 04/24/2018    History of urinary frequency    Personal history of other specified conditions 04/28/2020    History of convulsions    Personal history of other specified conditions 10/12/2021    History of gait disorder    Personal history of urinary (tract) infections 09/05/2019    History of urinary tract infection    Physical deconditioning 10/26/2023    Poor balance 11/03/2023    Prediabetes 09/05/2019    Prediabetes    Prediabetes 10/08/2021    Pre-diabetes    Pulmonary embolism 11/03/2023    Trochanteric bursitis of right hip 11/03/2023    Unspecified convulsions (Multi)     Seizures    Unspecified fracture of left toe(s), initial encounter for closed fracture 08/24/2021    Toe fracture, left    Unspecified injury of unspecified foot, initial encounter 04/12/2021    Toe injury     Past Surgical History:   Procedure Laterality Date    KNEE SURGERY  08/23/2013    Knee Surgery Left    MR HEAD ANGIO WO IV CONTRAST  10/10/2020    MR HEAD ANGIO WO IV CONTRAST 10/10/2020 [facility]    MR NECK ANGIO WO IV CONTRAST  10/10/2020    MR NECK ANGIO WO IV CONTRAST 10/10/2020 [facility]    OTHER SURGICAL HISTORY  02/25/2022    Neck surgery    TONSILLECTOMY  08/23/2013    Tonsillectomy    TOTAL THYROIDECTOMY  10/21/2013    Thyroid Surgery Total Thyroidectomy    TUBAL LIGATION  08/23/2013    Tubal Ligation    US GUIDED PERCUTANEOUS PERITONEAL OR RETROPERITONEAL FLUID COLLECTION DRAINAGE  8/1/2022    US GUIDED PERCUTANEOUS PERITONEAL OR RETROPERITONEAL FLUID COLLECTION DRAINAGE 8/1/2022 [facility] CL[facility]ICAL LEGAC[facility]     Family History   Problem  Relation Name Age of Onset    Memory loss Mother      Heart disease Father      Hyperlipidemia Father      Depression Daughter      Autism Son      Depression Son      Diabetes Maternal Grandmother       Social History     Tobacco Use    Smoking status: Never     Passive exposure: Never    Smokeless tobacco: Never   Substance Use Topics    Alcohol use: Not Currently       Allergies: Ciprofloxacin, Dilantin [phenytoin sodium extended], Codeine, Naproxen, and Phenytoin    Medications reviewed and reconciled.   Current Outpatient Medications   Medication Sig Dispense Refill    acetaminophen (Tylenol) 325 mg tablet Take 2 tablets (650 mg) by mouth every 4 hours if needed. DO NOT EXCEED 3000 MG IN 24 HOURS      carboxymethylcellulose (Refresh Plus) 0.5 % ophthalmic solution Administer 1 drop into both eyes 4 times a day.      cholecalciferol (Vitamin D-3) 25 MCG (1000 UT) tablet Take 4 tablets (4,000 Units) by mouth once daily.      diclofenac sodium 1 % kit every 6 hours.  apply 4 G to lower ext or 2G to upper ext up to 4x per day prn pain      escitalopram (Lexapro) 5 mg tablet Take 1 tablet (5 mg) by mouth once daily.      eucerin cream Apply topically 2 times a day. To affected areas      hydrocortisone 1 % cream Apply topically 2 times a day as needed. To affected areas      lacosamide (Vimpat) 100 mg tablet Take 1 tablet (100 mg) by mouth every 12 hours. 60 tablet 5    levETIRAcetam (Keppra) 750 mg tablet Take 1 tablet (750 mg) by mouth 2 times a day.      levothyroxine (Synthroid) 88 mcg tablet Take 1 tablet (88 mcg) by mouth once daily.      pantoprazole (ProtoNix) 20 mg EC tablet Take 1 tablet (20 mg) by mouth once daily in the morning. Take before meals. Do not crush, chew, or split.      simvastatin (Zocor) 20 mg tablet Take 1 tablet (20 mg) by mouth once daily in the evening.       No current facility-administered medications for this visit.       Objective   /55   Pulse 54   Temp 36.3 °C (97.4 °F)    Resp 16   Wt 91.4 kg (201 lb 9.6 oz)   SpO2 96%   BMI 34.60 kg/m²   Physical Exam  Vitals reviewed.   Constitutional:       General: She is not in acute distress.  HENT:      Head: Normocephalic and atraumatic.      Nose: Nose normal.      Mouth/Throat:      Mouth: Mucous membranes are moist.      Pharynx: Oropharynx is clear.   Pulmonary:      Effort: Pulmonary effort is normal.   Abdominal:      General: Abdomen is flat. Bowel sounds are normal.      Palpations: Abdomen is soft.   Musculoskeletal:         General: Normal range of motion.      Cervical back: Normal range of motion and neck supple.      Right lower leg: No edema.      Left lower leg: No edema.      Comments: B/l foot drop   Skin:     General: Skin is warm and dry.      Capillary Refill: Capillary refill takes less than 2 seconds.   Neurological:      General: No focal deficit present.      Mental Status: She is alert. Mental status is at baseline. She is disoriented.      Gait: Gait abnormal (sitting in w/c watching tv).      Comments: Cognitive impairment noted.   Psychiatric:         Mood and Affect: Mood normal.         Labs:  Most current labs reviewed see below              10/14/2024 Keppra level was 30ug/mL      Lab Results   Component Value Date    WBC 5.5 08/12/2022    HGB 8.8 (L) 08/12/2022    HCT 30.1 (L) 08/12/2022     (H) 08/12/2022     (H) 08/12/2022    LYMPHOPCT 9.7 08/03/2022    RBC 2.99 (L) 08/12/2022    MCHC 29.2 (L) 08/12/2022    RDW 13.9 08/12/2022     Lab Results   Component Value Date     08/12/2022    K 4.0 08/12/2022     08/12/2022    CO2 25 08/12/2022    BUN 9 08/12/2022    CREATININE 0.33 (L) 08/12/2022    GLUCOSE 110 (H) 08/12/2022    CALCIUM 8.5 (L) 08/12/2022    PROT 6.3 (L) 07/30/2022    BILITOT 0.6 07/30/2022    ALKPHOS 51 07/30/2022    AST 52 (H) 07/30/2022    ALT 14 07/30/2022     Lab Results   Component Value Date    TSH 23.69 (H) 08/11/2022    TSH 0.71 12/23/2020    TSH 0.47 08/26/2019  "    Lab Results   Component Value Date    FREET4 0.70 (L) 08/11/2022    FREET4 0.72 (L) 08/11/2022    FREET4 1.46 12/23/2020     Lab Results   Component Value Date    [id number] 670 10/23/2021     Lab Results   Component Value Date    HGBA1C 6.6 (A) 03/26/2022    HGBA1C 6.0 (A) 11/06/2021    HGBA1C 6.2 (A) 09/24/2021     No results found for: \"VITD25\"     Imaging:  Most current imaging studies reviewed: no recent imaging studies ordered    Assessment/Plan    Problem List Items Addressed This Visit             ICD-10-CM       Cardiac and Vasculature    Bradycardia R00.1     -For Holter monitor again.  -Asymptomatic.  Not on any beta-blocker  -Chronic.  I suspect baseline heart rate when the rate is 40 to 50s.  When asleep around 30s.  -Check TSH         Primary hypertension I10     -Controlled.  Continue lisinopril 5 mg daily            Endocrine/Metabolic    Hypothyroidism E03.9     -Continue levothyroxine 88 mcg daily.  For TSH         Mild vitamin D deficiency E55.9     -Awaiting vitamin D level         Prediabetes R73.03     -Awaiting A1c level         Vitamin B12 deficiency E53.8     -Discontinue vitamin B12.  Recheck B12 in 1 to 3 months            Gastrointestinal and Abdominal    Gastroesophageal reflux disease without esophagitis K21.9     -Asymptomatic.  Reduce Protonix to 20 mg daily            Genitourinary and Reproductive    Hypomagnesemia E83.42     -DC magnesium.  Check magnesium level on next routine lab.  Patient not on any diuretics            Neuro    Seizure disorder (Multi) - Primary G40.909     -Will need CBC and LFTs every 6 months.  Continue Keppra 750 mg twice daily and Vimpat 100 mg twice daily.  -Needs Vimpat level.  Will order         Dementia without behavioral disturbance, psychotic disturbance, mood disturbance, or anxiety F03.90     -Suspected to have NPH.  However, will not be a candidate for shunt secondary to debility per neurology            Discussed case with: patient,  facility " staff (nurse, aid, PT/OT/SLP, SW and/or DON), and Geriatrics care team      Electronically signed by: Nguyen Ring MD

## 2024-12-05 NOTE — ASSESSMENT & PLAN NOTE
-Will need CBC and LFTs every 6 months.  Continue Keppra 750 mg twice daily and Vimpat 100 mg twice daily.  -Needs Vimpat level.  Will order

## 2024-12-05 NOTE — ASSESSMENT & PLAN NOTE
-Suspected to have NPH.  However, will not be a candidate for shunt secondary to debility per neurology

## 2024-12-10 ENCOUNTER — APPOINTMENT (OUTPATIENT)
Dept: ORTHOPEDIC SURGERY | Facility: HOSPITAL | Age: 79
End: 2024-12-10
Payer: MEDICARE

## 2024-12-19 ENCOUNTER — TELEPHONE (OUTPATIENT)
Dept: POST ACUTE CARE | Facility: EXTERNAL LOCATION | Age: 79
End: 2024-12-19
Payer: MEDICARE

## 2024-12-19 NOTE — TELEPHONE ENCOUNTER
Hello,     I just uploaded the levels of her AEDs under Media. They have dates on when they were drawn.     LYNNETTE Ring MD   Geriatrician  [facility] physician, Alexys Luis

## 2024-12-24 DIAGNOSIS — G40.909 SEIZURE DISORDER (MULTI): ICD-10-CM

## 2024-12-24 RX ORDER — LACOSAMIDE 100 MG/1
100 TABLET ORAL EVERY 12 HOURS
Qty: 60 TABLET | Refills: 5 | Status: SHIPPED | OUTPATIENT
Start: 2024-12-24 | End: 2025-12-24

## 2025-01-06 ENCOUNTER — APPOINTMENT (OUTPATIENT)
Dept: ORTHOPEDIC SURGERY | Facility: HOSPITAL | Age: 80
End: 2025-01-06
Payer: MEDICARE

## 2025-01-07 ENCOUNTER — NURSING HOME VISIT (OUTPATIENT)
Dept: POST ACUTE CARE | Facility: EXTERNAL LOCATION | Age: 80
End: 2025-01-07
Payer: MEDICARE

## 2025-01-07 VITALS
DIASTOLIC BLOOD PRESSURE: 55 MMHG | RESPIRATION RATE: 18 BRPM | OXYGEN SATURATION: 97 % | SYSTOLIC BLOOD PRESSURE: 126 MMHG | BODY MASS INDEX: 34.57 KG/M2 | HEART RATE: 60 BPM | WEIGHT: 201.4 LBS | TEMPERATURE: 97.5 F

## 2025-01-07 DIAGNOSIS — R73.03 PREDIABETES: ICD-10-CM

## 2025-01-07 DIAGNOSIS — R11.2 NAUSEA AND VOMITING, UNSPECIFIED VOMITING TYPE: Primary | ICD-10-CM

## 2025-01-07 DIAGNOSIS — R19.5 LOOSE STOOLS: ICD-10-CM

## 2025-01-07 PROCEDURE — 99309 SBSQ NF CARE MODERATE MDM 30: CPT | Performed by: INTERNAL MEDICINE

## 2025-01-07 RX ORDER — LISINOPRIL 5 MG/1
5 TABLET ORAL DAILY
COMMUNITY

## 2025-01-07 NOTE — PROGRESS NOTES
Division: Geriatrics  Date of Service: 1/7/2025  Visit Type: Long-Term Care Acute Follow-up Visit      Chief Complaint: Nausea and vomiting and Diarrhea; Acute visit    Subjective   History obtained from patient,  facility staff (nurse, aid, PT/OT/SLP, JOSE and/or DON), and Geriatrics care team. Collateral history obtained due to patient's cognitive deficits    HPI   -Had an episode of nausea and vomiting yesterday with 3 episodes of loose stool.  On-call provider recommended to continue monitoring.  Vital signs were stable.  - per today, no more episodes of nausea and vomiting.  Has had 3-4 loose stools today.  Patient refused morning meds today.  Did not specify the reason.  -Denies any UTI symptoms  -Per patient, she just generally does not feel well today.  Cannot specify any symptoms.  -Reviewed records.  Patient has good p.o. intake prior to last night.  Intermittently eats breakfast.  Weight has been stable.      Medical Hx reviewed. See below.   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    Cholesterol serum decreased    Encounter for gynecological examination (general) (routine) without abnormal findings 10/17/2016    Encounter for cervical Pap smear with pelvic exam    Encounter for screening for malignant neoplasm of cervix     Encounter for Papanicolaou smear for cervical cancer screening    Encounter for screening for malignant neoplasm of colon 06/23/2015    Encounter for screening colonoscopy    Erythema intertrigo 10/17/2016    Intertrigo    Glycosuria 11/03/2021    Sugar urinary present    Intertrigo 11/03/2023    Intra-abdominal abscess (Multi) 11/03/2023    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    Lichen  sclerosus et atrophicus 11/03/2023    Major depressive disorder 10/26/2023    Malignant neoplasm of thyroid gland (Multi) 06/13/2013    Malignant neoplasm of thyroid gland    NPH (normal pressure hydrocephalus) (Multi) 11/03/2023    Osteoarthrosis, localized, primary, knee 11/03/2023    Other conditions influencing health status 07/15/2015    History of cough    Other disorders of pituitary gland     Empty sella syndrome    Other specified cough 03/16/2020    Upper airway cough syndrome    Other specified personal risk factors, not elsewhere classified 10/17/2016    Encounter for gynecologic examination for high-risk patient covered by Medicare    Other symptoms and signs involving the musculoskeletal system 10/12/2021    Weakness of both lower extremities    Pain in right hip 07/12/2021    Right hip pain    Pain in unspecified ankle and joints of unspecified foot 11/24/2014    Ankle pain    Pain in unspecified foot 11/24/2014    Heel pain    Pain in unspecified foot 08/02/2021    Foot pain    Pain in unspecified knee 12/03/2020    Knee pain    Pain in unspecified knee 11/04/2021    Joint pain, knee    Pain, unspecified 04/12/2022    Acute pain    Personal history of other diseases of the female genital tract 02/21/2017    History of breast pain    Personal history of other diseases of the nervous system and sense organs 02/25/2022    History of peripheral neuropathy    Personal history of other diseases of the nervous system and sense organs 11/24/2014    History of conjunctivitis    Personal history of other endocrine, nutritional and metabolic disease 06/29/2018    History of hyperglycemia    Personal history of other endocrine, nutritional and metabolic disease 11/03/2021    History of hyperglycemia    Personal history of other endocrine, nutritional and metabolic disease     History of hyperlipidemia    Personal history of other medical treatment     History of screening mammography    Personal history of other  medical treatment 02/05/2020    History of screening mammography    Personal history of other specified conditions 04/24/2018    History of urinary frequency    Personal history of other specified conditions 04/28/2020    History of convulsions    Personal history of other specified conditions 10/12/2021    History of gait disorder    Personal history of urinary (tract) infections 09/05/2019    History of urinary tract infection    Physical deconditioning 10/26/2023    Poor balance 11/03/2023    Prediabetes 09/05/2019    Prediabetes    Prediabetes 10/08/2021    Pre-diabetes    Pulmonary embolism 11/03/2023    Trochanteric bursitis of right hip 11/03/2023    Unspecified convulsions (Multi)     Seizures    Unspecified fracture of left toe(s), initial encounter for closed fracture 08/24/2021    Toe fracture, left    Unspecified injury of unspecified foot, initial encounter 04/12/2021    Toe injury     Past Surgical History:   Procedure Laterality Date    KNEE SURGERY  08/23/2013    Knee Surgery Left    MR HEAD ANGIO WO IV CONTRAST  10/10/2020    MR HEAD ANGIO WO IV CONTRAST 10/10/2020 [facility]    MR NECK ANGIO WO IV CONTRAST  10/10/2020    MR NECK ANGIO WO IV CONTRAST 10/10/2020 [facility]    OTHER SURGICAL HISTORY  02/25/2022    Neck surgery    TONSILLECTOMY  08/23/2013    Tonsillectomy    TOTAL THYROIDECTOMY  10/21/2013    Thyroid Surgery Total Thyroidectomy    TUBAL LIGATION  08/23/2013    Tubal Ligation    US GUIDED PERCUTANEOUS PERITONEAL OR RETROPERITONEAL FLUID COLLECTION DRAINAGE  8/1/2022    US GUIDED PERCUTANEOUS PERITONEAL OR RETROPERITONEAL FLUID COLLECTION DRAINAGE 8/1/2022 [facility] CL[facility]ICAL LEGACY     Family History   Problem Relation Name Age of Onset    Memory loss Mother      Heart disease Father      Hyperlipidemia Father      Depression Daughter      Autism Son      Depression Son      Diabetes Maternal Grandmother       Social History     Tobacco Use    Smoking status: Never      Passive exposure: Never    Smokeless tobacco: Never   Substance Use Topics    Alcohol use: Not Currently       Allergies: Ciprofloxacin, Dilantin [phenytoin sodium extended], Codeine, Naproxen, and Phenytoin    Medications reviewed and reconciled.   Current Outpatient Medications   Medication Sig Dispense Refill    acetaminophen (Tylenol) 325 mg tablet Take 2 tablets (650 mg) by mouth every 4 hours if needed. DO NOT EXCEED 3000 MG IN 24 HOURS      carboxymethylcellulose (Refresh Plus) 0.5 % ophthalmic solution Administer 1 drop into both eyes 4 times a day.      cholecalciferol (Vitamin D-3) 25 MCG (1000 UT) tablet Take 4 tablets (4,000 Units) by mouth once daily.      diclofenac sodium 1 % kit every 6 hours.  apply 4 G to lower ext or 2G to upper ext up to 4x per day prn pain      escitalopram (Lexapro) 5 mg tablet Take 1 tablet (5 mg) by mouth once daily.      eucerin cream Apply topically 2 times a day. To affected areas      hydrocortisone 1 % cream Apply topically 2 times a day as needed. To affected areas      lacosamide (Vimpat) 100 mg tablet Take 1 tablet (100 mg) by mouth every 12 hours. 60 tablet 5    levETIRAcetam (Keppra) 750 mg tablet Take 1 tablet (750 mg) by mouth 2 times a day.      levothyroxine (Synthroid) 88 mcg tablet Take 1 tablet (88 mcg) by mouth once daily.      lisinopril 5 mg tablet Take 1 tablet (5 mg) by mouth once daily.      pantoprazole (ProtoNix) 20 mg EC tablet Take 1 tablet (20 mg) by mouth once daily in the morning. Take before meals. Do not crush, chew, or split.      simvastatin (Zocor) 20 mg tablet Take 1 tablet (20 mg) by mouth once daily in the evening.       No current facility-administered medications for this visit.       Objective   /55   Pulse 60   Temp 36.4 °C (97.5 °F)   Resp 18   Wt 91.4 kg (201 lb 6.4 oz)   SpO2 97%   BMI 34.57 kg/m²   Physical Exam  Vitals reviewed.   Constitutional:       General: She is not in acute distress.  HENT:      Head:  Normocephalic and atraumatic.      Nose: Nose normal.      Mouth/Throat:      Mouth: Mucous membranes are moist.      Pharynx: Oropharynx is clear.   Pulmonary:      Effort: Pulmonary effort is normal.   Abdominal:      General: Abdomen is flat. Bowel sounds are normal. There is no distension.      Palpations: Abdomen is soft. There is no mass.      Tenderness: There is no abdominal tenderness. There is no right CVA tenderness, left CVA tenderness, guarding or rebound.      Hernia: No hernia is present.   Musculoskeletal:         General: Normal range of motion.      Cervical back: Normal range of motion and neck supple.      Right lower leg: No edema.      Left lower leg: No edema.      Comments: B/l foot drop   Skin:     General: Skin is warm and dry.      Capillary Refill: Capillary refill takes less than 2 seconds.   Neurological:      General: No focal deficit present.      Mental Status: She is alert. Mental status is at baseline. She is disoriented.      Gait: Gait abnormal (sitting in w/c watching tv).      Comments: Cognitive impairment noted.   Psychiatric:         Mood and Affect: Mood normal.         Labs:  Most current labs reviewed: no recent labs ordered  Lab Results   Component Value Date    WBC 5.5 08/12/2022    HGB 8.8 (L) 08/12/2022    HCT 30.1 (L) 08/12/2022     (H) 08/12/2022     (H) 08/12/2022    LYMPHOPCT 9.7 08/03/2022    RBC 2.99 (L) 08/12/2022    MCHC 29.2 (L) 08/12/2022    RDW 13.9 08/12/2022     Lab Results   Component Value Date     08/12/2022    K 4.0 08/12/2022     08/12/2022    CO2 25 08/12/2022    BUN 9 08/12/2022    CREATININE 0.33 (L) 08/12/2022    GLUCOSE 110 (H) 08/12/2022    CALCIUM 8.5 (L) 08/12/2022    PROT 6.3 (L) 07/30/2022    BILITOT 0.6 07/30/2022    ALKPHOS 51 07/30/2022    AST 52 (H) 07/30/2022    ALT 14 07/30/2022     Lab Results   Component Value Date    TSH 23.69 (H) 08/11/2022    TSH 0.71 12/23/2020    TSH 0.47 08/26/2019     Lab Results  "  Component Value Date    FREET4 0.70 (L) 08/11/2022    FREET4 0.72 (L) 08/11/2022    FREET4 1.46 12/23/2020     Lab Results   Component Value Date    [id number] 670 10/23/2021     Lab Results   Component Value Date    HGBA1C 6.6 (A) 03/26/2022    HGBA1C 6.0 (A) 11/06/2021    HGBA1C 6.2 (A) 09/24/2021     No results found for: \"VITD25\"     Imaging:  Most current imaging studies reviewed: no recent imaging studies ordered    Assessment/Plan    Problem List Items Addressed This Visit             ICD-10-CM    Prediabetes R73.03     -A1c recently has been 6.3%.  DC carb controlled diet         Nausea and vomiting - Primary R11.2    Loose stools R19.5       -Okay to give morning medications at 2 PM today.  However, instructed the nurse to hold off on AEDs secondary to them being close to the evening dose.  -Ordered stat CMP and CBC with differential, and C. Difficile  -Hold senna for 2 days  -Zofran 4 mg 3 times daily as needed for 3 days  -Has repeat BMP, magnesium and CBC with differential on 1/9/2025.  -Vital signs every shift x 3 days with parameters for nursing and when to call provider  - rapid covid and flu test were negative. Awaiting for confirmation results.    Discussed case with: patient,  facility staff (nurse, aid, PT/OT/SLP, SW and/or DON), and Geriatrics care team      Electronically signed by: Nguyen Ring MD  "

## 2025-01-07 NOTE — LETTER
Patient: Shikha Soliz  : 1945    Encounter Date: 2025      Division: Geriatrics  Date of Service: 2025  Visit Type: Long-Term Care Acute Follow-up Visit      Chief Complaint: Nausea and vomiting and Diarrhea; Acute visit    Subjective  History obtained from patient,  facility staff (nurse, aid, PT/OT/SLP, SW and/or DON), and Geriatrics care team. Collateral history obtained due to patient's cognitive deficits    HPI   -Had an episode of nausea and vomiting yesterday with 3 episodes of loose stool.  On-call provider recommended to continue monitoring.  Vital signs were stable.  - per today, no more episodes of nausea and vomiting.  Has had 3-4 loose stools today.  Patient refused morning meds today.  Did not specify the reason.  -Denies any UTI symptoms  -Per patient, she just generally does not feel well today.  Cannot specify any symptoms.  -Reviewed records.  Patient has good p.o. intake prior to last night.  Intermittently eats breakfast.  Weight has been stable.      Medical Hx reviewed. See below.   Past Medical History:   Diagnosis Date   • Abrasion, left lesser toe(s), initial encounter 2021    Abrasion of toe of left foot, initial encounter   • Colon polyps 2023   • Convulsions (Multi) 2023   • Decreased white blood cell count, unspecified 2020    WBC decreased   • Disorder of lipoprotein metabolism, unspecified 2014    Cholesterol serum decreased   • Encounter for gynecological examination (general) (routine) without abnormal findings 10/17/2016    Encounter for cervical Pap smear with pelvic exam   • Encounter for screening for malignant neoplasm of cervix     Encounter for Papanicolaou smear for cervical cancer screening   • Encounter for screening for malignant neoplasm of colon 2015    Encounter for screening colonoscopy   • Erythema intertrigo 10/17/2016    Intertrigo   • Glycosuria 2021    Sugar urinary present   • Intertrigo 2023   •  Intra-abdominal abscess (Multi) 11/03/2023   • 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   • Lichen sclerosus et atrophicus 11/03/2023   • Major depressive disorder 10/26/2023   • Malignant neoplasm of thyroid gland (Multi) 06/13/2013    Malignant neoplasm of thyroid gland   • NPH (normal pressure hydrocephalus) (Multi) 11/03/2023   • Osteoarthrosis, localized, primary, knee 11/03/2023   • Other conditions influencing health status 07/15/2015    History of cough   • Other disorders of pituitary gland     Empty sella syndrome   • Other specified cough 03/16/2020    Upper airway cough syndrome   • Other specified personal risk factors, not elsewhere classified 10/17/2016    Encounter for gynecologic examination for high-risk patient covered by Medicare   • Other symptoms and signs involving the musculoskeletal system 10/12/2021    Weakness of both lower extremities   • Pain in right hip 07/12/2021    Right hip pain   • Pain in unspecified ankle and joints of unspecified foot 11/24/2014    Ankle pain   • Pain in unspecified foot 11/24/2014    Heel pain   • Pain in unspecified foot 08/02/2021    Foot pain   • Pain in unspecified knee 12/03/2020    Knee pain   • Pain in unspecified knee 11/04/2021    Joint pain, knee   • Pain, unspecified 04/12/2022    Acute pain   • Personal history of other diseases of the female genital tract 02/21/2017    History of breast pain   • Personal history of other diseases of the nervous system and sense organs 02/25/2022    History of peripheral neuropathy   • Personal history of other diseases of the nervous system and sense organs 11/24/2014    History of conjunctivitis   • Personal history of other endocrine, nutritional and metabolic disease 06/29/2018    History of hyperglycemia   • Personal history of other endocrine, nutritional and metabolic disease 11/03/2021    History of hyperglycemia   • Personal history of other endocrine, nutritional and metabolic disease     History of hyperlipidemia   •  Personal history of other medical treatment     History of screening mammography   • Personal history of other medical treatment 02/05/2020    History of screening mammography   • Personal history of other specified conditions 04/24/2018    History of urinary frequency   • Personal history of other specified conditions 04/28/2020    History of convulsions   • Personal history of other specified conditions 10/12/2021    History of gait disorder   • Personal history of urinary (tract) infections 09/05/2019    History of urinary tract infection   • Physical deconditioning 10/26/2023   • Poor balance 11/03/2023   • Prediabetes 09/05/2019    Prediabetes   • Prediabetes 10/08/2021    Pre-diabetes   • Pulmonary embolism 11/03/2023   • Trochanteric bursitis of right hip 11/03/2023   • Unspecified convulsions (Multi)     Seizures   • Unspecified fracture of left toe(s), initial encounter for closed fracture 08/24/2021    Toe fracture, left   • Unspecified injury of unspecified foot, initial encounter 04/12/2021    Toe injury     Past Surgical History:   Procedure Laterality Date   • KNEE SURGERY  08/23/2013    Knee Surgery Left   • MR HEAD ANGIO WO IV CONTRAST  10/10/2020    MR HEAD ANGIO WO [place]AST [place] [facility]   • MR NECK ANGIO WO IV CONTRAST  10/10/2020    MR NECK ANGIO WO IV CONTRAST [place]10/[place] [facility]   • OTHER SURGICAL HISTORY  02/25/2022    Neck surgery   • TONSILLECTOMY  08/23/2013    Tonsillectomy   • TOTAL THYROIDECTOMY  10/21/2013    Thyroid Surgery Total Thyroidectomy   • TUBAL LIGATION  08/23/2013    Tubal Ligation   • US GUIDED PERCUTANEOUS PERITONEAL OR RETROPERITONEAL FLUID COLLECTION DRAINAGE  8/1/2022    US GUIDED PERCUTANEOUS PERITONEAL OR RETROPERITONEAL FLUID COLLECTION DRAINAGE 8/1/2022 [facility]ICAL [facility]GAC[facility]     Family History   Problem Relation Name Age of Onset   • Memory loss Mother     • Heart disease Father     • Hyperlipidemia Father     • Depression Daughter      • Autism Son     • Depression Son     • Diabetes Maternal Grandmother       Social History     Tobacco Use   • Smoking status: Never     Passive exposure: Never   • Smokeless tobacco: Never   Substance Use Topics   • Alcohol use: Not Currently       Allergies: Ciprofloxacin, Dilantin [phenytoin sodium extended], Codeine, Naproxen, and Phenytoin    Medications reviewed and reconciled.   Current Outpatient Medications   Medication Sig Dispense Refill   • acetaminophen (Tylenol) 325 mg tablet Take 2 tablets (650 mg) by mouth every 4 hours if needed. DO NOT EXCEED 3000 MG IN 24 HOURS     • carboxymethylcellulose (Refresh Plus) 0.5 % ophthalmic solution Administer 1 drop into both eyes 4 times a day.     • cholecalciferol (Vitamin D-3) 25 MCG (1000 UT) tablet Take 4 tablets (4,000 Units) by mouth once daily.     • diclofenac sodium 1 % kit every 6 hours.  apply 4 G to lower ext or 2G to upper ext up to 4x per day prn pain     • escitalopram (Lexapro) 5 mg tablet Take 1 tablet (5 mg) by mouth once daily.     • eucerin cream Apply topically 2 times a day. To affected areas     • hydrocortisone 1 % cream Apply topically 2 times a day as needed. To affected areas     • lacosamide (Vimpat) 100 mg tablet Take 1 tablet (100 mg) by mouth every 12 hours. 60 tablet 5   • levETIRAcetam (Keppra) 750 mg tablet Take 1 tablet (750 mg) by mouth 2 times a day.     • levothyroxine (Synthroid) 88 mcg tablet Take 1 tablet (88 mcg) by mouth once daily.     • lisinopril 5 mg tablet Take 1 tablet (5 mg) by mouth once daily.     • pantoprazole (ProtoNix) 20 mg EC tablet Take 1 tablet (20 mg) by mouth once daily in the morning. Take before meals. Do not crush, chew, or split.     • simvastatin (Zocor) 20 mg tablet Take 1 tablet (20 mg) by mouth once daily in the evening.       No current facility-administered medications for this visit.       Objective  /55   Pulse 60   Temp 36.4 °C (97.5 °F)   Resp 18   Wt 91.4 kg (201 lb 6.4 oz)    SpO2 97%   BMI 34.57 kg/m²   Physical Exam  Vitals reviewed.   Constitutional:       General: She is not in acute distress.  HENT:      Head: Normocephalic and atraumatic.      Nose: Nose normal.      Mouth/Throat:      Mouth: Mucous membranes are moist.      Pharynx: Oropharynx is clear.   Pulmonary:      Effort: Pulmonary effort is normal.   Abdominal:      General: Abdomen is flat. Bowel sounds are normal. There is no distension.      Palpations: Abdomen is soft. There is no mass.      Tenderness: There is no abdominal tenderness. There is no right CVA tenderness, left CVA tenderness, guarding or rebound.      Hernia: No hernia is present.   Musculoskeletal:         General: Normal range of motion.      Cervical back: Normal range of motion and neck supple.      Right lower leg: No edema.      Left lower leg: No edema.      Comments: B/l foot drop   Skin:     General: Skin is warm and dry.      Capillary Refill: Capillary refill takes less than 2 seconds.   Neurological:      General: No focal deficit present.      Mental Status: She is alert. Mental status is at baseline. She is disoriented.      Gait: Gait abnormal (sitting in w/c watching tv).      Comments: Cognitive impairment noted.   Psychiatric:         Mood and Affect: Mood normal.         Labs:  Most current labs reviewed: no recent labs ordered  Lab Results   Component Value Date    WBC 5.5 08/12/2022    HGB 8.8 (L) 08/12/2022    HCT 30.1 (L) 08/12/2022     (H) 08/12/2022     (H) 08/12/2022    LYMPHOPCT 9.7 08/03/2022    RBC 2.99 (L) 08/12/2022    MCHC 29.2 (L) 08/12/2022    RDW 13.9 08/12/2022     Lab Results   Component Value Date     08/12/2022    K 4.0 08/12/2022     08/12/2022    CO2 25 08/12/2022    BUN 9 08/12/2022    CREATININE 0.33 (L) 08/12/2022    GLUCOSE 110 (H) 08/12/2022    CALCIUM 8.5 (L) 08/12/2022    PROT 6.3 (L) 07/30/2022    BILITOT 0.6 07/30/2022    ALKPHOS 51 07/30/2022    AST 52 (H) 07/30/2022    ALT 14  "07/30/2022     Lab Results   Component Value Date    TSH 23.69 (H) 08/11/2022    TSH 0.71 12/23/2020    TSH 0.47 08/26/2019     Lab Results   Component Value Date    FREET4 0.70 (L) 08/11/2022    FREET4 0.72 (L) 08/11/2022    FREET4 1.46 12/23/2020     Lab Results   Component Value Date    [id number] 670 10/23/2021     Lab Results   Component Value Date    HGBA1C 6.6 (A) 03/26/2022    HGBA1C 6.0 (A) 11/06/2021    HGBA1C 6.2 (A) 09/24/2021     No results found for: \"VITD25\"     Imaging:  Most current imaging studies reviewed: no recent imaging studies ordered    Assessment/Plan   Problem List Items Addressed This Visit             ICD-10-CM    Prediabetes R73.03     -A1c recently has been 6.3%.  DC carb controlled diet         Nausea and vomiting - Primary R11.2    Loose stools R19.5       -Okay to give morning medications at 2 PM today.  However, instructed the nurse to hold off on AEDs secondary to them being close to the evening dose.  -Ordered stat CMP and CBC with differential, and C. Difficile  -Hold senna for 2 days  -Zofran 4 mg 3 times daily as needed for 3 days  -Has repeat BMP, magnesium and CBC with differential on 1/9/2025.  -Vital signs every shift x 3 days with parameters for nursing and when to call provider  Discussed case with: patient,  facility staff (nurse, aid, PT/OT/SLP, SW and/or DON), and Geriatrics care team      Electronically signed by: Nguyen Ring MD      Electronically Signed By: Nguyen Ring MD   1/7/25  6:22 PM  "

## 2025-01-10 ENCOUNTER — NURSING HOME VISIT (OUTPATIENT)
Dept: POST ACUTE CARE | Facility: EXTERNAL LOCATION | Age: 80
End: 2025-01-10
Payer: MEDICARE

## 2025-01-10 VITALS
DIASTOLIC BLOOD PRESSURE: 62 MMHG | HEART RATE: 64 BPM | SYSTOLIC BLOOD PRESSURE: 122 MMHG | RESPIRATION RATE: 18 BRPM | TEMPERATURE: 97 F | OXYGEN SATURATION: 96 %

## 2025-01-10 DIAGNOSIS — R00.1 BRADYCARDIA: ICD-10-CM

## 2025-01-10 DIAGNOSIS — A08.4 VIRAL GASTROENTERITIS: Primary | ICD-10-CM

## 2025-01-10 PROCEDURE — 99307 SBSQ NF CARE SF MDM 10: CPT | Performed by: INTERNAL MEDICINE

## 2025-01-10 NOTE — PROGRESS NOTES
Division: Geriatrics  Date of Service: 1/10/2025  Visit Type: Long-Term Care Acute Follow-up Visit    Chief Complaint: nausea, vomiting and diarrhea; Acute visit    Subjective   History obtained from patient and  facility staff (nurse, aid, PT/OT/SLP, SW and/or DON).     HPI   N/v/d resolved. See below for the labs.   HR 40 (typical for her)- asymptomatic.   Medical Hx reviewed. See below.   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    Cholesterol serum decreased    Encounter for gynecological examination (general) (routine) without abnormal findings 10/17/2016    Encounter for cervical Pap smear with pelvic exam    Encounter for screening for malignant neoplasm of cervix     Encounter for Papanicolaou smear for cervical cancer screening    Encounter for screening for malignant neoplasm of colon 06/23/2015    Encounter for screening colonoscopy    Erythema intertrigo 10/17/2016    Intertrigo    Glycosuria 11/03/2021    Sugar urinary present    Intertrigo 11/03/2023    Intra-abdominal abscess (Multi) 11/03/2023    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    Lichen sclerosus et atrophicus 11/03/2023    Major depressive disorder 10/26/2023    Malignant neoplasm of thyroid gland (Multi) 06/13/2013    Malignant neoplasm of thyroid gland    NPH (normal pressure hydrocephalus) (Multi) 11/03/2023    Osteoarthrosis, localized, primary, knee 11/03/2023    Other conditions influencing health status 07/15/2015    History of cough    Other disorders of pituitary gland     Empty sella syndrome    Other specified cough 03/16/2020    Upper airway cough syndrome    Other specified personal risk factors, not elsewhere classified 10/17/2016     Encounter for gynecologic examination for high-risk patient covered by Medicare    Other symptoms and signs involving the musculoskeletal system 10/12/2021    Weakness of both lower extremities    Pain in right hip 07/12/2021    Right hip pain    Pain in unspecified ankle and joints of unspecified foot 11/24/2014    Ankle pain    Pain in unspecified foot 11/24/2014    Heel pain    Pain in unspecified foot 08/02/2021    Foot pain    Pain in unspecified knee 12/03/2020    Knee pain    Pain in unspecified knee 11/04/2021    Joint pain, knee    Pain, unspecified 04/12/2022    Acute pain    Personal history of other diseases of the female genital tract 02/21/2017    History of breast pain    Personal history of other diseases of the nervous system and sense organs 02/25/2022    History of peripheral neuropathy    Personal history of other diseases of the nervous system and sense organs 11/24/2014    History of conjunctivitis    Personal history of other endocrine, nutritional and metabolic disease 06/29/2018    History of hyperglycemia    Personal history of other endocrine, nutritional and metabolic disease 11/03/2021    History of hyperglycemia    Personal history of other endocrine, nutritional and metabolic disease     History of hyperlipidemia    Personal history of other medical treatment     History of screening mammography    Personal history of other medical treatment 02/05/2020    History of screening mammography    Personal history of other specified conditions 04/24/2018    History of urinary frequency    Personal history of other specified conditions 04/28/2020    History of convulsions    Personal history of other specified conditions 10/12/2021    History of gait disorder    Personal history of urinary (tract) infections 09/05/2019    History of urinary tract infection    Physical deconditioning 10/26/2023    Poor balance 11/03/2023    Prediabetes 09/05/2019    Prediabetes    Prediabetes 10/08/2021     Pre-diabetes    Pulmonary embolism 11/03/2023    Trochanteric bursitis of right hip 11/03/2023    Unspecified convulsions (Multi)     Seizures    Unspecified fracture of left toe(s), initial encounter for closed fracture 08/24/2021    Toe fracture, left    Unspecified injury of unspecified foot, initial encounter 04/12/2021    Toe injury     Past Surgical History:   Procedure Laterality Date    KNEE SURGERY  08/23/2013    Knee Surgery Left    MR HEAD ANGIO WO IV CONTRAST  10/10/2020    MR HEAD ANGIO WO IV CONTRAST 10/10/2020 [facility]    MR NECK ANGIO WO IV CONTRAST  10/10/2020    MR NECK ANGIO WO IV CONTRAST 10/10/2020 [facility]    OTHER SURGICAL HISTORY  02/25/2022    Neck surgery    TONSILLECTOMY  08/23/2013    Tonsillectomy    TOTAL THYROIDECTOMY  10/21/2013    Thyroid Surgery Total Thyroidectomy    TUBAL LIGATION  08/23/2013    Tubal Ligation    US GUIDED PERCUTANEOUS PERITONEAL OR RETROPERITONEAL FLUID COLLECTION DRAINAGE  8/1/2022    US GUIDED PERCUTANEOUS PERITONEAL OR RETROPERITONEAL FLUID COLLECTION DRAINAGE 8/1/2022 [facility]ICAL [facility]GAC[facility]     Family History   Problem Relation Name Age of Onset    Memory loss Mother      Heart disease Father      Hyperlipidemia Father      Depression Daughter      Autism Son      Depression Son      Diabetes Maternal Grandmother       Social History     Tobacco Use    Smoking status: Never     Passive exposure: Never    Smokeless tobacco: Never   Substance Use Topics    Alcohol use: Not Currently       Allergies: Ciprofloxacin, Dilantin [phenytoin sodium extended], Codeine, Naproxen, and Phenytoin    Medications reviewed and reconciled.   Current Outpatient Medications   Medication Sig Dispense Refill    acetaminophen (Tylenol) 325 mg tablet Take 2 tablets (650 mg) by mouth every 4 hours if needed. DO NOT EXCEED 3000 MG IN 24 HOURS      carboxymethylcellulose (Refresh Plus) 0.5 % ophthalmic solution Administer 1 drop into both eyes 4 times a day.       cholecalciferol (Vitamin D-3) 25 MCG (1000 UT) tablet Take 4 tablets (4,000 Units) by mouth once daily.      diclofenac sodium 1 % kit every 6 hours.  apply 4 G to lower ext or 2G to upper ext up to 4x per day prn pain      escitalopram (Lexapro) 5 mg tablet Take 1 tablet (5 mg) by mouth once daily.      eucerin cream Apply topically 2 times a day. To affected areas      hydrocortisone 1 % cream Apply topically 2 times a day as needed. To affected areas      lacosamide (Vimpat) 100 mg tablet Take 1 tablet (100 mg) by mouth every 12 hours. 60 tablet 5    levETIRAcetam (Keppra) 750 mg tablet Take 1 tablet (750 mg) by mouth 2 times a day.      levothyroxine (Synthroid) 88 mcg tablet Take 1 tablet (88 mcg) by mouth once daily.      lisinopril 5 mg tablet Take 1 tablet (5 mg) by mouth once daily.      pantoprazole (ProtoNix) 20 mg EC tablet Take 1 tablet (20 mg) by mouth once daily in the morning. Take before meals. Do not crush, chew, or split.      simvastatin (Zocor) 20 mg tablet Take 1 tablet (20 mg) by mouth once daily in the evening.       No current facility-administered medications for this visit.       Objective   /62   Pulse 64   Temp 36.1 °C (97 °F)   Resp 18   SpO2 96%   Physical Exam  Vitals reviewed.   Constitutional:       General: She is not in acute distress.     Comments: Sitting in bed, eating lunch and watching tv, on room air   HENT:      Head: Normocephalic and atraumatic.      Nose: Nose normal.      Mouth/Throat:      Mouth: Mucous membranes are moist.      Pharynx: Oropharynx is clear.   Pulmonary:      Effort: Pulmonary effort is normal.   Abdominal:      General: Abdomen is flat. Bowel sounds are normal. There is no distension.      Palpations: Abdomen is soft. There is no mass.      Tenderness: There is no abdominal tenderness. There is no right CVA tenderness, left CVA tenderness, guarding or rebound.      Hernia: No hernia is present.   Musculoskeletal:         General: Normal  "range of motion.      Cervical back: Normal range of motion and neck supple.      Right lower leg: No edema.      Left lower leg: No edema.      Comments: B/l foot drop   Skin:     General: Skin is warm and dry.      Capillary Refill: Capillary refill takes less than 2 seconds.   Neurological:      General: No focal deficit present.      Mental Status: She is alert. Mental status is at baseline. She is disoriented.      Gait: Gait abnormal.      Comments: Cognitive impairment noted.   Psychiatric:         Mood and Affect: Mood normal.         Labs:  Most current labs reviewed: see below for the results      Lab Results   Component Value Date    WBC 5.5 08/12/2022    HGB 8.8 (L) 08/12/2022    HCT 30.1 (L) 08/12/2022     (H) 08/12/2022     (H) 08/12/2022    LYMPHOPCT 9.7 08/03/2022    RBC 2.99 (L) 08/12/2022    MCHC 29.2 (L) 08/12/2022    RDW 13.9 08/12/2022     Lab Results   Component Value Date     08/12/2022    K 4.0 08/12/2022     08/12/2022    CO2 25 08/12/2022    BUN 9 08/12/2022    CREATININE 0.33 (L) 08/12/2022    GLUCOSE 110 (H) 08/12/2022    CALCIUM 8.5 (L) 08/12/2022    PROT 6.3 (L) 07/30/2022    BILITOT 0.6 07/30/2022    ALKPHOS 51 07/30/2022    AST 52 (H) 07/30/2022    ALT 14 07/30/2022     Lab Results   Component Value Date    TSH 23.69 (H) 08/11/2022    TSH 0.71 12/23/2020    TSH 0.47 08/26/2019     Lab Results   Component Value Date    FREET4 0.70 (L) 08/11/2022    FREET4 0.72 (L) 08/11/2022    FREET4 1.46 12/23/2020     Lab Results   Component Value Date    [id number] 670 10/23/2021     Lab Results   Component Value Date    HGBA1C 6.6 (A) 03/26/2022    HGBA1C 6.0 (A) 11/06/2021    HGBA1C 6.2 (A) 09/24/2021     No results found for: \"VITD25\"     Imaging:  Most current imaging studies reviewed: no recent imaging studies ordered    Assessment/Plan    Problem List Items Addressed This Visit             ICD-10-CM    Bradycardia R00.1     -Asymptomatic.  Not on any " beta-blocker  -Chronic.  I suspect baseline heart rate when the rate is 40 to 50s.  When asleep around 30s.  -TSH has been normal.   - per PCP, pt refused holter monitor before. Pt refuses again today. Never been symptomatic before.         Viral gastroenteritis - Primary A08.4     - resolved. Several residents also with similar symptoms.             Discussed case with: patient,  facility staff (nurse, aid, PT/OT/SLP, SW and/or DON), and Geriatrics care team      Electronically signed by: Nguyen Ring MD

## 2025-01-10 NOTE — LETTER
Patient: Shikha Soliz  : 1945    Encounter Date: 01/10/2025      Division: Geriatrics  Date of Service: 1/10/2025  Visit Type: Long-Term Care Acute Follow-up Visit    Chief Complaint: nausea, vomiting and diarrhea; Acute visit    Subjective  History obtained from patient and  facility staff (nurse, aid, PT/OT/SLP, SW and/or DON).     HPI   N/v/d resolved. See below for the labs.   HR 40 (typical for her)- asymptomatic.   Medical Hx reviewed. See below.   Past Medical History:   Diagnosis Date   • Abrasion, left lesser toe(s), initial encounter 2021    Abrasion of toe of left foot, initial encounter   • Colon polyps 2023   • Convulsions (Multi) 2023   • Decreased white blood cell count, unspecified 2020    WBC decreased   • Disorder of lipoprotein metabolism, unspecified 2014    Cholesterol serum decreased   • Encounter for gynecological examination (general) (routine) without abnormal findings 10/17/2016    Encounter for cervical Pap smear with pelvic exam   • Encounter for screening for malignant neoplasm of cervix     Encounter for Papanicolaou smear for cervical cancer screening   • Encounter for screening for malignant neoplasm of colon 2015    Encounter for screening colonoscopy   • Erythema intertrigo 10/17/2016    Intertrigo   • Glycosuria 2021    Sugar urinary present   • Intertrigo 2023   • Intra-abdominal abscess (Multi) 2023   • Left knee DJD 2023   • Lichen sclerosus et atrophicus 2023   • Major depressive disorder 10/26/2023   • Malignant neoplasm of thyroid gland (Multi) 2013    Malignant neoplasm of thyroid gland   • NPH (normal pressure hydrocephalus) (Multi) 2023   • Osteoarthrosis, localized, primary, knee 2023   • Other conditions influencing health status 07/15/2015    History of cough   • Other disorders of pituitary gland     Empty sella syndrome   • Other specified cough 2020    Upper airway  cough syndrome   • Other specified personal risk factors, not elsewhere classified 10/17/2016    Encounter for gynecologic examination for high-risk patient covered by Medicare   • Other symptoms and signs involving the musculoskeletal system 10/12/2021    Weakness of both lower extremities   • Pain in right hip 07/12/2021    Right hip pain   • Pain in unspecified ankle and joints of unspecified foot 11/24/2014    Ankle pain   • Pain in unspecified foot 11/24/2014    Heel pain   • Pain in unspecified foot 08/02/2021    Foot pain   • Pain in unspecified knee 12/03/2020    Knee pain   • Pain in unspecified knee 11/04/2021    Joint pain, knee   • Pain, unspecified 04/12/2022    Acute pain   • Personal history of other diseases of the female genital tract 02/21/2017    History of breast pain   • Personal history of other diseases of the nervous system and sense organs 02/25/2022    History of peripheral neuropathy   • Personal history of other diseases of the nervous system and sense organs 11/24/2014    History of conjunctivitis   • Personal history of other endocrine, nutritional and metabolic disease 06/29/2018    History of hyperglycemia   • Personal history of other endocrine, nutritional and metabolic disease 11/03/2021    History of hyperglycemia   • Personal history of other endocrine, nutritional and metabolic disease     History of hyperlipidemia   • Personal history of other medical treatment     History of screening mammography   • Personal history of other medical treatment 02/05/2020    History of screening mammography   • Personal history of other specified conditions 04/24/2018    History of urinary frequency   • Personal history of other specified conditions 04/28/2020    History of convulsions   • Personal history of other specified conditions 10/12/2021    History of gait disorder   • Personal history of urinary (tract) infections 09/05/2019    History of urinary tract infection   • Physical  deconditioning 10/26/2023   • Poor balance 11/03/2023   • Prediabetes 09/05/2019    Prediabetes   • Prediabetes 10/08/2021    Pre-diabetes   • Pulmonary embolism 11/03/2023   • Trochanteric bursitis of right hip 11/03/2023   • Unspecified convulsions (Multi)     Seizures   • Unspecified fracture of left toe(s), initial encounter for closed fracture 08/24/2021    Toe fracture, left   • Unspecified injury of unspecified foot, initial encounter 04/12/2021    Toe injury     Past Surgical History:   Procedure Laterality Date   • KNEE SURGERY  08/23/2013    Knee Surgery Left   • MR HEAD ANGIO WO IV CONTRAST  10/10/2020    MR HEAD ANGIO WO IV CONTRAST 10/10/2020 [facility] ANCILLARY LEGACY   • MR NECK ANGIO WO IV CONTRAST  10/10/2020    MR NECK ANGIO WO IV CONTRAST 10/10/2020 [facility] ANCILLARY LEGACY   • OTHER SURGICAL HISTORY  02/25/2022    Neck surgery   • TONSILLECTOMY  08/23/2013    Tonsillectomy   • TOTAL THYROIDECTOMY  10/21/2013    Thyroid Surgery Total Thyroidectomy   • TUBAL LIGATION  08/23/2013    Tubal Ligation   • US GUIDED PERCUTANEOUS PERITONEAL OR RETROPERITONEAL FLUID COLLECTION DRAINAGE  8/1/2022    US GUIDED PERCUTANEOUS PERITONEAL OR RETROPERITONEAL FLUID COLLECTION DRAINAGE 8/1/2022 [facility] CLINICAL LEGACY     Family History   Problem Relation Name Age of Onset   • Memory loss Mother     • Heart disease Father     • Hyperlipidemia Father     • Depression Daughter     • Autism Son     • Depression Son     • Diabetes Maternal Grandmother       Social History     Tobacco Use   • Smoking status: Never     Passive exposure: Never   • Smokeless tobacco: Never   Substance Use Topics   • Alcohol use: Not Currently       Allergies: Ciprofloxacin, Dilantin [phenytoin sodium extended], Codeine, Naproxen, and Phenytoin    Medications reviewed and reconciled.   Current Outpatient Medications   Medication Sig Dispense Refill   • acetaminophen (Tylenol) 325 mg tablet Take 2 tablets (650 mg) by mouth every 4 hours if needed. DO NOT  EXCEED 3000 MG IN 24 HOURS     • carboxymethylcellulose (Refresh Plus) 0.5 % ophthalmic solution Administer 1 drop into both eyes 4 times a day.     • cholecalciferol (Vitamin D-3) 25 MCG (1000 UT) tablet Take 4 tablets (4,000 Units) by mouth once daily.     • diclofenac sodium 1 % kit every 6 hours.  apply 4 G to lower ext or 2G to upper ext up to 4x per day prn pain     • escitalopram (Lexapro) 5 mg tablet Take 1 tablet (5 mg) by mouth once daily.     • eucerin cream Apply topically 2 times a day. To affected areas     • hydrocortisone 1 % cream Apply topically 2 times a day as needed. To affected areas     • lacosamide (Vimpat) 100 mg tablet Take 1 tablet (100 mg) by mouth every 12 hours. 60 tablet 5   • levETIRAcetam (Keppra) 750 mg tablet Take 1 tablet (750 mg) by mouth 2 times a day.     • levothyroxine (Synthroid) 88 mcg tablet Take 1 tablet (88 mcg) by mouth once daily.     • lisinopril 5 mg tablet Take 1 tablet (5 mg) by mouth once daily.     • pantoprazole (ProtoNix) 20 mg EC tablet Take 1 tablet (20 mg) by mouth once daily in the morning. Take before meals. Do not crush, chew, or split.     • simvastatin (Zocor) 20 mg tablet Take 1 tablet (20 mg) by mouth once daily in the evening.       No current facility-administered medications for this visit.       Objective  /62   Pulse 64   Temp 36.1 °C (97 °F)   Resp 18   SpO2 96%   Physical Exam  Vitals reviewed.   Constitutional:       General: She is not in acute distress.     Comments: Sitting in bed, eating lunch and watching tv, on room air   HENT:      Head: Normocephalic and atraumatic.      Nose: Nose normal.      Mouth/Throat:      Mouth: Mucous membranes are moist.      Pharynx: Oropharynx is clear.   Pulmonary:      Effort: Pulmonary effort is normal.   Abdominal:      General: Abdomen is flat. Bowel sounds are normal. There is no distension.      Palpations: Abdomen is soft. There is no mass.      Tenderness: There is no abdominal  "tenderness. There is no right CVA tenderness, left CVA tenderness, guarding or rebound.      Hernia: No hernia is present.   Musculoskeletal:         General: Normal range of motion.      Cervical back: Normal range of motion and neck supple.      Right lower leg: No edema.      Left lower leg: No edema.      Comments: B/l foot drop   Skin:     General: Skin is warm and dry.      Capillary Refill: Capillary refill takes less than 2 seconds.   Neurological:      General: No focal deficit present.      Mental Status: She is alert. Mental status is at baseline. She is disoriented.      Gait: Gait abnormal.      Comments: Cognitive impairment noted.   Psychiatric:         Mood and Affect: Mood normal.         Labs:  Most current labs reviewed: see below for the results      Lab Results   Component Value Date    WBC 5.5 08/12/2022    HGB 8.8 (L) 08/12/2022    HCT 30.1 (L) 08/12/2022     (H) 08/12/2022     (H) 08/12/2022    LYMPHOPCT 9.7 08/03/2022    RBC 2.99 (L) 08/12/2022    MCHC 29.2 (L) 08/12/2022    RDW 13.9 08/12/2022     Lab Results   Component Value Date     08/12/2022    K 4.0 08/12/2022     08/12/2022    CO2 25 08/12/2022    BUN 9 08/12/2022    CREATININE 0.33 (L) 08/12/2022    GLUCOSE 110 (H) 08/12/2022    CALCIUM 8.5 (L) 08/12/2022    PROT 6.3 (L) 07/30/2022    BILITOT 0.6 07/30/2022    ALKPHOS 51 07/30/2022    AST 52 (H) 07/30/2022    ALT 14 07/30/2022     Lab Results   Component Value Date    TSH 23.69 (H) 08/11/2022    TSH 0.71 12/23/2020    TSH 0.47 08/26/2019     Lab Results   Component Value Date    FREET4 0.70 (L) 08/11/2022    FREET4 0.72 (L) 08/11/2022    FREET4 1.46 12/23/2020     Lab Results   Component Value Date    [id number] 670 10/23/2021     Lab Results   Component Value Date    HGBA1C 6.6 (A) 03/26/2022    HGBA1C 6.0 (A) 11/06/2021    HGBA1C 6.2 (A) 09/24/2021     No results found for: \"VITD25\"     Imaging:  Most current imaging studies reviewed: no recent imaging " studies ordered    Assessment/Plan   Problem List Items Addressed This Visit             ICD-10-CM    Bradycardia R00.1     -Asymptomatic.  Not on any beta-blocker  -Chronic.  I suspect baseline heart rate when the rate is 40 to 50s.  When asleep around 30s.  -TSH has been normal.   - per PCP, pt refused holter monitor before. Pt refuses again today. Never been symptomatic before.         Viral gastroenteritis - Primary A08.4     - resolved. Several residents also with similar symptoms.             Discussed case with: patient,  facility staff (nurse, aid, PT/OT/SLP, SW and/or DON), and Geriatrics care team      Electronically signed by: Nguyen Ring MD      Electronically Signed By: Nguyen Ring MD   1/12/25  5:28 PM

## 2025-01-12 PROBLEM — A08.4 VIRAL GASTROENTERITIS: Status: ACTIVE | Noted: 2025-01-12

## 2025-01-12 PROBLEM — L89.613 PRESSURE ULCER OF RIGHT HEEL, STAGE 3 (MULTI): Status: RESOLVED | Noted: 2024-01-30 | Resolved: 2025-01-12

## 2025-01-12 NOTE — ASSESSMENT & PLAN NOTE
-Asymptomatic.  Not on any beta-blocker  -Chronic.  I suspect baseline heart rate when the rate is 40 to 50s.  When asleep around 30s.  -TSH has been normal.   - per PCP, pt refused holter monitor before. Pt refuses again today. Never been symptomatic before.

## 2025-01-16 ENCOUNTER — DOCUMENTATION (OUTPATIENT)
Dept: POST ACUTE CARE | Facility: EXTERNAL LOCATION | Age: 80
End: 2025-01-16
Payer: MEDICARE

## 2025-01-16 NOTE — PROGRESS NOTES
Late entry. EKG fr 1/14/2025 reading showed: Sinus bradycardia, HR 33, RBBB, Qtc 424ms. Of note, this was ordered due to reading by nurse of HR 39 in the afternoon. Pt asymptomatic. Per discussion with IDT and PCP, this is a chronic condition for pt., she has always been asymptomatic and she refused Holter monitor/work up before.

## 2025-01-28 ENCOUNTER — NURSING HOME VISIT (OUTPATIENT)
Dept: POST ACUTE CARE | Facility: EXTERNAL LOCATION | Age: 80
End: 2025-01-28
Payer: MEDICARE

## 2025-01-28 VITALS
HEART RATE: 77 BPM | SYSTOLIC BLOOD PRESSURE: 148 MMHG | BODY MASS INDEX: 34.57 KG/M2 | DIASTOLIC BLOOD PRESSURE: 78 MMHG | OXYGEN SATURATION: 98 % | RESPIRATION RATE: 18 BRPM | TEMPERATURE: 97.2 F | WEIGHT: 201.4 LBS

## 2025-01-28 DIAGNOSIS — E03.9 HYPOTHYROIDISM, UNSPECIFIED TYPE: ICD-10-CM

## 2025-01-28 DIAGNOSIS — R73.03 PREDIABETES: ICD-10-CM

## 2025-01-28 DIAGNOSIS — I10 PRIMARY HYPERTENSION: ICD-10-CM

## 2025-01-28 DIAGNOSIS — E78.2 MIXED HYPERLIPIDEMIA: ICD-10-CM

## 2025-01-28 DIAGNOSIS — R53.81 DEBILITY: Primary | ICD-10-CM

## 2025-01-28 DIAGNOSIS — K26.9 DUODENAL ULCER: ICD-10-CM

## 2025-01-28 DIAGNOSIS — R00.1 BRADYCARDIA: ICD-10-CM

## 2025-01-28 DIAGNOSIS — Z71.89 ADVANCE CARE PLANNING: ICD-10-CM

## 2025-01-28 PROCEDURE — 99309 SBSQ NF CARE MODERATE MDM 30: CPT | Performed by: INTERNAL MEDICINE

## 2025-01-28 NOTE — PROGRESS NOTES
Division: Geriatrics  Date of Service: 1/28/2025  Visit Type: Long-Term Care Regulatory Follow-up Visit      Chief Complaint: Bradycardia; 60 day visit    Subjective   History obtained from patient,  facility staff (nurse, aid, PT/OT/SLP, JOSE and/or DON), medical records, and Geriatrics care team. Collateral history obtained due to pt's cognitive deficits    HPI Hospitalization/ER visits:none  Memory:no reported significant change  Mood changes: no change  Sleep:no issues  Falls:none  Med Changes/OTC meds:none  Pain:no issues  Bmno issues  Appetite:eats % meals  Weight:gained about 16lbs in the last year.    No interval seizures.     Medical Hx reviewed. See below.   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    Cholesterol serum decreased    Encounter for gynecological examination (general) (routine) without abnormal findings 10/17/2016    Encounter for cervical Pap smear with pelvic exam    Encounter for screening for malignant neoplasm of cervix     Encounter for Papanicolaou smear for cervical cancer screening    Encounter for screening for malignant neoplasm of colon 06/23/2015    Encounter for screening colonoscopy    Erythema intertrigo 10/17/2016    Intertrigo    Glycosuria 11/03/2021    Sugar urinary present    Intertrigo 11/03/2023    Intra-abdominal abscess (Multi) 11/03/2023    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    Lichen sclerosus et atrophicus 11/03/2023    Major depressive disorder 10/26/2023    Malignant neoplasm of thyroid gland (Multi) 06/13/2013    Malignant neoplasm of thyroid gland    NPH (normal pressure hydrocephalus) (Multi) 11/03/2023    Osteoarthrosis, localized, primary, knee 11/03/2023     Other conditions influencing health status 07/15/2015    History of cough    Other disorders of pituitary gland     Empty sella syndrome    Other specified cough 03/16/2020    Upper airway cough syndrome    Other specified personal risk factors, not elsewhere classified 10/17/2016    Encounter for gynecologic examination for high-risk patient covered by Medicare    Other symptoms and signs involving the musculoskeletal system 10/12/2021    Weakness of both lower extremities    Pain in right hip 07/12/2021    Right hip pain    Pain in unspecified ankle and joints of unspecified foot 11/24/2014    Ankle pain    Pain in unspecified foot 11/24/2014    Heel pain    Pain in unspecified foot 08/02/2021    Foot pain    Pain in unspecified knee 12/03/2020    Knee pain    Pain in unspecified knee 11/04/2021    Joint pain, knee    Pain, unspecified 04/12/2022    Acute pain    Personal history of other diseases of the female genital tract 02/21/2017    History of breast pain    Personal history of other diseases of the nervous system and sense organs 02/25/2022    History of peripheral neuropathy    Personal history of other diseases of the nervous system and sense organs 11/24/2014    History of conjunctivitis    Personal history of other endocrine, nutritional and metabolic disease 06/29/2018    History of hyperglycemia    Personal history of other endocrine, nutritional and metabolic disease 11/03/2021    History of hyperglycemia    Personal history of other endocrine, nutritional and metabolic disease     History of hyperlipidemia    Personal history of other medical treatment     History of screening mammography    Personal history of other medical treatment 02/05/2020    History of screening mammography    Personal history of other specified conditions 04/24/2018    History of urinary frequency    Personal history of other specified conditions 04/28/2020    History of convulsions    Personal history of other specified  conditions 10/12/2021    History of gait disorder    Personal history of urinary (tract) infections 09/05/2019    History of urinary tract infection    Physical deconditioning 10/26/2023    Poor balance 11/03/2023    Prediabetes 09/05/2019    Prediabetes    Prediabetes 10/08/2021    Pre-diabetes    Pulmonary embolism 11/03/2023    Trochanteric bursitis of right hip 11/03/2023    Unspecified convulsions (Multi)     Seizures    Unspecified fracture of left toe(s), initial encounter for closed fracture 08/24/2021    Toe fracture, left    Unspecified injury of unspecified foot, initial encounter 04/12/2021    Toe injury     Past Surgical History:   Procedure Laterality Date    KNEE SURGERY  08/23/2013    Knee Surgery Left    MR HEAD ANGIO WO IV CONTRAST  10/10/2020    MR HEAD ANGIO WO IV CONTRAST 10/10/2020 [facility] ANCILLARY LEGACY    MR NECK ANGIO WO IV CONTRAST  10/10/2020    MR NECK ANGIO WO IV CONTRAST 10/10/2020 [facility] ANCILLARY LEGACY    OTHER SURGICAL HISTORY  02/25/2022    Neck surgery    TONSILLECTOMY  08/23/2013    Tonsillectomy    TOTAL THYROIDECTOMY  10/21/2013    Thyroid Surgery Total Thyroidectomy    TUBAL LIGATION  08/23/2013    Tubal Ligation    US GUIDED PERCUTANEOUS PERITONEAL OR RETROPERITONEAL FLUID COLLECTION DRAINAGE  8/1/2022    US GUIDED PERCUTANEOUS PERITONEAL OR RETROPERITONEAL FLUID COLLECTION DRAINAGE 8/1/2022 [facility] CLINICAL LEGACY     Family History   Problem Relation Name Age of Onset    Memory loss Mother      Heart disease Father      Hyperlipidemia Father      Depression Daughter      Autism Son      Depression Son      Diabetes Maternal Grandmother       Social History     Tobacco Use    Smoking status: Never     Passive exposure: Never    Smokeless tobacco: Never   Substance Use Topics    Alcohol use: Not Currently       Allergies: Ciprofloxacin, Dilantin [phenytoin sodium extended], Codeine, Naproxen, and Phenytoin    Medications reviewed and reconciled.   Current Outpatient Medications    Medication Sig Dispense Refill    acetaminophen (Tylenol) 325 mg tablet Take 2 tablets (650 mg) by mouth every 4 hours if needed. DO NOT EXCEED 3000 MG IN 24 HOURS      carboxymethylcellulose (Refresh Plus) 0.5 % ophthalmic solution Administer 1 drop into both eyes 4 times a day.      cholecalciferol (Vitamin D-3) 25 MCG (1000 UT) tablet Take 4 tablets (4,000 Units) by mouth once daily.      diclofenac sodium 1 % kit every 6 hours.  apply 4 G to lower ext or 2G to upper ext up to 4x per day prn pain      escitalopram (Lexapro) 5 mg tablet Take 1 tablet (5 mg) by mouth once daily.      eucerin cream Apply topically 2 times a day. To affected areas      hydrocortisone 1 % cream Apply topically 2 times a day as needed. To affected areas      lacosamide (Vimpat) 100 mg tablet Take 1 tablet (100 mg) by mouth every 12 hours. 60 tablet 5    levETIRAcetam (Keppra) 750 mg tablet Take 1 tablet (750 mg) by mouth 2 times a day.      levothyroxine (Synthroid) 88 mcg tablet Take 1 tablet (88 mcg) by mouth once daily.      lisinopril 5 mg tablet Take 1 tablet (5 mg) by mouth once daily.      pantoprazole (ProtoNix) 20 mg EC tablet Take 1 tablet (20 mg) by mouth once daily in the morning. Take before meals. Do not crush, chew, or split.      simvastatin (Zocor) 20 mg tablet Take 1 tablet (20 mg) by mouth once daily in the evening.       No current facility-administered medications for this visit.       Objective   /78   Pulse 77   Temp 36.2 °C (97.2 °F)   Resp 18   Wt 91.4 kg (201 lb 6.4 oz)   SpO2 98% Comment: RA  BMI 34.57 kg/m²   Physical Exam  Vitals reviewed.   Constitutional:       General: She is not in acute distress.     Comments: Sitting in bed, eating lunch and watching tv, on room air   HENT:      Head: Normocephalic and atraumatic.      Nose: Nose normal.      Mouth/Throat:      Mouth: Mucous membranes are moist.      Pharynx: Oropharynx is clear.   Pulmonary:      Effort: Pulmonary effort is normal.  "  Abdominal:      General: Abdomen is flat. Bowel sounds are normal.      Palpations: Abdomen is soft.   Musculoskeletal:         General: Normal range of motion.      Cervical back: Normal range of motion and neck supple.      Right lower leg: No edema.      Left lower leg: No edema.      Comments: B/l foot drop   Skin:     General: Skin is warm and dry.      Capillary Refill: Capillary refill takes less than 2 seconds.   Neurological:      General: No focal deficit present.      Mental Status: She is alert. Mental status is at baseline. She is disoriented.      Gait: Gait abnormal.      Comments: Cognitive impairment noted.   Psychiatric:         Mood and Affect: Mood normal.         Labs:  Most current labs reviewed: see below for the results  1/9/2025:     12/4/2024 12/6/2024      Lab Results   Component Value Date    WBC 5.5 08/12/2022    HGB 8.8 (L) 08/12/2022    HCT 30.1 (L) 08/12/2022     (H) 08/12/2022     (H) 08/12/2022    LYMPHOPCT 9.7 08/03/2022    RBC 2.99 (L) 08/12/2022    MCHC 29.2 (L) 08/12/2022    RDW 13.9 08/12/2022     Lab Results   Component Value Date     08/12/2022    K 4.0 08/12/2022     08/12/2022    CO2 25 08/12/2022    BUN 9 08/12/2022    CREATININE 0.33 (L) 08/12/2022    GLUCOSE 110 (H) 08/12/2022    CALCIUM 8.5 (L) 08/12/2022    PROT 6.3 (L) 07/30/2022    BILITOT 0.6 07/30/2022    ALKPHOS 51 07/30/2022    AST 52 (H) 07/30/2022    ALT 14 07/30/2022     Lab Results   Component Value Date    TSH 23.69 (H) 08/11/2022    TSH 0.71 12/23/2020    TSH 0.47 08/26/2019     Lab Results   Component Value Date    FREET4 0.70 (L) 08/11/2022    FREET4 0.72 (L) 08/11/2022    FREET4 1.46 12/23/2020     Lab Results   Component Value Date    [id number] 670 10/23/2021     Lab Results   Component Value Date    HGBA1C 6.6 (A) 03/26/2022    HGBA1C 6.0 (A) 11/06/2021    HGBA1C 6.2 (A) 09/24/2021     No results found for: \"VITD25\"     Imaging:  Most current imaging studies " reviewed: no recent imaging studies ordered    Assessment/Plan    Problem List Items Addressed This Visit             ICD-10-CM    Hypothyroidism E03.9     -Continue levothyroxine 88 mcg daily.           Hyperlipidemia E78.5     Contninue zocor 20mg nightly         Debility - Primary R53.81     - LTC appropriate         Prediabetes R73.03     -A1c recently has been 6.3%. unlikely going to convert to DM. Off carb control diet         Bradycardia R00.1     -Asymptomatic.  Not on any beta-blocker  -Chronic.  I suspect baseline heart rate when the rate is 40 to 50s.  When asleep around 30s.  -TSH has been normal.   - per previous care team, pt refused work up for this.   - EKG didn't show any concerning arrhythmia         Primary hypertension I10     -Controlled.  Continue lisinopril 5 mg daily         Duodenal ulcer K26.9     - per hospitalization records, had hx of possible perforated duodenal ulcer in 2022. Continue pantoprazole 20mg daily indefinitely         Advance care planning Z71.89     - DNR-CCA per previous conversation w another provider            Discussed case with: patient,  facility staff (nurse, aid, PT/OT/SLP, SW and/or DON), and Geriatrics care team      Electronically signed by: Nguyen Ring MD

## 2025-01-28 NOTE — LETTER
Patient: Shikha Soliz  : 1945    Encounter Date: 2025      Division: Geriatrics  Date of Service: 2025  Visit Type: Long-Term Care Regulatory Follow-up Visit      Chief Complaint: Bradycardia; 60 day visit    Subjective  History obtained from patient,  facility staff (nurse, aid, PT/OT/SLP, SW and/or DON), medical records, and Geriatrics care team. Collateral history obtained due to pt's cognitive deficits    HPI Hospitalization/ER visits:none  Memory:no reported significant change  Mood changes: no change  Sleep:no issues  Falls:none  Med Changes/OTC meds:none  Pain:no issues  Bmno issues  Appetite:eats % meals  Weight:gained about 16lbs in the last year.    No interval seizures.     Medical Hx reviewed. See below.   Past Medical History:   Diagnosis Date   • Abrasion, left lesser toe(s), initial encounter 2021    Abrasion of toe of left foot, initial encounter   • Colon polyps 2023   • Convulsions (Multi) 2023   • Decreased white blood cell count, unspecified 2020    WBC decreased   • Disorder of lipoprotein metabolism, unspecified 2014    Cholesterol serum decreased   • Encounter for gynecological examination (general) (routine) without abnormal findings 10/17/2016    Encounter for cervical Pap smear with pelvic exam   • Encounter for screening for malignant neoplasm of cervix     Encounter for Papanicolaou smear for cervical cancer screening   • Encounter for screening for malignant neoplasm of colon 2015    Encounter for screening colonoscopy   • Erythema intertrigo 10/17/2016    Intertrigo   • Glycosuria 2021    Sugar urinary present   • Intertrigo 2023   • Intra-abdominal abscess (Multi) 2023   • Left knee DJD 2023   • Lichen sclerosus et atrophicus 2023   • Major depressive disorder 10/26/2023   • Malignant neoplasm of thyroid gland (Multi) 2013    Malignant neoplasm of thyroid gland   • NPH (normal pressure  hydrocephalus) (Multi) 11/03/2023   • Osteoarthrosis, localized, primary, knee 11/03/2023   • Other conditions influencing health status 07/15/2015    History of cough   • Other disorders of pituitary gland     Empty sella syndrome   • Other specified cough 03/16/2020    Upper airway cough syndrome   • Other specified personal risk factors, not elsewhere classified 10/17/2016    Encounter for gynecologic examination for high-risk patient covered by Medicare   • Other symptoms and signs involving the musculoskeletal system 10/12/2021    Weakness of both lower extremities   • Pain in right hip 07/12/2021    Right hip pain   • Pain in unspecified ankle and joints of unspecified foot 11/24/2014    Ankle pain   • Pain in unspecified foot 11/24/2014    Heel pain   • Pain in unspecified foot 08/02/2021    Foot pain   • Pain in unspecified knee 12/03/2020    Knee pain   • Pain in unspecified knee 11/04/2021    Joint pain, knee   • Pain, unspecified 04/12/2022    Acute pain   • Personal history of other diseases of the female genital tract 02/21/2017    History of breast pain   • Personal history of other diseases of the nervous system and sense organs 02/25/2022    History of peripheral neuropathy   • Personal history of other diseases of the nervous system and sense organs 11/24/2014    History of conjunctivitis   • Personal history of other endocrine, nutritional and metabolic disease 06/29/2018    History of hyperglycemia   • Personal history of other endocrine, nutritional and metabolic disease 11/03/2021    History of hyperglycemia   • Personal history of other endocrine, nutritional and metabolic disease     History of hyperlipidemia   • Personal history of other medical treatment     History of screening mammography   • Personal history of other medical treatment 02/05/2020    History of screening mammography   • Personal history of other specified conditions 04/24/2018    History of urinary frequency   • Personal  history of other specified conditions 04/28/2020    History of convulsions   • Personal history of other specified conditions 10/12/2021    History of gait disorder   • Personal history of urinary (tract) infections 09/05/2019    History of urinary tract infection   • Physical deconditioning 10/26/2023   • Poor balance 11/03/2023   • Prediabetes 09/05/2019    Prediabetes   • Prediabetes 10/08/2021    Pre-diabetes   • Pulmonary embolism 11/03/2023   • Trochanteric bursitis of right hip 11/03/2023   • Unspecified convulsions (Multi)     Seizures   • Unspecified fracture of left toe(s), initial encounter for closed fracture 08/24/2021    Toe fracture, left   • Unspecified injury of unspecified foot, initial encounter 04/12/2021    Toe injury     Past Surgical History:   Procedure Laterality Date   • KNEE SURGERY  08/23/2013    Knee Surgery Left   • MR HEAD ANGIO WO IV CONTRAST  10/10/2020    MR HEAD ANGIO WO IV CONTRAST 10/10/2020 [facility] [place] ANCILLARY LEGACY   • MR NECK ANGIO WO IV CONTRAST  10/10/2020    MR NECK ANGIO WO IV CONTRAST 10/10/2020 [facility] ANCILLARY LEGACY   • OTHER SURGICAL HISTORY  02/25/2022    Neck surgery   • TONSILLECTOMY  08/23/2013    Tonsillectomy   • TOTAL THYROIDECTOMY  10/21/2013    Thyroid Surgery Total Thyroidectomy   • TUBAL LIGATION  08/23/2013    Tubal Ligation   • US GUIDED PERCUTANEOUS PERITONEAL OR RETROPERITONEAL FLUID COLLECTION DRAINAGE  8/1/2022    US GUIDED PERCUTANEOUS PERITONEAL OR RETROPERITONEAL FLUID COLLECTION DRAINAGE 8/1/2022 [facility] CLINICAL LEGACY     Family History   Problem Relation Name Age of Onset   • Memory loss Mother     • Heart disease Father     • Hyperlipidemia Father     • Depression Daughter     • Autism Son     • Depression Son     • Diabetes Maternal Grandmother       Social History     Tobacco Use   • Smoking status: Never     Passive exposure: Never   • Smokeless tobacco: Never   Substance Use Topics   • Alcohol use: Not Currently       Allergies: Ciprofloxacin,  Dilantin [phenytoin sodium extended], Codeine, Naproxen, and Phenytoin    Medications reviewed and reconciled.   Current Outpatient Medications   Medication Sig Dispense Refill   • acetaminophen (Tylenol) 325 mg tablet Take 2 tablets (650 mg) by mouth every 4 hours if needed. DO NOT EXCEED 3000 MG IN 24 HOURS     • carboxymethylcellulose (Refresh Plus) 0.5 % ophthalmic solution Administer 1 drop into both eyes 4 times a day.     • cholecalciferol (Vitamin D-3) 25 MCG (1000 UT) tablet Take 4 tablets (4,000 Units) by mouth once daily.     • diclofenac sodium 1 % kit every 6 hours.  apply 4 G to lower ext or 2G to upper ext up to 4x per day prn pain     • escitalopram (Lexapro) 5 mg tablet Take 1 tablet (5 mg) by mouth once daily.     • eucerin cream Apply topically 2 times a day. To affected areas     • hydrocortisone 1 % cream Apply topically 2 times a day as needed. To affected areas     • lacosamide (Vimpat) 100 mg tablet Take 1 tablet (100 mg) by mouth every 12 hours. 60 tablet 5   • levETIRAcetam (Keppra) 750 mg tablet Take 1 tablet (750 mg) by mouth 2 times a day.     • levothyroxine (Synthroid) 88 mcg tablet Take 1 tablet (88 mcg) by mouth once daily.     • lisinopril 5 mg tablet Take 1 tablet (5 mg) by mouth once daily.     • pantoprazole (ProtoNix) 20 mg EC tablet Take 1 tablet (20 mg) by mouth once daily in the morning. Take before meals. Do not crush, chew, or split.     • simvastatin (Zocor) 20 mg tablet Take 1 tablet (20 mg) by mouth once daily in the evening.       No current facility-administered medications for this visit.       Objective  /78   Pulse 77   Temp 36.2 °C (97.2 °F)   Resp 18   Wt 91.4 kg (201 lb 6.4 oz)   SpO2 98% Comment: RA  BMI 34.57 kg/m²   Physical Exam  Vitals reviewed.   Constitutional:       General: She is not in acute distress.     Comments: Sitting in bed, eating lunch and watching tv, on room air   HENT:      Head: Normocephalic and atraumatic.      Nose: Nose  normal.      Mouth/Throat:      Mouth: Mucous membranes are moist.      Pharynx: Oropharynx is clear.   Pulmonary:      Effort: Pulmonary effort is normal.   Abdominal:      General: Abdomen is flat. Bowel sounds are normal.      Palpations: Abdomen is soft.   Musculoskeletal:         General: Normal range of motion.      Cervical back: Normal range of motion and neck supple.      Right lower leg: No edema.      Left lower leg: No edema.      Comments: B/l foot drop   Skin:     General: Skin is warm and dry.      Capillary Refill: Capillary refill takes less than 2 seconds.   Neurological:      General: No focal deficit present.      Mental Status: She is alert. Mental status is at baseline. She is disoriented.      Gait: Gait abnormal.      Comments: Cognitive impairment noted.   Psychiatric:         Mood and Affect: Mood normal.         Labs:  Most current labs reviewed: see below for the results  1/9/2025:     12/4/2024 12/6/2024      Lab Results   Component Value Date    WBC 5.5 08/12/2022    HGB 8.8 (L) 08/12/2022    HCT 30.1 (L) 08/12/2022     (H) 08/12/2022     (H) 08/12/2022    LYMPHOPCT 9.7 08/03/2022    RBC 2.99 (L) 08/12/2022    MCHC 29.2 (L) 08/12/2022    RDW 13.9 08/12/2022     Lab Results   Component Value Date     08/12/2022    K 4.0 08/12/2022     08/12/2022    CO2 25 08/12/2022    BUN 9 08/12/2022    CREATININE 0.33 (L) 08/12/2022    GLUCOSE 110 (H) 08/12/2022    CALCIUM 8.5 (L) 08/12/2022    PROT 6.3 (L) 07/30/2022    BILITOT 0.6 07/30/2022    ALKPHOS 51 07/30/2022    AST 52 (H) 07/30/2022    ALT 14 07/30/2022     Lab Results   Component Value Date    TSH 23.69 (H) 08/11/2022    TSH 0.71 12/23/2020    TSH 0.47 08/26/2019     Lab Results   Component Value Date    FREET4 0.70 (L) 08/11/2022    FREET4 0.72 (L) 08/11/2022    FREET4 1.46 12/23/2020     Lab Results   Component Value Date    [id number] 670 10/23/2021     Lab Results   Component Value Date    HGBA1C 6.6 (A)  "03/26/2022    HGBA1C 6.0 (A) 11/06/2021    HGBA1C 6.2 (A) 09/24/2021     No results found for: \"VITD25\"     Imaging:  Most current imaging studies reviewed: no recent imaging studies ordered    Assessment/Plan   Problem List Items Addressed This Visit             ICD-10-CM    Hypothyroidism E03.9     -Continue levothyroxine 88 mcg daily.           Hyperlipidemia E78.5     Contninue zocor 20mg nightly         Debility - Primary R53.81     - LTC appropriate         Prediabetes R73.03     -A1c recently has been 6.3%. unlikely going to convert to DM. Off carb control diet         Bradycardia R00.1     -Asymptomatic.  Not on any beta-blocker  -Chronic.  I suspect baseline heart rate when the rate is 40 to 50s.  When asleep around 30s.  -TSH has been normal.   - per previous care team, pt refused work up for this.   - EKG didn't show any concerning arrhythmia         Primary hypertension I10     -Controlled.  Continue lisinopril 5 mg daily         Duodenal ulcer K26.9     - per hospitalization records, had hx of possible perforated duodenal ulcer in 2022. Continue pantoprazole 20mg daily indefinitely         Advance care planning Z71.89     - DNR-CCA per previous conversation w another provider            Discussed case with: patient,  facility staff (nurse, aid, PT/OT/SLP, SW and/or DON), and Geriatrics care team      Electronically signed by: Nguyen Ring MD      Electronically Signed By: Nguyen Ring MD   1/30/25  6:55 PM  "

## 2025-01-30 PROBLEM — Z71.89 ADVANCE CARE PLANNING: Status: ACTIVE | Noted: 2025-01-30

## 2025-01-30 PROBLEM — K26.9 DUODENAL ULCER: Status: ACTIVE | Noted: 2025-01-30

## 2025-01-30 NOTE — ASSESSMENT & PLAN NOTE
- per hospitalization records, had hx of possible perforated duodenal ulcer in 2022. Continue pantoprazole 20mg daily indefinitely

## 2025-01-30 NOTE — ASSESSMENT & PLAN NOTE
-Asymptomatic.  Not on any beta-blocker  -Chronic.  I suspect baseline heart rate when the rate is 40 to 50s.  When asleep around 30s.  -TSH has been normal.   - per previous care team, pt refused work up for this.   - EKG didn't show any concerning arrhythmia

## 2025-02-14 ENCOUNTER — NURSING HOME VISIT (OUTPATIENT)
Dept: POST ACUTE CARE | Facility: EXTERNAL LOCATION | Age: 80
End: 2025-02-14
Payer: MEDICARE

## 2025-02-14 DIAGNOSIS — Z86.711 HISTORY OF PULMONARY EMBOLUS (PE): ICD-10-CM

## 2025-02-14 DIAGNOSIS — R60.0 BILATERAL LEG EDEMA: Primary | ICD-10-CM

## 2025-02-14 PROCEDURE — G2211 COMPLEX E/M VISIT ADD ON: HCPCS | Performed by: INTERNAL MEDICINE

## 2025-02-14 PROCEDURE — 99308 SBSQ NF CARE LOW MDM 20: CPT | Performed by: INTERNAL MEDICINE

## 2025-02-14 NOTE — LETTER
Patient: Shikha Soliz  : 1945    Encounter Date: 2025      Division: Geriatrics  Date of Service: 2025  Visit Type: Long-Term Care Acute Follow-up Visit      Chief Complaint: edema of lower extremities; Acute visit    Subjective  History obtained from patient and  facility staff (nurse, aid, PT/OT/SLP, SW and/or DON).     HPI   Pt complains of worsening edema in the past week. Currently, has difficulty moving legs 2/2 edema. She states she has hx of LE edema, intermittent. Denies SOB. Does not have increasing O2 requirements.    Medical Hx reviewed. See below.   Past Medical History:   Diagnosis Date   • Abrasion, left lesser toe(s), initial encounter 2021    Abrasion of toe of left foot, initial encounter   • Colon polyps 2023   • Convulsions (Multi) 2023   • Decreased white blood cell count, unspecified 2020    WBC decreased   • Disorder of lipoprotein metabolism, unspecified 2014    Cholesterol serum decreased   • Encounter for gynecological examination (general) (routine) without abnormal findings 10/17/2016    Encounter for cervical Pap smear with pelvic exam   • Encounter for screening for malignant neoplasm of cervix     Encounter for Papanicolaou smear for cervical cancer screening   • Encounter for screening for malignant neoplasm of colon 2015    Encounter for screening colonoscopy   • Erythema intertrigo 10/17/2016    Intertrigo   • Glycosuria 2021    Sugar urinary present   • Intertrigo 2023   • Intra-abdominal abscess (Multi) 2023   • Left knee DJD 2023   • Lichen sclerosus et atrophicus 2023   • Major depressive disorder 10/26/2023   • Malignant neoplasm of thyroid gland (Multi) 2013    Malignant neoplasm of thyroid gland   • NPH (normal pressure hydrocephalus) (Multi) 2023   • Osteoarthrosis, localized, primary, knee 2023   • Other conditions influencing health status 07/15/2015    History of cough    • Other disorders of pituitary gland     Empty sella syndrome   • Other specified cough 03/16/2020    Upper airway cough syndrome   • Other specified personal risk factors, not elsewhere classified 10/17/2016    Encounter for gynecologic examination for high-risk patient covered by Medicare   • Other symptoms and signs involving the musculoskeletal system 10/12/2021    Weakness of both lower extremities   • Pain in right hip 07/12/2021    Right hip pain   • Pain in unspecified ankle and joints of unspecified foot 11/24/2014    Ankle pain   • Pain in unspecified foot 11/24/2014    Heel pain   • Pain in unspecified foot 08/02/2021    Foot pain   • Pain in unspecified knee 12/03/2020    Knee pain   • Pain in unspecified knee 11/04/2021    Joint pain, knee   • Pain, unspecified 04/12/2022    Acute pain   • Personal history of other diseases of the female genital tract 02/21/2017    History of breast pain   • Personal history of other diseases of the nervous system and sense organs 02/25/2022    History of peripheral neuropathy   • Personal history of other diseases of the nervous system and sense organs 11/24/2014    History of conjunctivitis   • Personal history of other endocrine, nutritional and metabolic disease 06/29/2018    History of hyperglycemia   • Personal history of other endocrine, nutritional and metabolic disease 11/03/2021    History of hyperglycemia   • Personal history of other endocrine, nutritional and metabolic disease     History of hyperlipidemia   • Personal history of other medical treatment     History of screening mammography   • Personal history of other medical treatment 02/05/2020    History of screening mammography   • Personal history of other specified conditions 04/24/2018    History of urinary frequency   • Personal history of other specified conditions 04/28/2020    History of convulsions   • Personal history of other specified conditions 10/12/2021    History of gait disorder   •  Personal history of urinary (tract) infections 09/05/2019    History of urinary tract infection   • Physical deconditioning 10/26/2023   • Poor balance 11/03/2023   • Prediabetes 09/05/2019    Prediabetes   • Prediabetes 10/08/2021    Pre-diabetes   • Pulmonary embolism 11/03/2023   • Trochanteric bursitis of right hip 11/03/2023   • Unspecified convulsions (Multi)     Seizures   • Unspecified fracture of left toe(s), initial encounter for closed fracture 08/24/2021    Toe fracture, left   • Unspecified injury of unspecified foot, initial encounter 04/12/2021    Toe injury     Past Surgical History:   Procedure Laterality Date   • KNEE SURGERY  08/23/2013    Knee Surgery Left   • MR HEAD ANGIO WO IV CONTRAST  10/10/2020    MR HEAD ANGIO WO IV CONTRAST 10/10/2020 [facility] [place] [facility]LL[facility] LEGAC[facility]   • MR NECK ANGIO WO IV CONTRAST  10/10/2020    MR NECK ANGIO WO IV CONTRAST 10/10/2020 [facility] [place] [facility]CILL[facility] LEGACY   • OTHER SURGICAL HISTORY  02/25/2022    Neck surgery   • TONSILLECTOMY  08/23/2013    Tonsillectomy   • TOTAL THYROIDECTOMY  10/21/2013    Thyroid Surgery Total Thyroidectomy   • TUBAL LIGATION  08/23/2013    Tubal Ligation   • US GUIDED PERCUTANEOUS PERITONEAL OR RETROPERITONEAL FLUID COLLECTION DRAINAGE  8/1/2022    US GUIDED PERCUTANEOUS PERITONEAL OR RETROPERITONEAL FLUID COLLECTION DRAINAGE 8/1/2022 [facility] CLINICAL LEGACY     Family History   Problem Relation Name Age of Onset   • Memory loss Mother     • Heart disease Father     • Hyperlipidemia Father     • Depression Daughter     • Autism Son     • Depression Son     • Diabetes Maternal Grandmother       Social History     Tobacco Use   • Smoking status: Never     Passive exposure: Never   • Smokeless tobacco: Never   Substance Use Topics   • Alcohol use: Not Currently       Allergies: Ciprofloxacin, Dilantin [phenytoin sodium extended], Codeine, Naproxen, and Phenytoin    Medications reviewed and reconciled.   Current Outpatient Medications   Medication Sig Dispense  Refill   • acetaminophen (Tylenol) 325 mg tablet Take 2 tablets (650 mg) by mouth every 4 hours if needed. DO NOT EXCEED 3000 MG IN 24 HOURS     • carboxymethylcellulose (Refresh Plus) 0.5 % ophthalmic solution Administer 1 drop into both eyes 4 times a day.     • cholecalciferol (Vitamin D-3) 25 MCG (1000 UT) tablet Take 4 tablets (4,000 Units) by mouth once daily.     • diclofenac sodium 1 % kit every 6 hours.  apply 4 G to lower ext or 2G to upper ext up to 4x per day prn pain     • escitalopram (Lexapro) 5 mg tablet Take 1 tablet (5 mg) by mouth once daily.     • eucerin cream Apply topically 2 times a day. To affected areas     • hydrocortisone 1 % cream Apply topically 2 times a day as needed. To affected areas     • lacosamide (Vimpat) 100 mg tablet Take 1 tablet (100 mg) by mouth every 12 hours. 60 tablet 5   • levETIRAcetam (Keppra) 750 mg tablet Take 1 tablet (750 mg) by mouth 2 times a day.     • levothyroxine (Synthroid) 88 mcg tablet Take 1 tablet (88 mcg) by mouth once daily.     • lisinopril 5 mg tablet Take 1 tablet (5 mg) by mouth once daily.     • pantoprazole (ProtoNix) 20 mg EC tablet Take 1 tablet (20 mg) by mouth once daily in the morning. Take before meals. Do not crush, chew, or split.     • simvastatin (Zocor) 20 mg tablet Take 1 tablet (20 mg) by mouth once daily in the evening.       No current facility-administered medications for this visit.       Objective  /72   Temp 36.4 °C (97.6 °F)   Resp 18   Wt 91.3 kg (201 lb 3.2 oz)   SpO2 98%   BMI 34.54 kg/m²   Physical Exam    Labs:  Most current labs reviewed: see below for the results  Lab Results   Component Value Date    WBC 5.5 08/12/2022    HGB 8.8 (L) 08/12/2022    HCT 30.1 (L) 08/12/2022     (H) 08/12/2022     (H) 08/12/2022    LYMPHOPCT 9.7 08/03/2022    RBC 2.99 (L) 08/12/2022    MCHC 29.2 (L) 08/12/2022    RDW 13.9 08/12/2022     Lab Results   Component Value Date     08/12/2022    K 4.0 08/12/2022     " 08/12/2022    CO2 25 08/12/2022    BUN 9 08/12/2022    CREATININE 0.33 (L) 08/12/2022    GLUCOSE 110 (H) 08/12/2022    CALCIUM 8.5 (L) 08/12/2022    PROT 6.3 (L) 07/30/2022    BILITOT 0.6 07/30/2022    ALKPHOS 51 07/30/2022    AST 52 (H) 07/30/2022    ALT 14 07/30/2022     Lab Results   Component Value Date    TSH 23.69 (H) 08/11/2022    TSH 0.71 12/23/2020    TSH 0.47 08/26/2019     Lab Results   Component Value Date    FREET4 0.70 (L) 08/11/2022    FREET4 0.72 (L) 08/11/2022    FREET4 1.46 12/23/2020     Lab Results   Component Value Date    [id number] 670 10/23/2021     Lab Results   Component Value Date    HGBA1C 6.6 (A) 03/26/2022    HGBA1C 6.0 (A) 11/06/2021    HGBA1C 6.2 (A) 09/24/2021     No results found for: \"VITD25\"     Imaging:  Most current imaging studies reviewed: no recent imaging studies ordered    Assessment/Plan   Problem List Items Addressed This Visit             ICD-10-CM    Bilateral leg edema - Primary R60.0     -start lasix 40mg daily x 3 days.   - monitor weights.   - for venous duplex US. Of note, she had a hx of PE in 2021 and was on eliquis for 1 yr. Will restart if w DVT. Chronically, she has decreased mobility.          History of pulmonary embolus (PE) Z86.711       Discussed case with: patient,  facility staff (nurse, aid, PT/OT/SLP, SW and/or DON), and Geriatrics care team      Electronically signed by: Nguyen Ring MD      Electronically Signed By: Nguyen Ring MD   2/16/25  7:03 PM  "

## 2025-02-16 VITALS
OXYGEN SATURATION: 98 % | BODY MASS INDEX: 34.54 KG/M2 | RESPIRATION RATE: 18 BRPM | WEIGHT: 201.2 LBS | TEMPERATURE: 97.6 F | SYSTOLIC BLOOD PRESSURE: 136 MMHG | DIASTOLIC BLOOD PRESSURE: 72 MMHG

## 2025-02-16 PROBLEM — Z86.711 HISTORY OF PULMONARY EMBOLUS (PE): Status: ACTIVE | Noted: 2025-02-16

## 2025-02-16 NOTE — PROGRESS NOTES
Division: Geriatrics  Date of Service: 2/14/2025  Visit Type: Long-Term Care Acute Follow-up Visit      Chief Complaint: edema of lower extremities; Acute visit    Subjective   History obtained from patient and  facility staff (nurse, aid, PT/OT/SLP, JOSE and/or DON).     HPI   Pt complains of worsening edema in the past week. Currently, has difficulty moving legs 2/2 edema. She states she has hx of LE edema, intermittent. Denies SOB. Does not have increasing O2 requirements.    Medical Hx reviewed. See below.   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    Cholesterol serum decreased    Encounter for gynecological examination (general) (routine) without abnormal findings 10/17/2016    Encounter for cervical Pap smear with pelvic exam    Encounter for screening for malignant neoplasm of cervix     Encounter for Papanicolaou smear for cervical cancer screening    Encounter for screening for malignant neoplasm of colon 06/23/2015    Encounter for screening colonoscopy    Erythema intertrigo 10/17/2016    Intertrigo    Glycosuria 11/03/2021    Sugar urinary present    Intertrigo 11/03/2023    Intra-abdominal abscess (Multi) 11/03/2023    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    Lichen sclerosus et atrophicus 11/03/2023    Major depressive disorder 10/26/2023    Malignant neoplasm of thyroid gland (Multi) 06/13/2013    Malignant neoplasm of thyroid gland    NPH (normal pressure hydrocephalus) (Multi) 11/03/2023    Osteoarthrosis, localized, primary, knee 11/03/2023    Other conditions influencing health status 07/15/2015    History of cough    Other disorders of pituitary gland     Empty sella syndrome    Other specified cough  03/16/2020    Upper airway cough syndrome    Other specified personal risk factors, not elsewhere classified 10/17/2016    Encounter for gynecologic examination for high-risk patient covered by Medicare    Other symptoms and signs involving the musculoskeletal system 10/12/2021    Weakness of both lower extremities    Pain in right hip 07/12/2021    Right hip pain    Pain in unspecified ankle and joints of unspecified foot 11/24/2014    Ankle pain    Pain in unspecified foot 11/24/2014    Heel pain    Pain in unspecified foot 08/02/2021    Foot pain    Pain in unspecified knee 12/03/2020    Knee pain    Pain in unspecified knee 11/04/2021    Joint pain, knee    Pain, unspecified 04/12/2022    Acute pain    Personal history of other diseases of the female genital tract 02/21/2017    History of breast pain    Personal history of other diseases of the nervous system and sense organs 02/25/2022    History of peripheral neuropathy    Personal history of other diseases of the nervous system and sense organs 11/24/2014    History of conjunctivitis    Personal history of other endocrine, nutritional and metabolic disease 06/29/2018    History of hyperglycemia    Personal history of other endocrine, nutritional and metabolic disease 11/03/2021    History of hyperglycemia    Personal history of other endocrine, nutritional and metabolic disease     History of hyperlipidemia    Personal history of other medical treatment     History of screening mammography    Personal history of other medical treatment 02/05/2020    History of screening mammography    Personal history of other specified conditions 04/24/2018    History of urinary frequency    Personal history of other specified conditions 04/28/2020    History of convulsions    Personal history of other specified conditions 10/12/2021    History of gait disorder    Personal history of urinary (tract) infections 09/05/2019    History of urinary tract infection    Physical  deconditioning 10/26/2023    Poor balance 11/03/2023    Prediabetes 09/05/2019    Prediabetes    Prediabetes 10/08/2021    Pre-diabetes    Pulmonary embolism 11/03/2023    Trochanteric bursitis of right hip 11/03/2023    Unspecified convulsions (Multi)     Seizures    Unspecified fracture of left toe(s), initial encounter for closed fracture 08/24/2021    Toe fracture, left    Unspecified injury of unspecified foot, initial encounter 04/12/2021    Toe injury     Past Surgical History:   Procedure Laterality Date    KNEE SURGERY  08/23/2013    Knee Surgery Left    MR HEAD ANGIO WO IV CONTRAST  10/10/2020    MR HEAD ANGIO WO IV CONTRAST 10/10/2020 [facility]CILLARY LEGAC[facility]    MR NECK ANGIO WO IV CONTRAST  10/10/2020    MR NECK ANGIO WO IV CONTRAST 10/10/2020 [facility]    OTHER SURGICAL HISTORY  02/25/2022    Neck surgery    TONSILLECTOMY  08/23/2013    Tonsillectomy    TOTAL THYROIDECTOMY  10/21/2013    Thyroid Surgery Total Thyroidectomy    TUBAL LIGATION  08/23/2013    Tubal Ligation    US GUIDED PERCUTANEOUS PERITONEAL OR RETROPERITONEAL FLUID COLLECTION DRAINAGE  8/1/2022    US GUIDED PERCUTANEOUS PERITONEAL OR RETROPERITONEAL FLUID COLLECTION DRAINAGE 8/1/2022 [facility] CLINICAL LEGACY     Family History   Problem Relation Name Age of Onset    Memory loss Mother      Heart disease Father      Hyperlipidemia Father      Depression Daughter      Autism Son      Depression Son      Diabetes Maternal Grandmother       Social History     Tobacco Use    Smoking status: Never     Passive exposure: Never    Smokeless tobacco: Never   Substance Use Topics    Alcohol use: Not Currently       Allergies: Ciprofloxacin, Dilantin [phenytoin sodium extended], Codeine, Naproxen, and Phenytoin    Medications reviewed and reconciled.   Current Outpatient Medications   Medication Sig Dispense Refill    acetaminophen (Tylenol) 325 mg tablet Take 2 tablets (650 mg) by mouth every 4 hours if needed. DO NOT EXCEED 3000 MG IN 24 HOURS       carboxymethylcellulose (Refresh Plus) 0.5 % ophthalmic solution Administer 1 drop into both eyes 4 times a day.      cholecalciferol (Vitamin D-3) 25 MCG (1000 UT) tablet Take 4 tablets (4,000 Units) by mouth once daily.      diclofenac sodium 1 % kit every 6 hours.  apply 4 G to lower ext or 2G to upper ext up to 4x per day prn pain      escitalopram (Lexapro) 5 mg tablet Take 1 tablet (5 mg) by mouth once daily.      eucerin cream Apply topically 2 times a day. To affected areas      hydrocortisone 1 % cream Apply topically 2 times a day as needed. To affected areas      lacosamide (Vimpat) 100 mg tablet Take 1 tablet (100 mg) by mouth every 12 hours. 60 tablet 5    levETIRAcetam (Keppra) 750 mg tablet Take 1 tablet (750 mg) by mouth 2 times a day.      levothyroxine (Synthroid) 88 mcg tablet Take 1 tablet (88 mcg) by mouth once daily.      lisinopril 5 mg tablet Take 1 tablet (5 mg) by mouth once daily.      pantoprazole (ProtoNix) 20 mg EC tablet Take 1 tablet (20 mg) by mouth once daily in the morning. Take before meals. Do not crush, chew, or split.      simvastatin (Zocor) 20 mg tablet Take 1 tablet (20 mg) by mouth once daily in the evening.       No current facility-administered medications for this visit.       Objective   /72   Temp 36.4 °C (97.6 °F)   Resp 18   Wt 91.3 kg (201 lb 3.2 oz)   SpO2 98%   BMI 34.54 kg/m²   Physical Exam    Labs:  Most current labs reviewed: see below for the results  Lab Results   Component Value Date    WBC 5.5 08/12/2022    HGB 8.8 (L) 08/12/2022    HCT 30.1 (L) 08/12/2022     (H) 08/12/2022     (H) 08/12/2022    LYMPHOPCT 9.7 08/03/2022    RBC 2.99 (L) 08/12/2022    MCHC 29.2 (L) 08/12/2022    RDW 13.9 08/12/2022     Lab Results   Component Value Date     08/12/2022    K 4.0 08/12/2022     08/12/2022    CO2 25 08/12/2022    BUN 9 08/12/2022    CREATININE 0.33 (L) 08/12/2022    GLUCOSE 110 (H) 08/12/2022    CALCIUM 8.5 (L) 08/12/2022  "   PROT 6.3 (L) 07/30/2022    BILITOT 0.6 07/30/2022    ALKPHOS 51 07/30/2022    AST 52 (H) 07/30/2022    ALT 14 07/30/2022     Lab Results   Component Value Date    TSH 23.69 (H) 08/11/2022    TSH 0.71 12/23/2020    TSH 0.47 08/26/2019     Lab Results   Component Value Date    FREET4 0.70 (L) 08/11/2022    FREET4 0.72 (L) 08/11/2022    FREET4 1.46 12/23/2020     Lab Results   Component Value Date    [id number] 670 10/23/2021     Lab Results   Component Value Date    HGBA1C 6.6 (A) 03/26/2022    HGBA1C 6.0 (A) 11/06/2021    HGBA1C 6.2 (A) 09/24/2021     No results found for: \"VITD25\"     Imaging:  Most current imaging studies reviewed: no recent imaging studies ordered    Assessment/Plan    Problem List Items Addressed This Visit             ICD-10-CM    Bilateral leg edema - Primary R60.0     -start lasix 40mg daily x 3 days.   - monitor weights.   - for venous duplex US. Of note, she had a hx of PE in 2021 and was on eliquis for 1 yr. Will restart if w DVT. Chronically, she has decreased mobility.          History of pulmonary embolus (PE) Z86.711       Discussed case with: patient,  facility staff (nurse, aid, PT/OT/SLP, SW and/or DON), and Geriatrics care team      Electronically signed by: Nguyen Ring MD  "

## 2025-02-17 NOTE — ASSESSMENT & PLAN NOTE
-start lasix 40mg daily x 3 days.   - monitor weights.   - for venous duplex US. Of note, she had a hx of PE in 2021 and was on eliquis for 1 yr. Will restart if w DVT. Chronically, she has decreased mobility.

## 2025-02-20 ENCOUNTER — NURSING HOME VISIT (OUTPATIENT)
Dept: POST ACUTE CARE | Facility: EXTERNAL LOCATION | Age: 80
End: 2025-02-20
Payer: MEDICARE

## 2025-02-20 DIAGNOSIS — E83.42 HYPOMAGNESEMIA: ICD-10-CM

## 2025-02-20 DIAGNOSIS — R60.0 BILATERAL LEG EDEMA: Primary | ICD-10-CM

## 2025-02-20 PROCEDURE — G2211 COMPLEX E/M VISIT ADD ON: HCPCS | Performed by: INTERNAL MEDICINE

## 2025-02-20 PROCEDURE — 99308 SBSQ NF CARE LOW MDM 20: CPT | Performed by: INTERNAL MEDICINE

## 2025-02-20 NOTE — LETTER
Patient: Shikha Soliz  : 1945    Encounter Date: 2025      Division: Geriatrics  Date of Service: 2025  Visit Type: Long-Term Care Acute Follow-up Visit      Chief Complaint: Edema; Acute visit    Subjective  History obtained from patient,  facility staff (nurse, aid, PT/OT/SLP, SW and/or DON), and medical records.     HPI   Per facility staff, pt refuses to be weighed. Pt denies this. No weight since acute edema of Les started. Still mildly swollen vs baseline. Denies SOB.     Medical Hx reviewed. See below.   Past Medical History:   Diagnosis Date   • Abrasion, left lesser toe(s), initial encounter 2021    Abrasion of toe of left foot, initial encounter   • Colon polyps 2023   • Convulsions (Multi) 2023   • Decreased white blood cell count, unspecified 2020    WBC decreased   • Disorder of lipoprotein metabolism, unspecified 2014    Cholesterol serum decreased   • Encounter for gynecological examination (general) (routine) without abnormal findings 10/17/2016    Encounter for cervical Pap smear with pelvic exam   • Encounter for screening for malignant neoplasm of cervix     Encounter for Papanicolaou smear for cervical cancer screening   • Encounter for screening for malignant neoplasm of colon 2015    Encounter for screening colonoscopy   • Erythema intertrigo 10/17/2016    Intertrigo   • Glycosuria 2021    Sugar urinary present   • Intertrigo 2023   • Intra-abdominal abscess (Multi) 2023   • Left knee DJD 2023   • Lichen sclerosus et atrophicus 2023   • Major depressive disorder 10/26/2023   • Malignant neoplasm of thyroid gland (Multi) 2013    Malignant neoplasm of thyroid gland   • NPH (normal pressure hydrocephalus) (Multi) 2023   • Osteoarthrosis, localized, primary, knee 2023   • Other conditions influencing health status 07/15/2015    History of cough   • Other disorders of pituitary gland     Empty  sella syndrome   • Other specified cough 03/16/2020    Upper airway cough syndrome   • Other specified personal risk factors, not elsewhere classified 10/17/2016    Encounter for gynecologic examination for high-risk patient covered by Medicare   • Other symptoms and signs involving the musculoskeletal system 10/12/2021    Weakness of both lower extremities   • Pain in right hip 07/12/2021    Right hip pain   • Pain in unspecified ankle and joints of unspecified foot 11/24/2014    Ankle pain   • Pain in unspecified foot 11/24/2014    Heel pain   • Pain in unspecified foot 08/02/2021    Foot pain   • Pain in unspecified knee 12/03/2020    Knee pain   • Pain in unspecified knee 11/04/2021    Joint pain, knee   • Pain, unspecified 04/12/2022    Acute pain   • Personal history of other diseases of the female genital tract 02/21/2017    History of breast pain   • Personal history of other diseases of the nervous system and sense organs 02/25/2022    History of peripheral neuropathy   • Personal history of other diseases of the nervous system and sense organs 11/24/2014    History of conjunctivitis   • Personal history of other endocrine, nutritional and metabolic disease 06/29/2018    History of hyperglycemia   • Personal history of other endocrine, nutritional and metabolic disease 11/03/2021    History of hyperglycemia   • Personal history of other endocrine, nutritional and metabolic disease     History of hyperlipidemia   • Personal history of other medical treatment     History of screening mammography   • Personal history of other medical treatment 02/05/2020    History of screening mammography   • Personal history of other specified conditions 04/24/2018    History of urinary frequency   • Personal history of other specified conditions 04/28/2020    History of convulsions   • Personal history of other specified conditions 10/12/2021    History of gait disorder   • Personal history of urinary (tract) infections  09/05/2019    History of urinary tract infection   • Physical deconditioning 10/26/2023   • Poor balance 11/03/2023   • Prediabetes 09/05/2019    Prediabetes   • Prediabetes 10/08/2021    Pre-diabetes   • Pulmonary embolism 11/03/2023   • Trochanteric bursitis of right hip 11/03/2023   • Unspecified convulsions (Multi)     Seizures   • Unspecified fracture of left toe(s), initial encounter for closed fracture 08/24/2021    Toe fracture, left   • Unspecified injury of unspecified foot, initial encounter 04/12/2021    Toe injury     Past Surgical History:   Procedure Laterality Date   • KNEE SURGERY  08/23/2013    Knee Surgery Left   • MR HEAD ANGIO WO IV CONTRAST  10/10/2020    MR HEAD ANGIO WO IV CONTRAST 10/10/2020 [facility] [place] [facility]CILL[facility] LEGAC[facility]   • MR NECK ANGIO WO IV CONTRAST  10/10/2020    MR NECK ANGIO WO IV CONTRAST 10/10/2020 [facility]CILL[facility]GAC[facility]   • OTHER SURGICAL HISTORY  02/25/2022    Neck surgery   • TONSILLECTOMY  08/23/2013    Tonsillectomy   • TOTAL THYROIDECTOMY  10/21/2013    Thyroid Surgery Total Thyroidectomy   • TUBAL LIGATION  08/23/2013    Tubal Ligation   • US GUIDED PERCUTANEOUS PERITONEAL OR RETROPERITONEAL FLUID COLLECTION DRAINAGE  8/1/2022    US GUIDED PERCUTANEOUS PERITONEAL OR RETROPERITONEAL FLUID COLLECTION DRAINAGE 8/1/2022 [facility] CLINICAL LEGACY     Family History   Problem Relation Name Age of Onset   • Memory loss Mother     • Heart disease Father     • Hyperlipidemia Father     • Depression Daughter     • Autism Son     • Depression Son     • Diabetes Maternal Grandmother       Social History     Tobacco Use   • Smoking status: Never     Passive exposure: Never   • Smokeless tobacco: Never   Substance Use Topics   • Alcohol use: Not Currently       Allergies: Ciprofloxacin, Dilantin [phenytoin sodium extended], Codeine, Naproxen, and Phenytoin    Medications reviewed and reconciled.   Current Outpatient Medications   Medication Sig Dispense Refill   • acetaminophen (Tylenol) 325 mg tablet  Take 2 tablets (650 mg) by mouth every 4 hours if needed. DO NOT EXCEED 3000 MG IN 24 HOURS     • carboxymethylcellulose (Refresh Plus) 0.5 % ophthalmic solution Administer 1 drop into both eyes 4 times a day.     • cholecalciferol (Vitamin D-3) 25 MCG (1000 UT) tablet Take 4 tablets (4,000 Units) by mouth once daily.     • diclofenac sodium 1 % kit every 6 hours.  apply 4 G to lower ext or 2G to upper ext up to 4x per day prn pain     • escitalopram (Lexapro) 5 mg tablet Take 1 tablet (5 mg) by mouth once daily.     • eucerin cream Apply topically 2 times a day. To affected areas     • hydrocortisone 1 % cream Apply topically 2 times a day as needed. To affected areas     • lacosamide (Vimpat) 100 mg tablet Take 1 tablet (100 mg) by mouth every 12 hours. 60 tablet 5   • levETIRAcetam (Keppra) 750 mg tablet Take 1 tablet (750 mg) by mouth 2 times a day.     • levothyroxine (Synthroid) 88 mcg tablet Take 1 tablet (88 mcg) by mouth once daily.     • lisinopril 5 mg tablet Take 1 tablet (5 mg) by mouth once daily.     • pantoprazole (ProtoNix) 20 mg EC tablet Take 1 tablet (20 mg) by mouth once daily in the morning. Take before meals. Do not crush, chew, or split.     • simvastatin (Zocor) 20 mg tablet Take 1 tablet (20 mg) by mouth once daily in the evening.       No current facility-administered medications for this visit.       Objective  There were no vitals taken for this visit.    Physical Exam  Vitals reviewed.   Constitutional:       General: She is not in acute distress.     Comments: Sitting in bed, eating lunch and watching tv, on room air   HENT:      Head: Normocephalic and atraumatic.      Nose: Nose normal.      Mouth/Throat:      Mouth: Mucous membranes are moist.      Pharynx: Oropharynx is clear.   Pulmonary:      Effort: Pulmonary effort is normal.   Abdominal:      General: Abdomen is flat. Bowel sounds are normal.      Palpations: Abdomen is soft.   Musculoskeletal:         General: Normal range of  "motion.      Cervical back: Normal range of motion and neck supple.      Right lower leg: Edema (mild nonpitting) present.      Left lower leg: Edema (mild nonpitting) present.      Comments: B/l foot drop   Skin:     General: Skin is warm and dry.      Capillary Refill: Capillary refill takes less than 2 seconds.   Neurological:      General: No focal deficit present.      Mental Status: She is alert. Mental status is at baseline. She is disoriented.      Gait: Gait abnormal.      Comments: Cognitive impairment noted.   Psychiatric:         Mood and Affect: Mood normal.         Labs:  Most current labs reviewed: see below for the results    Lab Results   Component Value Date    WBC 5.5 08/12/2022    HGB 8.8 (L) 08/12/2022    HCT 30.1 (L) 08/12/2022     (H) 08/12/2022     (H) 08/12/2022    LYMPHOPCT 9.7 08/03/2022    RBC 2.99 (L) 08/12/2022    MCHC 29.2 (L) 08/12/2022    RDW 13.9 08/12/2022     Lab Results   Component Value Date     08/12/2022    K 4.0 08/12/2022     08/12/2022    CO2 25 08/12/2022    BUN 9 08/12/2022    CREATININE 0.33 (L) 08/12/2022    GLUCOSE 110 (H) 08/12/2022    CALCIUM 8.5 (L) 08/12/2022    PROT 6.3 (L) 07/30/2022    BILITOT 0.6 07/30/2022    ALKPHOS 51 07/30/2022    AST 52 (H) 07/30/2022    ALT 14 07/30/2022     Lab Results   Component Value Date    TSH 23.69 (H) 08/11/2022    TSH 0.71 12/23/2020    TSH 0.47 08/26/2019     Lab Results   Component Value Date    FREET4 0.70 (L) 08/11/2022    FREET4 0.72 (L) 08/11/2022    FREET4 1.46 12/23/2020     Lab Results   Component Value Date    [id number] 670 10/23/2021     Lab Results   Component Value Date    HGBA1C 6.6 (A) 03/26/2022    HGBA1C 6.0 (A) 11/06/2021    HGBA1C 6.2 (A) 09/24/2021     No results found for: \"VITD25\"     Imaging:  Most current imaging studies reviewed: see below for the results  2/17/2025 bilateral venous duplex US: negative    Assessment/Plan   Problem List Items Addressed This Visit             " ICD-10-CM    Bilateral leg edema - Primary R60.0    Hypomagnesemia E83.42   - lasix 40mg daily has been restarted on 2/19 til 2/25/2025  - recheck BMP and Mg level on 3/3/2025.     Discussed case with: patient,  facility staff (nurse, aid, PT/OT/SLP, SW and/or DON), and Geriatrics care team      Electronically signed by: Nguyen Ring MD      Electronically Signed By: Nguyen Ring MD   2/21/25  7:12 PM

## 2025-02-21 NOTE — PROGRESS NOTES
Division: Geriatrics  Date of Service: 2/21/2025  Visit Type: Long-Term Care Acute Follow-up Visit      Chief Complaint: Edema; Acute visit    Subjective   History obtained from patient,  facility staff (nurse, aid, PT/OT/SLP, SW and/or DON), and medical records.     HPI   Per facility staff, pt refuses to be weighed. Pt denies this. No weight since acute edema of Les started. Still mildly swollen vs baseline. Denies SOB.     Medical Hx reviewed. See below.   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    Cholesterol serum decreased    Encounter for gynecological examination (general) (routine) without abnormal findings 10/17/2016    Encounter for cervical Pap smear with pelvic exam    Encounter for screening for malignant neoplasm of cervix     Encounter for Papanicolaou smear for cervical cancer screening    Encounter for screening for malignant neoplasm of colon 06/23/2015    Encounter for screening colonoscopy    Erythema intertrigo 10/17/2016    Intertrigo    Glycosuria 11/03/2021    Sugar urinary present    Intertrigo 11/03/2023    Intra-abdominal abscess (Multi) 11/03/2023    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    Lichen sclerosus et atrophicus 11/03/2023    Major depressive disorder 10/26/2023    Malignant neoplasm of thyroid gland (Multi) 06/13/2013    Malignant neoplasm of thyroid gland    NPH (normal pressure hydrocephalus) (Multi) 11/03/2023    Osteoarthrosis, localized, primary, knee 11/03/2023    Other conditions influencing health status 07/15/2015    History of cough    Other disorders of pituitary gland     Empty sella syndrome    Other specified cough 03/16/2020    Upper airway cough syndrome    Other  specified personal risk factors, not elsewhere classified 10/17/2016    Encounter for gynecologic examination for high-risk patient covered by Medicare    Other symptoms and signs involving the musculoskeletal system 10/12/2021    Weakness of both lower extremities    Pain in right hip 07/12/2021    Right hip pain    Pain in unspecified ankle and joints of unspecified foot 11/24/2014    Ankle pain    Pain in unspecified foot 11/24/2014    Heel pain    Pain in unspecified foot 08/02/2021    Foot pain    Pain in unspecified knee 12/03/2020    Knee pain    Pain in unspecified knee 11/04/2021    Joint pain, knee    Pain, unspecified 04/12/2022    Acute pain    Personal history of other diseases of the female genital tract 02/21/2017    History of breast pain    Personal history of other diseases of the nervous system and sense organs 02/25/2022    History of peripheral neuropathy    Personal history of other diseases of the nervous system and sense organs 11/24/2014    History of conjunctivitis    Personal history of other endocrine, nutritional and metabolic disease 06/29/2018    History of hyperglycemia    Personal history of other endocrine, nutritional and metabolic disease 11/03/2021    History of hyperglycemia    Personal history of other endocrine, nutritional and metabolic disease     History of hyperlipidemia    Personal history of other medical treatment     History of screening mammography    Personal history of other medical treatment 02/05/2020    History of screening mammography    Personal history of other specified conditions 04/24/2018    History of urinary frequency    Personal history of other specified conditions 04/28/2020    History of convulsions    Personal history of other specified conditions 10/12/2021    History of gait disorder    Personal history of urinary (tract) infections 09/05/2019    History of urinary tract infection    Physical deconditioning 10/26/2023    Poor balance 11/03/2023     Prediabetes 09/05/2019    Prediabetes    Prediabetes 10/08/2021    Pre-diabetes    Pulmonary embolism 11/03/2023    Trochanteric bursitis of right hip 11/03/2023    Unspecified convulsions (Multi)     Seizures    Unspecified fracture of left toe(s), initial encounter for closed fracture 08/24/2021    Toe fracture, left    Unspecified injury of unspecified foot, initial encounter 04/12/2021    Toe injury     Past Surgical History:   Procedure Laterality Date    KNEE SURGERY  08/23/2013    Knee Surgery Left    MR HEAD ANGIO WO IV CONTRAST  10/10/2020    MR HEAD ANGIO WO IV CONTRAST 10/10/2020 [facility]    MR NECK ANGIO WO IV CONTRAST  10/10/2020    MR NECK ANGIO WO IV CONTRAST 10/10/2020 [facility]    OTHER SURGICAL HISTORY  02/25/2022    Neck surgery    TONSILLECTOMY  08/23/2013    Tonsillectomy    TOTAL THYROIDECTOMY  10/21/2013    Thyroid Surgery Total Thyroidectomy    TUBAL LIGATION  08/23/2013    Tubal Ligation    US GUIDED PERCUTANEOUS PERITONEAL OR RETROPERITONEAL FLUID COLLECTION DRAINAGE  8/1/2022    US GUIDED PERCUTANEOUS PERITONEAL OR RETROPERITONEAL FLUID COLLECTION DRAINAGE 8/1/2022 [facility] CL[facility]ICAL LEGAC[facility]     Family History   Problem Relation Name Age of Onset    Memory loss Mother      Heart disease Father      Hyperlipidemia Father      Depression Daughter      Autism Son      Depression Son      Diabetes Maternal Grandmother       Social History     Tobacco Use    Smoking status: Never     Passive exposure: Never    Smokeless tobacco: Never   Substance Use Topics    Alcohol use: Not Currently       Allergies: Ciprofloxacin, Dilantin [phenytoin sodium extended], Codeine, Naproxen, and Phenytoin    Medications reviewed and reconciled.   Current Outpatient Medications   Medication Sig Dispense Refill    acetaminophen (Tylenol) 325 mg tablet Take 2 tablets (650 mg) by mouth every 4 hours if needed. DO NOT EXCEED 3000 MG IN 24 HOURS      carboxymethylcellulose (Refresh Plus) 0.5 %  ophthalmic solution Administer 1 drop into both eyes 4 times a day.      cholecalciferol (Vitamin D-3) 25 MCG (1000 UT) tablet Take 4 tablets (4,000 Units) by mouth once daily.      diclofenac sodium 1 % kit every 6 hours.  apply 4 G to lower ext or 2G to upper ext up to 4x per day prn pain      escitalopram (Lexapro) 5 mg tablet Take 1 tablet (5 mg) by mouth once daily.      eucerin cream Apply topically 2 times a day. To affected areas      hydrocortisone 1 % cream Apply topically 2 times a day as needed. To affected areas      lacosamide (Vimpat) 100 mg tablet Take 1 tablet (100 mg) by mouth every 12 hours. 60 tablet 5    levETIRAcetam (Keppra) 750 mg tablet Take 1 tablet (750 mg) by mouth 2 times a day.      levothyroxine (Synthroid) 88 mcg tablet Take 1 tablet (88 mcg) by mouth once daily.      lisinopril 5 mg tablet Take 1 tablet (5 mg) by mouth once daily.      pantoprazole (ProtoNix) 20 mg EC tablet Take 1 tablet (20 mg) by mouth once daily in the morning. Take before meals. Do not crush, chew, or split.      simvastatin (Zocor) 20 mg tablet Take 1 tablet (20 mg) by mouth once daily in the evening.       No current facility-administered medications for this visit.       Objective   There were no vitals taken for this visit.    Physical Exam  Vitals reviewed.   Constitutional:       General: She is not in acute distress.     Comments: Sitting in bed, eating lunch and watching tv, on room air   HENT:      Head: Normocephalic and atraumatic.      Nose: Nose normal.      Mouth/Throat:      Mouth: Mucous membranes are moist.      Pharynx: Oropharynx is clear.   Pulmonary:      Effort: Pulmonary effort is normal.   Abdominal:      General: Abdomen is flat. Bowel sounds are normal.      Palpations: Abdomen is soft.   Musculoskeletal:         General: Normal range of motion.      Cervical back: Normal range of motion and neck supple.      Right lower leg: Edema (mild nonpitting) present.      Left lower leg: Edema  "(mild nonpitting) present.      Comments: B/l foot drop   Skin:     General: Skin is warm and dry.      Capillary Refill: Capillary refill takes less than 2 seconds.   Neurological:      General: No focal deficit present.      Mental Status: She is alert. Mental status is at baseline. She is disoriented.      Gait: Gait abnormal.      Comments: Cognitive impairment noted.   Psychiatric:         Mood and Affect: Mood normal.         Labs:  Most current labs reviewed: see below for the results    Lab Results   Component Value Date    WBC 5.5 08/12/2022    HGB 8.8 (L) 08/12/2022    HCT 30.1 (L) 08/12/2022     (H) 08/12/2022     (H) 08/12/2022    LYMPHOPCT 9.7 08/03/2022    RBC 2.99 (L) 08/12/2022    MCHC 29.2 (L) 08/12/2022    RDW 13.9 08/12/2022     Lab Results   Component Value Date     08/12/2022    K 4.0 08/12/2022     08/12/2022    CO2 25 08/12/2022    BUN 9 08/12/2022    CREATININE 0.33 (L) 08/12/2022    GLUCOSE 110 (H) 08/12/2022    CALCIUM 8.5 (L) 08/12/2022    PROT 6.3 (L) 07/30/2022    BILITOT 0.6 07/30/2022    ALKPHOS 51 07/30/2022    AST 52 (H) 07/30/2022    ALT 14 07/30/2022     Lab Results   Component Value Date    TSH 23.69 (H) 08/11/2022    TSH 0.71 12/23/2020    TSH 0.47 08/26/2019     Lab Results   Component Value Date    FREET4 0.70 (L) 08/11/2022    FREET4 0.72 (L) 08/11/2022    FREET4 1.46 12/23/2020     Lab Results   Component Value Date    [id number] 670 10/23/2021     Lab Results   Component Value Date    HGBA1C 6.6 (A) 03/26/2022    HGBA1C 6.0 (A) 11/06/2021    HGBA1C 6.2 (A) 09/24/2021     No results found for: \"VITD25\"     Imaging:  Most current imaging studies reviewed: see below for the results  2/17/2025 bilateral venous duplex US: negative    Assessment/Plan    Problem List Items Addressed This Visit             ICD-10-CM    Bilateral leg edema - Primary R60.0    Hypomagnesemia E83.42   - lasix 40mg daily has been restarted on 2/19 til 2/25/2025  - recheck BMP " and Mg level on 3/3/2025.     Discussed case with: patient,  facility staff (nurse, aid, PT/OT/SLP, SW and/or DON), and Geriatrics care team      Electronically signed by: Nguyen Ring MD

## 2025-03-17 ENCOUNTER — APPOINTMENT (OUTPATIENT)
Dept: ORTHOPEDIC SURGERY | Facility: HOSPITAL | Age: 80
End: 2025-03-17
Payer: MEDICARE

## 2025-03-24 DIAGNOSIS — G40.909 SEIZURE DISORDER (MULTI): ICD-10-CM

## 2025-03-24 RX ORDER — LACOSAMIDE 100 MG/1
100 TABLET ORAL EVERY 12 HOURS
Qty: 60 TABLET | Refills: 5 | Status: SHIPPED | OUTPATIENT
Start: 2025-03-24 | End: 2026-03-24

## 2025-03-24 NOTE — PROGRESS NOTES
Per NH staff, pt is due for refill of lacosamide. Will send order. OARRS reviewed. No signs of abuse or misuse.

## 2025-03-25 ENCOUNTER — NURSING HOME VISIT (OUTPATIENT)
Dept: POST ACUTE CARE | Facility: EXTERNAL LOCATION | Age: 80
End: 2025-03-25
Payer: MEDICARE

## 2025-03-25 VITALS
DIASTOLIC BLOOD PRESSURE: 82 MMHG | RESPIRATION RATE: 18 BRPM | SYSTOLIC BLOOD PRESSURE: 147 MMHG | BODY MASS INDEX: 30.61 KG/M2 | OXYGEN SATURATION: 97 % | HEART RATE: 63 BPM | TEMPERATURE: 97.2 F | WEIGHT: 178.3 LBS

## 2025-03-25 DIAGNOSIS — Z86.711 HISTORY OF PULMONARY EMBOLUS (PE): ICD-10-CM

## 2025-03-25 DIAGNOSIS — R00.1 BRADYCARDIA: ICD-10-CM

## 2025-03-25 DIAGNOSIS — R73.03 PREDIABETES: ICD-10-CM

## 2025-03-25 DIAGNOSIS — F41.9 ANXIETY AND DEPRESSION: ICD-10-CM

## 2025-03-25 DIAGNOSIS — E78.2 MIXED HYPERLIPIDEMIA: ICD-10-CM

## 2025-03-25 DIAGNOSIS — M24.569 CONTRACTURE OF JOINT OF LOWER LEG: ICD-10-CM

## 2025-03-25 DIAGNOSIS — G45.9 TIA (TRANSIENT ISCHEMIC ATTACK): ICD-10-CM

## 2025-03-25 DIAGNOSIS — F03.90 DEMENTIA WITHOUT BEHAVIORAL DISTURBANCE, PSYCHOTIC DISTURBANCE, MOOD DISTURBANCE, OR ANXIETY, UNSPECIFIED DEMENTIA SEVERITY, UNSPECIFIED DEMENTIA TYPE: ICD-10-CM

## 2025-03-25 DIAGNOSIS — R53.81 DEBILITY: ICD-10-CM

## 2025-03-25 DIAGNOSIS — R60.0 BILATERAL LEG EDEMA: Primary | ICD-10-CM

## 2025-03-25 DIAGNOSIS — I10 PRIMARY HYPERTENSION: ICD-10-CM

## 2025-03-25 DIAGNOSIS — K26.9 DUODENAL ULCER: ICD-10-CM

## 2025-03-25 DIAGNOSIS — G40.909 SEIZURE DISORDER (MULTI): ICD-10-CM

## 2025-03-25 DIAGNOSIS — R63.4 WEIGHT LOSS: ICD-10-CM

## 2025-03-25 DIAGNOSIS — E03.9 HYPOTHYROIDISM, UNSPECIFIED TYPE: ICD-10-CM

## 2025-03-25 DIAGNOSIS — F32.A ANXIETY AND DEPRESSION: ICD-10-CM

## 2025-03-25 PROBLEM — M21.40 PES PLANUS: Status: RESOLVED | Noted: 2023-11-03 | Resolved: 2025-03-25

## 2025-03-25 PROBLEM — H04.411: Status: RESOLVED | Noted: 2023-11-03 | Resolved: 2025-03-25

## 2025-03-25 PROBLEM — Z87.898 HISTORY OF ABDOMINAL ABSCESS: Status: RESOLVED | Noted: 2023-10-26 | Resolved: 2025-03-25

## 2025-03-25 PROBLEM — C73: Status: RESOLVED | Noted: 2023-11-03 | Resolved: 2025-03-25

## 2025-03-25 PROBLEM — R11.2 NAUSEA AND VOMITING: Status: RESOLVED | Noted: 2025-01-07 | Resolved: 2025-03-25

## 2025-03-25 PROBLEM — A08.4 VIRAL GASTROENTERITIS: Status: RESOLVED | Noted: 2025-01-12 | Resolved: 2025-03-25

## 2025-03-25 PROBLEM — C73 PAPILLARY CARCINOMA OF THYROID: Status: RESOLVED | Noted: 2023-11-03 | Resolved: 2025-03-25

## 2025-03-25 PROBLEM — R73.9 HYPERGLYCEMIA: Status: RESOLVED | Noted: 2023-11-03 | Resolved: 2025-03-25

## 2025-03-25 PROBLEM — H04.551 ACQUIRED STENOSIS OF RIGHT NASOLACRIMAL DUCT: Status: RESOLVED | Noted: 2023-11-03 | Resolved: 2025-03-25

## 2025-03-25 PROBLEM — N95.2 ATROPHY OF VAGINA: Status: RESOLVED | Noted: 2023-11-03 | Resolved: 2025-03-25

## 2025-03-25 PROBLEM — R87.610 ASCUS WITH POSITIVE HIGH RISK HPV CERVICAL: Status: RESOLVED | Noted: 2023-11-03 | Resolved: 2025-03-25

## 2025-03-25 PROBLEM — K21.9 GASTROESOPHAGEAL REFLUX DISEASE WITHOUT ESOPHAGITIS: Status: RESOLVED | Noted: 2023-10-26 | Resolved: 2025-03-25

## 2025-03-25 PROBLEM — R19.5 LOOSE STOOLS: Status: RESOLVED | Noted: 2025-01-07 | Resolved: 2025-03-25

## 2025-03-25 PROBLEM — M72.2 PLANTAR FASCIITIS: Status: RESOLVED | Noted: 2023-11-03 | Resolved: 2025-03-25

## 2025-03-25 PROBLEM — R87.810 ASCUS WITH POSITIVE HIGH RISK HPV CERVICAL: Status: RESOLVED | Noted: 2023-11-03 | Resolved: 2025-03-25

## 2025-03-25 PROBLEM — E83.42 HYPOMAGNESEMIA: Status: RESOLVED | Noted: 2024-12-04 | Resolved: 2025-03-25

## 2025-03-25 PROCEDURE — 99309 SBSQ NF CARE MODERATE MDM 30: CPT | Performed by: INTERNAL MEDICINE

## 2025-03-25 PROCEDURE — G2211 COMPLEX E/M VISIT ADD ON: HCPCS | Performed by: INTERNAL MEDICINE

## 2025-03-25 NOTE — ASSESSMENT & PLAN NOTE
-Recent venous duplex ultrasound negative for DVT.  Probably secondary to immobility/dependent edema.

## 2025-03-25 NOTE — ASSESSMENT & PLAN NOTE
-Discussed with patient immobility causing the pain in legs (from contracture).  Has been referred to physical therapy for this.  Advised to get out of bed daily.  She states she will try

## 2025-03-25 NOTE — ASSESSMENT & PLAN NOTE
-she had a hx of PE in 2021 and was on eliquis for 1 yr   Chronically has decreased mobility.  At risk for another PE/DVT-

## 2025-03-25 NOTE — LETTER
Patient: Shikha Soliz  : 1945    Encounter Date: 2025      Division: Geriatrics  Date of Service: 3/25/2025  Visit Type: Long-Term Care Regulatory Follow-up Visit      Chief Complaint: No chief complaint on file.; 60 day visit    Subjective  History obtained from patient,  facility staff (nurse, aid, PT/OT/SLP, SW and/or DON), and medical records.     HPI   Hospitalization/ER visits: None  Memory: No significant change  Mood changes: Denies worsening of mood since being off Lexapro on   Sleep: No issues reported  Falls: None  Med Changes/OTC meds: Lexapro DC'd on  to reduce pill burden.  Pain: Has been complaining of bilateral leg pain.  Of note, patient rarely gets out of bed.  Always lying down in bed.    BM regular  Appetite: Fair  Weight: Reports showed weight loss from 200 pounds in 3/1 to 178 pounds yesterday.  Per patient, this is mostly not accurate.  She stated she was not balanced when she was weighed yesterday.  Denies decreased appetite.    Blood pressures have been ranging from 90s to 140s over 50s to 70s.    Chronic bradycardia-heart rate has been between mid 40s to 70s.  Continues to be asymptomatic.    Medical Hx reviewed. See below.   Past Medical History:   Diagnosis Date   • Abrasion, left lesser toe(s), initial encounter 2021    Abrasion of toe of left foot, initial encounter   • Acquired stenosis of right nasolacrimal duct 2023   • ASCUS with positive high risk HPV cervical 2023   • Chronic dacryocystitis of right lacrimal passage 2023   • Chronic dacryocystitis of right lacrimal sac 2023   • Colon polyps 2023   • Convulsions (Multi) 2023   • Decreased white blood cell count, unspecified 2020    WBC decreased   • Disorder of lipoprotein metabolism, unspecified 2014    Cholesterol serum decreased   • Encounter for gynecological examination (general) (routine) without abnormal findings 10/17/2016    Encounter for cervical  Pap smear with pelvic exam   • Encounter for screening for malignant neoplasm of cervix     Encounter for Papanicolaou smear for cervical cancer screening   • Encounter for screening for malignant neoplasm of colon 06/23/2015    Encounter for screening colonoscopy   • Erythema intertrigo 10/17/2016    Intertrigo   • 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   • Gastroesophageal reflux disease without esophagitis 10/26/2023   • Glycosuria 11/03/2021    Sugar urinary present   • History of abdominal abscess 10/26/2023   • Hyperglycemia 11/03/2023   • Hypomagnesemia 12/04/2024   • Intertrigo 11/03/2023   • Intra-abdominal abscess (Multi) 11/03/2023   • 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   • Lichen sclerosus et atrophicus 11/03/2023   • Major depressive disorder 10/26/2023   • Malignant neoplasm of thyroid gland (Multi) 06/13/2013    Malignant neoplasm of thyroid gland   • NPH (normal pressure hydrocephalus) (Multi) 11/03/2023   • Osteoarthrosis, localized, primary, knee 11/03/2023   • Other conditions influencing health status 07/15/2015    History of cough   • Other disorders of pituitary gland     Empty sella syndrome   • Other specified cough 03/16/2020    Upper airway cough syndrome   • Other specified personal risk factors, not elsewhere classified 10/17/2016    Encounter for gynecologic examination for high-risk patient covered by Medicare   • Other symptoms and signs involving the musculoskeletal system 10/12/2021    Weakness of both lower extremities   • Pain in right hip 07/12/2021    Right hip pain   • Pain in unspecified ankle and joints of unspecified foot 11/24/2014    Ankle pain   • Pain in unspecified foot 11/24/2014    Heel pain   • Pain in unspecified foot 08/02/2021    Foot pain   • Pain in unspecified knee 12/03/2020    Knee pain   • Pain in unspecified knee 11/04/2021    Joint pain, knee   • Pain, unspecified 04/12/2022    Acute pain   • 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    • Personal history of other diseases of the female genital tract 02/21/2017    History of breast pain   • Personal history of other diseases of the nervous system and sense organs 02/25/2022    History of peripheral neuropathy   • Personal history of other diseases of the nervous system and sense organs 11/24/2014    History of conjunctivitis   • Personal history of other endocrine, nutritional and metabolic disease 06/29/2018    History of hyperglycemia   • Personal history of other endocrine, nutritional and metabolic disease 11/03/2021    History of hyperglycemia   • Personal history of other endocrine, nutritional and metabolic disease     History of hyperlipidemia   • Personal history of other medical treatment     History of screening mammography   • Personal history of other medical treatment 02/05/2020    History of screening mammography   • Personal history of other specified conditions 04/24/2018    History of urinary frequency   • Personal history of other specified conditions 04/28/2020    History of convulsions   • Personal history of other specified conditions 10/12/2021    History of gait disorder   • Personal history of urinary (tract) infections 09/05/2019    History of urinary tract infection   • Pes planus 11/03/2023   • Physical deconditioning 10/26/2023   • Poor balance 11/03/2023   • Prediabetes 09/05/2019    Prediabetes   • Prediabetes 10/08/2021    Pre-diabetes   • Pulmonary embolism 11/03/2023   • Trochanteric bursitis of right hip 11/03/2023   • Unspecified convulsions (Multi)     Seizures   • Unspecified fracture of left toe(s), initial encounter for closed fracture 08/24/2021    Toe fracture, left   • Unspecified injury of unspecified foot, initial encounter 04/12/2021    Toe injury     Past Surgical History:   Procedure Laterality Date   • KNEE SURGERY  08/23/2013    Knee Surgery Left   • MR HEAD ANGIO WO IV CONTRAST  10/10/2020    MR HEAD ANGIO WO IV CONTRAST 10/10/2020 [facility]   • MR NECK ANGIO WO IV CONTRAST  10/10/2020    MR NECK ANGIO WO IV CONTRAST 10/10/2020 [facility] ANCILLARY LEGAC[facility]   • OTHER SURGICAL HISTORY  02/25/2022    Neck surgery   • TONSILLECTOMY  08/23/2013    Tonsillectomy   • TOTAL THYROIDECTOMY  10/21/2013    Thyroid Surgery Total Thyroidectomy   • TUBAL LIGATION  08/23/2013    Tubal Ligation   • US GUIDED PERCUTANEOUS PERITONEAL OR RETROPERITONEAL FLUID COLLECTION DRAINAGE  8/1/2022    US GUIDED PERCUTANEOUS PERITONEAL OR RETROPERITONEAL FLUID COLLECTION DRAINAGE 8/1/2022 [facility] CLINICAL LEGACY     Family History   Problem Relation Name Age of Onset   • Memory loss Mother     • Heart disease Father     • Hyperlipidemia Father     • Depression Daughter     • Autism Son     • Depression Son     • Diabetes Maternal Grandmother       Social History     Tobacco Use   • Smoking status: Never     Passive exposure: Never   • Smokeless tobacco: Never   Substance Use Topics   • Alcohol use: Not Currently       Allergies: Ciprofloxacin, Dilantin [phenytoin sodium extended], Codeine, Naproxen, and Phenytoin    Medications reviewed and reconciled.   Current Outpatient Medications   Medication Sig Dispense Refill   • acetaminophen (Tylenol) 325 mg tablet Take 2 tablets (650 mg) by mouth every 4 hours if needed. DO NOT EXCEED 3000 MG IN 24 HOURS     • carboxymethylcellulose (Refresh Plus) 0.5 % ophthalmic solution Administer 1 drop into both eyes 4 times a day.     • cholecalciferol (Vitamin D-3) 25 MCG (1000 UT) tablet Take 4 tablets (4,000 Units) by mouth once daily.     • eucerin cream Apply topically 2 times a day. To affected areas     • lacosamide (Vimpat) 100 mg tablet Take 1 tablet (100 mg) by mouth every 12 hours. 60 tablet 5   • levETIRAcetam (Keppra) 750 mg tablet Take 1 tablet (750 mg) by mouth 2 times a day.     • levothyroxine (Synthroid) 88 mcg tablet Take 1 tablet (88 mcg) by mouth once daily.     • lisinopril 5 mg tablet Take 1 tablet (5 mg) by mouth once daily.     •  pantoprazole (ProtoNix) 20 mg EC tablet Take 1 tablet (20 mg) by mouth once daily in the morning. Take before meals. Do not crush, chew, or split.     • simvastatin (Zocor) 20 mg tablet Take 1 tablet (20 mg) by mouth once daily in the evening.       No current facility-administered medications for this visit.       Objective  /82   Pulse 63   Temp 36.2 °C (97.2 °F)   Resp 18   Wt 80.9 kg (178 lb 4.8 oz)   SpO2 97%   BMI 30.61 kg/m²   Physical Exam  Vitals reviewed.   Constitutional:       General: She is not in acute distress.     Comments: Sitting in bed, watching tv, on room air   HENT:      Head: Normocephalic and atraumatic.      Nose: Nose normal.      Mouth/Throat:      Mouth: Mucous membranes are moist.      Pharynx: Oropharynx is clear.   Pulmonary:      Effort: Pulmonary effort is normal.   Abdominal:      General: Abdomen is flat. Bowel sounds are normal.      Palpations: Abdomen is soft.   Musculoskeletal:         General: Normal range of motion.      Cervical back: Normal range of motion and neck supple.      Right lower leg: No edema.      Left lower leg: Edema (mild nonpitting) present.      Comments: B/l foot drop  Extension contracture of bilateral knees   Skin:     General: Skin is warm and dry.      Capillary Refill: Capillary refill takes less than 2 seconds.   Neurological:      General: No focal deficit present.      Mental Status: She is alert. Mental status is at baseline. She is disoriented.      Gait: Gait abnormal.      Comments: Cognitive impairment noted.   Psychiatric:         Mood and Affect: Mood normal.         Labs:  Most current labs reviewed: no recent labs ordered  Lab Results   Component Value Date    WBC 5.5 08/12/2022    HGB 8.8 (L) 08/12/2022    HCT 30.1 (L) 08/12/2022     (H) 08/12/2022     (H) 08/12/2022    LYMPHOPCT 9.7 08/03/2022    RBC 2.99 (L) 08/12/2022    MCHC 29.2 (L) 08/12/2022    RDW 13.9 08/12/2022     Lab Results   Component Value Date     " 08/12/2022    K 4.0 08/12/2022     08/12/2022    CO2 25 08/12/2022    BUN 9 08/12/2022    CREATININE 0.33 (L) 08/12/2022    GLUCOSE 110 (H) 08/12/2022    CALCIUM 8.5 (L) 08/12/2022    PROT 6.3 (L) 07/30/2022    BILITOT 0.6 07/30/2022    ALKPHOS 51 07/30/2022    AST 52 (H) 07/30/2022    ALT 14 07/30/2022     Lab Results   Component Value Date    TSH 23.69 (H) 08/11/2022    TSH 0.71 12/23/2020    TSH 0.47 08/26/2019     Lab Results   Component Value Date    FREET4 0.70 (L) 08/11/2022    FREET4 0.72 (L) 08/11/2022    FREET4 1.46 12/23/2020     Lab Results   Component Value Date    [id number] 670 10/23/2021     Lab Results   Component Value Date    HGBA1C 6.6 (A) 03/26/2022    HGBA1C 6.0 (A) 11/06/2021    HGBA1C 6.2 (A) 09/24/2021     No results found for: \"VITD25\"     Imaging:  Most current imaging studies reviewed: no recent imaging studies ordered    Assessment/Plan   Problem List Items Addressed This Visit             ICD-10-CM    Hypothyroidism E03.9     -Continue levothyroxine 88 mcg daily.  Controlled on last lab.         Seizure disorder (Multi) G40.909     -Will need implant back level, CBC and LFTs every 6 months.  Continue Keppra 750 mg twice daily and Vimpat 100 mg twice daily  -Appointment with neurology in 4/28         Hyperlipidemia E78.5     -Labs in 3 months.  Contninue zocor 20mg nightly         Debility R53.81     - LTC appropriate         Prediabetes R73.03     -A1c recently has been 6.3%. unlikely going to convert to DM. Off carb control diet         TIA (transient ischemic attack) G45.9     -On simvastatin 20 mg daily. Reviewed records. Should be on ASA 81mg daily. Will restart.         Anxiety and depression F41.9, F32.A     -Controlled without Lexapro         Bradycardia R00.1     -Asymptomatic.  Not on any beta-blocker  -Chronic.  I suspect baseline heart rate when the rate is 40 to 50s.  When asleep around 30s.  -TSH has been normal.   - per previous care team, pt refused work up " for this.   - EKG didn't show any concerning arrhythmia         Bilateral leg edema - Primary R60.0     -Recent venous duplex ultrasound negative for DVT.  Probably secondary to immobility/dependent edema.         Dementia without behavioral disturbance, psychotic disturbance, mood disturbance, or anxiety F03.90     -Suspected to have NPH.  However, will not be a candidate for shunt secondary to debility per neurology         Primary hypertension I10     -Controlled.  Continue lisinopril 5 mg daily         Duodenal ulcer K26.9     - per hospitalization records, had hx of possible perforated duodenal ulcer in 2022. Continue pantoprazole 20mg daily indefinitely         History of pulmonary embolus (PE) Z86.711     -she had a hx of PE in 2021 and was on eliquis for 1 yr   Chronically has decreased mobility.  At risk for another PE/DVT-         Contracture of joint of lower leg M24.569     -Discussed with patient immobility causing the pain in legs (from contracture).  Has been referred to physical therapy for this.  Advised to get out of bed daily.  She states she will try         Weight loss R63.4     -Discussed with IDT team specially with dietitian            Discussed case with: patient,  facility staff (nurse, aid, PT/OT/SLP, SW and/or DON), and Geriatrics care team      Electronically signed by: Nguyen Ring MD      Electronically Signed By: Nguyen Ring MD   3/25/25  5:17 PM

## 2025-03-25 NOTE — ASSESSMENT & PLAN NOTE
-Will need implant back level, CBC and LFTs every 6 months.  Continue Keppra 750 mg twice daily and Vimpat 100 mg twice daily  -Appointment with neurology in 4/28

## 2025-03-25 NOTE — PROGRESS NOTES
Division: Geriatrics  Date of Service: 3/25/2025  Visit Type: Long-Term Care Regulatory Follow-up Visit      Chief Complaint: No chief complaint on file.; 60 day visit    Subjective   History obtained from patient,  facility staff (nurse, aid, PT/OT/SLP, JOSE and/or DON), and medical records.     HPI   Hospitalization/ER visits: None  Memory: No significant change  Mood changes: Denies worsening of mood since being off Lexapro on 2/5  Sleep: No issues reported  Falls: None  Med Changes/OTC meds: Lexapro DC'd on 2/5 to reduce pill burden.  Pain: Has been complaining of bilateral leg pain.  Of note, patient rarely gets out of bed.  Always lying down in bed.    BM regular  Appetite: Fair  Weight: Reports showed weight loss from 200 pounds in 3/1 to 178 pounds yesterday.  Per patient, this is mostly not accurate.  She stated she was not balanced when she was weighed yesterday.  Denies decreased appetite.    Blood pressures have been ranging from 90s to 140s over 50s to 70s.    Chronic bradycardia-heart rate has been between mid 40s to 70s.  Continues to be asymptomatic.    Medical Hx reviewed. See below.   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    Cholesterol serum decreased    Encounter for gynecological examination (general) (routine) without abnormal findings 10/17/2016    Encounter for cervical Pap smear with pelvic exam    Encounter for screening for malignant neoplasm of  cervix     Encounter for Papanicolaou smear for cervical cancer screening    Encounter for screening for malignant neoplasm of colon 06/23/2015    Encounter for screening colonoscopy    Erythema intertrigo 10/17/2016    Intertrigo    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    Gastroesophageal reflux disease without esophagitis 10/26/2023    Glycosuria 11/03/2021    Sugar urinary present    History of abdominal abscess 10/26/2023    Hyperglycemia 11/03/2023    Hypomagnesemia 12/04/2024    Intertrigo 11/03/2023    Intra-abdominal abscess (Multi) 11/03/2023    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    Lichen sclerosus et atrophicus 11/03/2023    Major depressive disorder 10/26/2023    Malignant neoplasm of thyroid gland (Multi) 06/13/2013    Malignant neoplasm of thyroid gland    NPH (normal pressure hydrocephalus) (Multi) 11/03/2023    Osteoarthrosis, localized, primary, knee 11/03/2023    Other conditions influencing health status 07/15/2015    History of cough    Other disorders of pituitary gland     Empty sella syndrome    Other specified cough 03/16/2020    Upper airway cough syndrome    Other specified personal risk factors, not elsewhere classified 10/17/2016    Encounter for gynecologic examination for high-risk patient covered by Medicare    Other symptoms and signs involving the musculoskeletal system 10/12/2021    Weakness of both lower extremities    Pain in right hip 07/12/2021    Right hip pain    Pain in unspecified ankle and joints of unspecified foot 11/24/2014    Ankle pain    Pain in unspecified foot 11/24/2014    Heel pain    Pain in unspecified foot 08/02/2021    Foot pain    Pain in unspecified knee 12/03/2020    Knee pain    Pain in unspecified knee 11/04/2021    Joint pain, knee    Pain, unspecified 04/12/2022    Acute pain    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    Personal history of other diseases of the female genital tract 02/21/2017    History of breast pain     Personal history of other diseases of the nervous system and sense organs 02/25/2022    History of peripheral neuropathy    Personal history of other diseases of the nervous system and sense organs 11/24/2014    History of conjunctivitis    Personal history of other endocrine, nutritional and metabolic disease 06/29/2018    History of hyperglycemia    Personal history of other endocrine, nutritional and metabolic disease 11/03/2021    History of hyperglycemia    Personal history of other endocrine, nutritional and metabolic disease     History of hyperlipidemia    Personal history of other medical treatment     History of screening mammography    Personal history of other medical treatment 02/05/2020    History of screening mammography    Personal history of other specified conditions 04/24/2018    History of urinary frequency    Personal history of other specified conditions 04/28/2020    History of convulsions    Personal history of other specified conditions 10/12/2021    History of gait disorder    Personal history of urinary (tract) infections 09/05/2019    History of urinary tract infection    Pes planus 11/03/2023    Physical deconditioning 10/26/2023    Poor balance 11/03/2023    Prediabetes 09/05/2019    Prediabetes    Prediabetes 10/08/2021    Pre-diabetes    Pulmonary embolism 11/03/2023    Trochanteric bursitis of right hip 11/03/2023    Unspecified convulsions (Multi)     Seizures    Unspecified fracture of left toe(s), initial encounter for closed fracture 08/24/2021    Toe fracture, left    Unspecified injury of unspecified foot, initial encounter 04/12/2021    Toe injury     Past Surgical History:   Procedure Laterality Date    KNEE SURGERY  08/23/2013    Knee Surgery Left    MR HEAD ANGIO WO IV CONTRAST  10/10/2020    MR HEAD ANGIO WO IV CONTRAST 10/10/2020 [facility]    MR NECK ANGIO WO IV CONTRAST  10/10/2020    MR NECK ANGIO WO IV CONTRAST 10/10/2020 [facility]    OTHER  SURGICAL HISTORY  02/25/2022    Neck surgery    TONSILLECTOMY  08/23/2013    Tonsillectomy    TOTAL THYROIDECTOMY  10/21/2013    Thyroid Surgery Total Thyroidectomy    TUBAL LIGATION  08/23/2013    Tubal Ligation    US GUIDED PERCUTANEOUS PERITONEAL OR RETROPERITONEAL FLUID COLLECTION DRAINAGE  8/1/2022    US GUIDED PERCUTANEOUS PERITONEAL OR RETROPERITONEAL FLUID COLLECTION DRAINAGE 8/1/2022 [facility]     Family History   Problem Relation Name Age of Onset    Memory loss Mother      Heart disease Father      Hyperlipidemia Father      Depression Daughter      Autism Son      Depression Son      Diabetes Maternal Grandmother       Social History     Tobacco Use    Smoking status: Never     Passive exposure: Never    Smokeless tobacco: Never   Substance Use Topics    Alcohol use: Not Currently       Allergies: Ciprofloxacin, Dilantin [phenytoin sodium extended], Codeine, Naproxen, and Phenytoin    Medications reviewed and reconciled.   Current Outpatient Medications   Medication Sig Dispense Refill    acetaminophen (Tylenol) 325 mg tablet Take 2 tablets (650 mg) by mouth every 4 hours if needed. DO NOT EXCEED 3000 MG IN 24 HOURS      carboxymethylcellulose (Refresh Plus) 0.5 % ophthalmic solution Administer 1 drop into both eyes 4 times a day.      cholecalciferol (Vitamin D-3) 25 MCG (1000 UT) tablet Take 4 tablets (4,000 Units) by mouth once daily.      eucerin cream Apply topically 2 times a day. To affected areas      lacosamide (Vimpat) 100 mg tablet Take 1 tablet (100 mg) by mouth every 12 hours. 60 tablet 5    levETIRAcetam (Keppra) 750 mg tablet Take 1 tablet (750 mg) by mouth 2 times a day.      levothyroxine (Synthroid) 88 mcg tablet Take 1 tablet (88 mcg) by mouth once daily.      lisinopril 5 mg tablet Take 1 tablet (5 mg) by mouth once daily.      pantoprazole (ProtoNix) 20 mg EC tablet Take 1 tablet (20 mg) by mouth once daily in the morning. Take before meals. Do not crush, chew, or split.       simvastatin (Zocor) 20 mg tablet Take 1 tablet (20 mg) by mouth once daily in the evening.       No current facility-administered medications for this visit.       Objective   /82   Pulse 63   Temp 36.2 °C (97.2 °F)   Resp 18   Wt 80.9 kg (178 lb 4.8 oz)   SpO2 97%   BMI 30.61 kg/m²   Physical Exam  Vitals reviewed.   Constitutional:       General: She is not in acute distress.     Comments: Sitting in bed, watching tv, on room air   HENT:      Head: Normocephalic and atraumatic.      Nose: Nose normal.      Mouth/Throat:      Mouth: Mucous membranes are moist.      Pharynx: Oropharynx is clear.   Pulmonary:      Effort: Pulmonary effort is normal.   Abdominal:      General: Abdomen is flat. Bowel sounds are normal.      Palpations: Abdomen is soft.   Musculoskeletal:         General: Normal range of motion.      Cervical back: Normal range of motion and neck supple.      Right lower leg: No edema.      Left lower leg: Edema (mild nonpitting) present.      Comments: B/l foot drop  Extension contracture of bilateral knees   Skin:     General: Skin is warm and dry.      Capillary Refill: Capillary refill takes less than 2 seconds.   Neurological:      General: No focal deficit present.      Mental Status: She is alert. Mental status is at baseline. She is disoriented.      Gait: Gait abnormal.      Comments: Cognitive impairment noted.   Psychiatric:         Mood and Affect: Mood normal.         Labs:  Most current labs reviewed: no recent labs ordered  Lab Results   Component Value Date    WBC 5.5 08/12/2022    HGB 8.8 (L) 08/12/2022    HCT 30.1 (L) 08/12/2022     (H) 08/12/2022     (H) 08/12/2022    LYMPHOPCT 9.7 08/03/2022    RBC 2.99 (L) 08/12/2022    MCHC 29.2 (L) 08/12/2022    RDW 13.9 08/12/2022     Lab Results   Component Value Date     08/12/2022    K 4.0 08/12/2022     08/12/2022    CO2 25 08/12/2022    BUN 9 08/12/2022    CREATININE 0.33 (L) 08/12/2022    GLUCOSE 110  "(H) 08/12/2022    CALCIUM 8.5 (L) 08/12/2022    PROT 6.3 (L) 07/30/2022    BILITOT 0.6 07/30/2022    ALKPHOS 51 07/30/2022    AST 52 (H) 07/30/2022    ALT 14 07/30/2022     Lab Results   Component Value Date    TSH 23.69 (H) 08/11/2022    TSH 0.71 12/23/2020    TSH 0.47 08/26/2019     Lab Results   Component Value Date    FREET4 0.70 (L) 08/11/2022    FREET4 0.72 (L) 08/11/2022    FREET4 1.46 12/23/2020     Lab Results   Component Value Date    [id number] 670 10/23/2021     Lab Results   Component Value Date    HGBA1C 6.6 (A) 03/26/2022    HGBA1C 6.0 (A) 11/06/2021    HGBA1C 6.2 (A) 09/24/2021     No results found for: \"VITD25\"     Imaging:  Most current imaging studies reviewed: no recent imaging studies ordered    Assessment/Plan    Problem List Items Addressed This Visit             ICD-10-CM    Hypothyroidism E03.9     -Continue levothyroxine 88 mcg daily.  Controlled on last lab.         Seizure disorder (Multi) G40.909     -Will need implant back level, CBC and LFTs every 6 months.  Continue Keppra 750 mg twice daily and Vimpat 100 mg twice daily  -Appointment with neurology in 4/28         Hyperlipidemia E78.5     -Labs in 3 months.  Contninue zocor 20mg nightly         Debility R53.81     - LTC appropriate         Prediabetes R73.03     -A1c recently has been 6.3%. unlikely going to convert to DM. Off carb control diet         TIA (transient ischemic attack) G45.9     -On simvastatin 20 mg daily. Reviewed records. Should be on ASA 81mg daily. Will restart.         Anxiety and depression F41.9, F32.A     -Controlled without Lexapro         Bradycardia R00.1     -Asymptomatic.  Not on any beta-blocker  -Chronic.  I suspect baseline heart rate when the rate is 40 to 50s.  When asleep around 30s.  -TSH has been normal.   - per previous care team, pt refused work up for this.   - EKG didn't show any concerning arrhythmia         Bilateral leg edema - Primary R60.0     -Recent venous duplex ultrasound negative for " DVT.  Probably secondary to immobility/dependent edema.         Dementia without behavioral disturbance, psychotic disturbance, mood disturbance, or anxiety F03.90     -Suspected to have NPH.  However, will not be a candidate for shunt secondary to debility per neurology         Primary hypertension I10     -Controlled.  Continue lisinopril 5 mg daily         Duodenal ulcer K26.9     - per hospitalization records, had hx of possible perforated duodenal ulcer in 2022. Continue pantoprazole 20mg daily indefinitely         History of pulmonary embolus (PE) Z86.711     -she had a hx of PE in 2021 and was on eliquis for 1 yr   Chronically has decreased mobility.  At risk for another PE/DVT-         Contracture of joint of lower leg M24.569     -Discussed with patient immobility causing the pain in legs (from contracture).  Has been referred to physical therapy for this.  Advised to get out of bed daily.  She states she will try         Weight loss R63.4     -Discussed with IDT team specially with dietitian            Discussed case with: patient,  facility staff (nurse, aid, PT/OT/SLP, SW and/or DON), and Geriatrics care team      Electronically signed by: Nguyen Ring MD

## 2025-04-21 ENCOUNTER — APPOINTMENT (OUTPATIENT)
Dept: ORTHOPEDIC SURGERY | Facility: HOSPITAL | Age: 80
End: 2025-04-21
Payer: MEDICARE

## 2025-04-28 ENCOUNTER — APPOINTMENT (OUTPATIENT)
Dept: NEUROLOGY | Facility: CLINIC | Age: 80
End: 2025-04-28
Payer: MEDICARE

## 2025-04-28 VITALS — HEIGHT: 66 IN | WEIGHT: 199 LBS | BODY MASS INDEX: 31.98 KG/M2

## 2025-04-28 DIAGNOSIS — M47.12 MYELOPATHY, SPONDYLOGENIC, CERVICAL: ICD-10-CM

## 2025-04-28 DIAGNOSIS — G40.909 SEIZURE DISORDER (MULTI): Primary | ICD-10-CM

## 2025-04-28 DIAGNOSIS — R41.9 NEUROCOGNITIVE DISORDER: ICD-10-CM

## 2025-04-28 DIAGNOSIS — R26.9 GAIT DISORDER: ICD-10-CM

## 2025-04-28 DIAGNOSIS — G91.2 NPH (NORMAL PRESSURE HYDROCEPHALUS) (MULTI): ICD-10-CM

## 2025-04-28 DIAGNOSIS — G45.9 TIA (TRANSIENT ISCHEMIC ATTACK): ICD-10-CM

## 2025-04-28 DIAGNOSIS — G20.C PARKINSONISM, UNSPECIFIED PARKINSONISM TYPE (MULTI): ICD-10-CM

## 2025-04-28 DIAGNOSIS — G93.89 CEREBRAL VENTRICULOMEGALY: ICD-10-CM

## 2025-04-28 DIAGNOSIS — G62.89 OTHER POLYNEUROPATHY: ICD-10-CM

## 2025-04-28 DIAGNOSIS — E11.42 DIABETIC PERIPHERAL NEUROPATHY (MULTI): ICD-10-CM

## 2025-04-28 PROCEDURE — 1160F RVW MEDS BY RX/DR IN RCRD: CPT | Performed by: PSYCHIATRY & NEUROLOGY

## 2025-04-28 PROCEDURE — 1159F MED LIST DOCD IN RCRD: CPT | Performed by: PSYCHIATRY & NEUROLOGY

## 2025-04-28 PROCEDURE — G8433 SCR FOR DEP NOT CPT DOC RSN: HCPCS | Performed by: PSYCHIATRY & NEUROLOGY

## 2025-04-28 PROCEDURE — 1036F TOBACCO NON-USER: CPT | Performed by: PSYCHIATRY & NEUROLOGY

## 2025-04-28 PROCEDURE — 99214 OFFICE O/P EST MOD 30 MIN: CPT | Performed by: PSYCHIATRY & NEUROLOGY

## 2025-04-28 RX ORDER — LEVETIRACETAM 750 MG/1
750 TABLET ORAL 2 TIMES DAILY
Qty: 180 TABLET | Refills: 3 | Status: SHIPPED | OUTPATIENT
Start: 2025-04-28 | End: 2026-04-28

## 2025-04-28 ASSESSMENT — ENCOUNTER SYMPTOMS
OCCASIONAL FEELINGS OF UNSTEADINESS: 0
LOSS OF SENSATION IN FEET: 0
DEPRESSION: 0

## 2025-04-28 NOTE — PROGRESS NOTES
Subjective     Shikha Soliz 79 y.o.  HPI    The patient did give permission to do her virtual visit.  Virtual or Telephone Consent    An interactive audio and video telecommunication system which permits real time communications between the patient (at the originating site) and provider (at the distant site) was utilized to provide this telehealth service.   Verbal consent was requested and obtained from Shikha Soliz on this date, 04/28/25 for a telehealth visit and the patient's location was confirmed at the time of the visit.     The patient and her  both attend the appointment today.  The patient has been essentially stable but her  feels that her strength is slightly worsened since I saw her last as she does use a Alana to get into her wheelchair.  The patient denies any new neurological problems.  The patient has had no further seizures and is compliant with her levetiracetam and lacosamide.  The patient has not had any recent labs.  I did check her OARRS report.      Review of Systems   All other systems reviewed and are negative.       Problem List[1]     Medical History[2]     Surgical History[3]     Social History     Socioeconomic History    Marital status:      Spouse name: Not on file    Number of children: Not on file    Years of education: Not on file    Highest education level: Not on file   Occupational History    Not on file   Tobacco Use    Smoking status: Never     Passive exposure: Never    Smokeless tobacco: Never   Substance and Sexual Activity    Alcohol use: Not Currently    Drug use: Never    Sexual activity: Not on file   Other Topics Concern    Not on file   Social History Narrative    Not on file     Social Drivers of Health     Financial Resource Strain: Not on file   Food Insecurity: Not on file   Transportation Needs: Not on file   Physical Activity: Not on file   Stress: Not on file   Social Connections: Not on file   Intimate Partner Violence: Not on file  "  Housing Stability: Not on file        Family History[4]     Current Outpatient Medications   Medication Instructions    acetaminophen (TYLENOL) 650 mg, Every 4 hours PRN    carboxymethylcellulose (Refresh Plus) 0.5 % ophthalmic solution 1 drop, 4 times daily    cholecalciferol (VITAMIN D-3) 4,000 Units, Daily    eucerin cream 2 times daily    lacosamide (VIMPAT) 100 mg, oral, Every 12 hours    levETIRAcetam (KEPPRA) 750 mg, 2 times daily    levothyroxine (Synthroid) 88 mcg tablet 1 tablet, Daily    lisinopril 5 mg, Daily    pantoprazole (PROTONIX) 20 mg, Daily before breakfast    simvastatin (Zocor) 20 mg tablet 1 tablet, Every evening        RX Allergies[5]     Objective  Ht 1.676 m (5' 6\")   Wt 90.3 kg (199 lb)   BMI 32.12 kg/m²    GENERAL APPEARANCE:  No distress, alert and cooperative.     MENTAL STATE:  Orientation was normal to time, place and person. Recent and remote memory was intact.  Attention span and concentration were normal. Language testing was normal for comprehension, repetition, expression, and naming. Calculation was intact. The patient could correctly interpret a picture, and copy a diagram. General fund of knowledge was intact.     CRANIAL NERVES:  Cranial nerves were normal.      CN 2- Visual Acuity  OD: 20/20 (corrected)   OS: 20/20 (corrected); visual fields full to confrontation.      CN 3, 4, 6-  Pupils round, 4 mm in diameter, equally reactive to light. No ptosis. EOMs normal alignment, full range of movement, no nystagmus     CN 5- Facial sensation intact bilaterally. Normal corneal reflexes.      CN 7- Normal and symmetric facial strength. Nasolabial folds symmetric.     CN 8- Hearing intact to finger rub, whisper.      CN 9- Palate elevates symmetrically. Normal gag reflex.      CN 11- Normal strength of shoulder shrug and neck turning      CN 12- Tongue midline, with normal bulk and strength; no fasciculations.     MOTOR:  The patient's motor strength in the upper extremities is " 5-/5 and in the lower extremities is 4+/5 proximally and 4/5 distally.     GAIT: The patient's station and gait were not tested as she is currently in a wheelchair.    Assessment/Plan   The patient is essentially stable to slightly worse from a neurological standpoint.  The patient does need to try to stay as active as she can.  The patient should continue to take her Keppra and Vimpat.  I did refill both her Keppra.  The patient needs Keppra and lacosamide levels.  The patient needs a CBC and liver function test done every 6 months while on these medicines.  The patient needs to get at least 8 hours sleep a night and avoid excessive alcohol intake.  The patient certainly continue physical and occupational therapy.  The patient needs to stay well-hydrated.  The patient should continue aspirin and stroke risk factor modification.  I discussed all these issues in detail with the patient and her  and answered all their questions   The patient will follow up with me in 6 months.   I spent 10 minutes with the patient.       [1]   Patient Active Problem List  Diagnosis    Hypothyroidism    Seizure disorder (Multi)    Hyperlipidemia    Debility    Primary osteoarthritis involving multiple joints    Gait disorder    Adjustment disorder with depressed mood    Diabetic peripheral neuropathy (Multi)    Cervical stenosis of spine    Lumbar stenosis    Myelopathy, spondylogenic, cervical    Mild vitamin D deficiency    Neurocognitive disorder    Parkinsonism (Multi)    Peripheral neuropathy    Prediabetes    Primary osteoarthritis of both knees    S/P cervical spinal fusion    Temporal lobe epilepsy (Multi)    TIA (transient ischemic attack)    Upper airway cough syndrome    Weakness of both lower extremities    Anxiety and depression    Cerebral ventriculomegaly    Bradycardia    Bilateral leg edema    Dementia without behavioral disturbance, psychotic disturbance, mood disturbance, or anxiety    Vitamin B12 deficiency     Primary hypertension    Duodenal ulcer    Advance care planning    History of pulmonary embolus (PE)    Contracture of joint of lower leg    Weight loss   [2]   Past Medical History:  Diagnosis Date    Abrasion, left lesser toe(s), initial encounter 08/03/2021    Abrasion of toe of left foot, initial encounter    Acquired stenosis of right nasolacrimal duct 11/03/2023    ASCUS with positive high risk HPV cervical 11/03/2023    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    Chronic dacryocystitis of right lacrimal sac 11/03/2023    Colon polyps 11/03/2023    Convulsions (Multi) 11/03/2023    Decreased white blood cell count, unspecified 05/05/2020    WBC decreased    Disorder of lipoprotein metabolism, 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    Cholesterol serum decreased    Encounter for gynecological examination (general) (routine) without abnormal findings 10/17/2016    Encounter for cervical Pap smear with pelvic exam    Encounter for screening for malignant neoplasm of cervix     Encounter for Papanicolaou smear for cervical cancer screening    Encounter for screening for malignant neoplasm of colon 06/23/2015    Encounter for screening colonoscopy    Erythema intertrigo 10/17/2016    Intertrigo    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    Gastroesophageal reflux disease without esophagitis 10/26/2023    Glycosuria 11/03/2021    Sugar urinary present    History of abdominal abscess 10/26/2023    Hyperglycemia 11/03/2023    Hypomagnesemia 12/04/2024    Intertrigo 11/03/2023    Intra-abdominal abscess (Multi) 11/03/2023    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    Lichen sclerosus et atrophicus 11/03/2023    Major depressive disorder 10/26/2023    Malignant neoplasm of thyroid gland (Multi) 06/13/2013    Malignant neoplasm of thyroid gland    NPH (normal pressure hydrocephalus) (Multi) 11/03/2023    Osteoarthrosis, localized, primary, knee 11/03/2023    Other conditions influencing health status  07/15/2015    History of cough    Other disorders of pituitary gland     Empty sella syndrome    Other specified cough 03/16/2020    Upper airway cough syndrome    Other specified personal risk factors, not elsewhere classified 10/17/2016    Encounter for gynecologic examination for high-risk patient covered by Medicare    Other symptoms and signs involving the musculoskeletal system 10/12/2021    Weakness of both lower extremities    Pain in right hip 07/12/2021    Right hip pain    Pain in unspecified ankle and joints of unspecified foot 11/24/2014    Ankle pain    Pain in unspecified foot 11/24/2014    Heel pain    Pain in unspecified foot 08/02/2021    Foot pain    Pain in unspecified knee 12/03/2020    Knee pain    Pain in unspecified knee 11/04/2021    Joint pain, knee    Pain, unspecified 04/12/2022    Acute pain    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    Personal history of other diseases of the female genital tract 02/21/2017    History of breast pain    Personal history of other diseases of the nervous system and sense organs 02/25/2022    History of peripheral neuropathy    Personal history of other diseases of the nervous system and sense organs 11/24/2014    History of conjunctivitis    Personal history of other endocrine, nutritional and metabolic disease 06/29/2018    History of hyperglycemia    Personal history of other endocrine, nutritional and metabolic disease 11/03/2021    History of hyperglycemia    Personal history of other endocrine, nutritional and metabolic disease     History of hyperlipidemia    Personal history of other medical treatment     History of screening mammography    Personal history of other medical treatment 02/05/2020    History of screening mammography    Personal history of other specified conditions 04/24/2018    History of urinary frequency    Personal history of other specified conditions 04/28/2020    History of convulsions    Personal history of other specified  conditions 10/12/2021    History of gait disorder    Personal history of urinary (tract) infections 09/05/2019    History of urinary tract infection    Pes planus 11/03/2023    Physical deconditioning 10/26/2023    Poor balance 11/03/2023    Prediabetes 09/05/2019    Prediabetes    Prediabetes 10/08/2021    Pre-diabetes    Pulmonary embolism 11/03/2023    Trochanteric bursitis of right hip 11/03/2023    Unspecified convulsions (Multi)     Seizures    Unspecified fracture of left toe(s), initial encounter for closed fracture 08/24/2021    Toe fracture, left    Unspecified injury of unspecified foot, initial encounter 04/12/2021    Toe injury   [3]   Past Surgical History:  Procedure Laterality Date    KNEE SURGERY  08/23/2013    Knee Surgery Left    MR HEAD ANGIO WO IV CONTRAST  10/10/2020    MR HEAD ANGIO WO IV CONTRAST 10/10/2020 [facility]    MR NECK ANGIO WO IV CONTRAST  10/10/2020    MR NECK ANGIO WO IV CONTRAST 10/10/2020 [facility]    OTHER SURGICAL HISTORY  02/25/2022    Neck surgery    TONSILLECTOMY  08/23/2013    Tonsillectomy    TOTAL THYROIDECTOMY  10/21/2013    Thyroid Surgery Total Thyroidectomy    TUBAL LIGATION  08/23/2013    Tubal Ligation    US GUIDED PERCUTANEOUS PERITONEAL OR RETROPERITONEAL FLUID COLLECTION DRAINAGE  8/1/2022    US GUIDED PERCUTANEOUS PERITONEAL OR RETROPERITONEAL FLUID COLLECTION DRAINAGE 8/1/2022 [facility]   [4]   Family History  Problem Relation Name Age of Onset    Memory loss Mother      Heart disease Father      Hyperlipidemia Father      Depression Daughter      Autism Son      Depression Son      Diabetes Maternal Grandmother     [5]   Allergies  Allergen Reactions    Ciprofloxacin Itching and Other     Eye inflammation    Used eye drops caused eye inflammation    eyedrop    Dilantin [Phenytoin Sodium Extended] Unknown    Codeine Dizziness, Nausea/vomiting, Other and Rash     Dyspepsia    Headaches    headache    Naproxen Hives and Rash     Phenytoin Rash

## 2025-04-28 NOTE — PATIENT INSTRUCTIONS
The patient is essentially stable to slightly worse from a neurological standpoint.  The patient does need to try to stay as active as she can.  The patient should continue to take her Keppra and Vimpat.  I did refill both her Keppra.  The patient needs Keppra and lacosamide levels.  The patient needs a CBC and liver function test done every 6 months while on these medicines.  The patient needs to get at least 8 hours sleep a night and avoid excessive alcohol intake.  The patient certainly continue physical and occupational therapy.  The patient needs to stay well-hydrated.  The patient should continue aspirin and stroke risk factor modification.  I discussed all these issues in detail with the patient and her  and answered all their questions   The patient will follow up with me in 6 months.   I spent 10 minutes with the patient.

## 2025-05-05 ENCOUNTER — NURSING HOME VISIT (OUTPATIENT)
Dept: POST ACUTE CARE | Facility: EXTERNAL LOCATION | Age: 80
End: 2025-05-05
Payer: MEDICARE

## 2025-05-05 ENCOUNTER — DOCUMENTATION (OUTPATIENT)
Dept: POST ACUTE CARE | Facility: EXTERNAL LOCATION | Age: 80
End: 2025-05-05

## 2025-05-05 DIAGNOSIS — R11.2 NAUSEA AND VOMITING, UNSPECIFIED VOMITING TYPE: ICD-10-CM

## 2025-05-05 DIAGNOSIS — N93.9 VAGINAL BLEEDING: ICD-10-CM

## 2025-05-05 DIAGNOSIS — R19.5 LOOSE STOOLS: Primary | ICD-10-CM

## 2025-05-05 PROCEDURE — 99309 SBSQ NF CARE MODERATE MDM 30: CPT | Performed by: NURSE PRACTITIONER

## 2025-05-05 NOTE — LETTER
Patient: Josefina Soliz  : 1945    Encounter Date: 2025        Division: Geriatrics  Date of Service: 25  Visit Type: Long-Term Care Acute Follow-up Visit      Chief Complaint: nausea/vomiting, loose stools, reported vaginal bleeding ; Acute visit    Subjective  History obtained from patient.     HPI     Pateint evaluated while lying in bed due to n/v, loose stools and reported diarrhea. Patient reports multiple episodes of n/v/d over the last 4 days. She reports once the symptoms started, she stopped eating. She is unsure if she had abdominal pain with these symptoms. She reports n/v/d resolved yesterday, she states she was able to tolerate breakfast and lunch today without issues. She denies any further known vaginal bleeding.     D/w nursing, reports no further episodes of bleeding    Medical Hx reviewed. See below.   Medical History[1]  Surgical History[2]  Family History[3]  Social History     Tobacco Use   • Smoking status: Never     Passive exposure: Never   • Smokeless tobacco: Never   Substance Use Topics   • Alcohol use: Not Currently       Allergies: Ciprofloxacin, Dilantin [phenytoin sodium extended], Codeine, Naproxen, and Phenytoin    Medications reviewed and reconciled.   Current Medications[4]    Objective    Physical Exam  Vitals reviewed.   Constitutional:       General: She is not in acute distress.     Appearance: She is not toxic-appearing.   HENT:      Head: Normocephalic.      Mouth/Throat:      Mouth: Mucous membranes are moist.      Pharynx: Oropharynx is clear.   Eyes:      Conjunctiva/sclera: Conjunctivae normal.   Cardiovascular:      Rate and Rhythm: Normal rate. Rhythm irregular.      Heart sounds: Normal heart sounds.   Pulmonary:      Effort: Pulmonary effort is normal.   Abdominal:      General: Bowel sounds are normal. There is no distension.      Palpations: Abdomen is soft.      Tenderness: There is no abdominal tenderness.   Musculoskeletal:         General: Swelling  "present.      Cervical back: Normal range of motion.      Comments: BLE non pitting edema elevated on pillow    Skin:     General: Skin is warm and dry.   Neurological:      General: No focal deficit present.      Mental Status: She is alert.   Psychiatric:         Mood and Affect: Mood normal.         Labs:  Most current labs reviewed: see below for the results      Lab Results   Component Value Date    WBC 5.5 08/12/2022    HGB 8.8 (L) 08/12/2022    HCT 30.1 (L) 08/12/2022     (H) 08/12/2022     (H) 08/12/2022    LYMPHOPCT 9.7 08/03/2022    RBC 2.99 (L) 08/12/2022    MCHC 29.2 (L) 08/12/2022    RDW 13.9 08/12/2022     Lab Results   Component Value Date     08/12/2022    K 4.0 08/12/2022     08/12/2022    CO2 25 08/12/2022    BUN 9 08/12/2022    CREATININE 0.33 (L) 08/12/2022    GLUCOSE 110 (H) 08/12/2022    CALCIUM 8.5 (L) 08/12/2022    PROT 6.3 (L) 07/30/2022    BILITOT 0.6 07/30/2022    ALKPHOS 51 07/30/2022    AST 52 (H) 07/30/2022    ALT 14 07/30/2022     Lab Results   Component Value Date    TSH 23.69 (H) 08/11/2022    TSH 0.71 12/23/2020    TSH 0.47 08/26/2019     Lab Results   Component Value Date    FREET4 0.70 (L) 08/11/2022    FREET4 0.72 (L) 08/11/2022    FREET4 1.46 12/23/2020     Lab Results   Component Value Date    [id number] 670 10/23/2021     Lab Results   Component Value Date    HGBA1C 6.6 (A) 03/26/2022    HGBA1C 6.0 (A) 11/06/2021    HGBA1C 6.2 (A) 09/24/2021     No results found for: \"VITD25\"     Imaging:  Most current imaging studies reviewed: no recent imaging studies ordered    Assessment/Plan   Diagnoses and all orders for this visit:  Loose stools  Vaginal bleeding  Nausea and vomiting, unspecified vomiting type  -reports resolution in n/v/d   -tolerating oral intake without difficulty this am   -reported vaginal bleeding-patient and nurse deny further episodes  -monitor for bleeding   -hgb stable-labs reviewed     Discussed case with: patient    Previous medical " visit notes reviewed       Electronically signed by: SEVEN Turner         Electronically Signed By: SEVEN Turner   5/5/25  2:13 PM       [1]  Past Medical History:  Diagnosis Date   • Abrasion, left lesser toe(s), initial encounter 08/03/2021    Abrasion of toe of left foot, initial encounter   • Acquired stenosis of right nasolacrimal duct 11/03/2023   • ASCUS with positive high risk HPV cervical 11/03/2023   • 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   • Chronic dacryocystitis of right lacrimal sac 11/03/2023   • Colon polyps 11/03/2023   • Convulsions (Multi) 11/03/2023   • Decreased white blood cell count, unspecified 05/05/2020    WBC decreased   • Disorder of lipoprotein metabolism, 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    Cholesterol serum decreased   • Encounter for gynecological examination (general) (routine) without abnormal findings 10/17/2016    Encounter for cervical Pap smear with pelvic exam   • Encounter for screening for malignant neoplasm of cervix     Encounter for Papanicolaou smear for cervical cancer screening   • Encounter for screening for malignant neoplasm of colon 06/23/2015    Encounter for screening colonoscopy   • Erythema intertrigo 10/17/2016    Intertrigo   • 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   • Gastroesophageal reflux disease without esophagitis 10/26/2023   • Glycosuria 11/03/2021    Sugar urinary present   • History of abdominal abscess 10/26/2023   • Hyperglycemia 11/03/2023   • Hypomagnesemia 12/04/2024   • Intertrigo 11/03/2023   • Intra-abdominal abscess (Multi) 11/03/2023   • 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   • Lichen sclerosus et atrophicus 11/03/2023   • Major depressive disorder 10/26/2023   • Malignant neoplasm of thyroid gland (Multi) 06/13/2013    Malignant neoplasm of thyroid gland   • NPH (normal pressure hydrocephalus) (Multi) 11/03/2023   • Osteoarthrosis, localized, primary, knee 11/03/2023   • Other conditions  influencing health status 07/15/2015    History of cough   • Other disorders of pituitary gland     Empty sella syndrome   • Other specified cough 03/16/2020    Upper airway cough syndrome   • Other specified personal risk factors, not elsewhere classified 10/17/2016    Encounter for gynecologic examination for high-risk patient covered by Medicare   • Other symptoms and signs involving the musculoskeletal system 10/12/2021    Weakness of both lower extremities   • Pain in right hip 07/12/2021    Right hip pain   • Pain in unspecified ankle and joints of unspecified foot 11/24/2014    Ankle pain   • Pain in unspecified foot 11/24/2014    Heel pain   • Pain in unspecified foot 08/02/2021    Foot pain   • Pain in unspecified knee 12/03/2020    Knee pain   • Pain in unspecified knee 11/04/2021    Joint pain, knee   • Pain, unspecified 04/12/2022    Acute pain   • 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   • Personal history of other diseases of the female genital tract 02/21/2017    History of breast pain   • Personal history of other diseases of the nervous system and sense organs 02/25/2022    History of peripheral neuropathy   • Personal history of other diseases of the nervous system and sense organs 11/24/2014    History of conjunctivitis   • Personal history of other endocrine, nutritional and metabolic disease 06/29/2018    History of hyperglycemia   • Personal history of other endocrine, nutritional and metabolic disease 11/03/2021    History of hyperglycemia   • Personal history of other endocrine, nutritional and metabolic disease     History of hyperlipidemia   • Personal history of other medical treatment     History of screening mammography   • Personal history of other medical treatment 02/05/2020    History of screening mammography   • Personal history of other specified conditions 04/24/2018    History of urinary frequency   • Personal history of other specified conditions 04/28/2020    History of  convulsions   • Personal history of other specified conditions 10/12/2021    History of gait disorder   • Personal history of urinary (tract) infections 09/05/2019    History of urinary tract infection   • Pes planus 11/03/2023   • Physical deconditioning 10/26/2023   • Poor balance 11/03/2023   • Prediabetes 09/05/2019    Prediabetes   • Prediabetes 10/08/2021    Pre-diabetes   • Pulmonary embolism 11/03/2023   • Trochanteric bursitis of right hip 11/03/2023   • Unspecified convulsions (Multi)     Seizures   • Unspecified fracture of left toe(s), initial encounter for closed fracture 08/24/2021    Toe fracture, left   • Unspecified injury of unspecified foot, initial encounter 04/12/2021    Toe injury   [2]  Past Surgical History:  Procedure Laterality Date   • KNEE SURGERY  08/23/2013    Knee Surgery Left   • MR HEAD ANGIO WO IV CONTRAST  10/10/2020    MR HEAD ANGIO WO IV CONTRAST 10/10/2020 [facility]   • MR NECK ANGIO WO IV CONTRAST  10/10/2020    MR NECK ANGIO WO IV CONTRAST 10/10/2020 [facility]   • OTHER SURGICAL HISTORY  02/25/2022    Neck surgery   • TONSILLECTOMY  08/23/2013    Tonsillectomy   • TOTAL THYROIDECTOMY  10/21/2013    Thyroid Surgery Total Thyroidectomy   • TUBAL LIGATION  08/23/2013    Tubal Ligation   • US GUIDED PERCUTANEOUS PERITONEAL OR RETROPERITONEAL FLUID COLLECTION DRAINAGE  8/1/2022    US GUIDED PERCUTANEOUS PERITONEAL OR RETROPERITONEAL FLUID COLLECTION DRAINAGE 8/1/2022 [facility]GAC[facility]   [3]  Family History  Problem Relation Name Age of Onset   • Memory loss Mother     • Heart disease Father     • Hyperlipidemia Father     • Depression Daughter     • Autism Son     • Depression Son     • Diabetes Maternal Grandmother     [4]  Current Outpatient Medications   Medication Sig Dispense Refill   • acetaminophen (Tylenol) 325 mg tablet Take 2 tablets (650 mg) by mouth every 4 hours if needed. DO NOT EXCEED 3000 MG IN 24 HOURS     • carboxymethylcellulose (Refresh  Plus) 0.5 % ophthalmic solution Administer 1 drop into both eyes 4 times a day.     • cholecalciferol (Vitamin D-3) 25 MCG (1000 UT) tablet Take 4 tablets (4,000 Units) by mouth once daily.     • eucerin cream Apply topically 2 times a day. To affected areas     • lacosamide (Vimpat) 100 mg tablet Take 1 tablet (100 mg) by mouth every 12 hours. 60 tablet 5   • levETIRAcetam (Keppra) 750 mg tablet Take 1 tablet (750 mg) by mouth 2 times a day. 180 tablet 3   • levothyroxine (Synthroid) 88 mcg tablet Take 1 tablet (88 mcg) by mouth once daily.     • lisinopril 5 mg tablet Take 1 tablet (5 mg) by mouth once daily.     • pantoprazole (ProtoNix) 20 mg EC tablet Take 1 tablet (20 mg) by mouth once daily in the morning. Take before meals. Do not crush, chew, or split.     • simvastatin (Zocor) 20 mg tablet Take 1 tablet (20 mg) by mouth once daily in the evening.       No current facility-administered medications for this visit.

## 2025-05-05 NOTE — PROGRESS NOTES
Division: Geriatrics  Date of Service: 5/5/25  Visit Type: Long-Term Care Acute Follow-up Visit      Chief Complaint: nausea/vomiting, loose stools, reported vaginal bleeding ; Acute visit    Subjective   History obtained from patient.     HPI     Pateint evaluated while lying in bed due to n/v, loose stools and reported diarrhea. Patient reports multiple episodes of n/v/d over the last 4 days. She reports once the symptoms started, she stopped eating. She is unsure if she had abdominal pain with these symptoms. She reports n/v/d resolved yesterday, she states she was able to tolerate breakfast and lunch today without issues. She denies any further known vaginal bleeding.     D/w nursing, reports no further episodes of bleeding    Medical Hx reviewed. See below.   Medical History[1]  Surgical History[2]  Family History[3]  Social History     Tobacco Use    Smoking status: Never     Passive exposure: Never    Smokeless tobacco: Never   Substance Use Topics    Alcohol use: Not Currently       Allergies: Ciprofloxacin, Dilantin [phenytoin sodium extended], Codeine, Naproxen, and Phenytoin    Medications reviewed and reconciled.   Current Medications[4]    Objective     Physical Exam  Vitals reviewed.   Constitutional:       General: She is not in acute distress.     Appearance: She is not toxic-appearing.   HENT:      Head: Normocephalic.      Mouth/Throat:      Mouth: Mucous membranes are moist.      Pharynx: Oropharynx is clear.   Eyes:      Conjunctiva/sclera: Conjunctivae normal.   Cardiovascular:      Rate and Rhythm: Normal rate. Rhythm irregular.      Heart sounds: Normal heart sounds.   Pulmonary:      Effort: Pulmonary effort is normal.   Abdominal:      General: Bowel sounds are normal. There is no distension.      Palpations: Abdomen is soft.      Tenderness: There is no abdominal tenderness.   Musculoskeletal:         General: Swelling present.      Cervical back: Normal range of motion.      Comments:  "BLE non pitting edema elevated on pillow    Skin:     General: Skin is warm and dry.   Neurological:      General: No focal deficit present.      Mental Status: She is alert.   Psychiatric:         Mood and Affect: Mood normal.         Labs:  Most current labs reviewed: see below for the results      Lab Results   Component Value Date    WBC 5.5 08/12/2022    HGB 8.8 (L) 08/12/2022    HCT 30.1 (L) 08/12/2022     (H) 08/12/2022     (H) 08/12/2022    LYMPHOPCT 9.7 08/03/2022    RBC 2.99 (L) 08/12/2022    MCHC 29.2 (L) 08/12/2022    RDW 13.9 08/12/2022     Lab Results   Component Value Date     08/12/2022    K 4.0 08/12/2022     08/12/2022    CO2 25 08/12/2022    BUN 9 08/12/2022    CREATININE 0.33 (L) 08/12/2022    GLUCOSE 110 (H) 08/12/2022    CALCIUM 8.5 (L) 08/12/2022    PROT 6.3 (L) 07/30/2022    BILITOT 0.6 07/30/2022    ALKPHOS 51 07/30/2022    AST 52 (H) 07/30/2022    ALT 14 07/30/2022     Lab Results   Component Value Date    TSH 23.69 (H) 08/11/2022    TSH 0.71 12/23/2020    TSH 0.47 08/26/2019     Lab Results   Component Value Date    FREET4 0.70 (L) 08/11/2022    FREET4 0.72 (L) 08/11/2022    FREET4 1.46 12/23/2020     Lab Results   Component Value Date    [id number] 670 10/23/2021     Lab Results   Component Value Date    HGBA1C 6.6 (A) 03/26/2022    HGBA1C 6.0 (A) 11/06/2021    HGBA1C 6.2 (A) 09/24/2021     No results found for: \"VITD25\"     Imaging:  Most current imaging studies reviewed: no recent imaging studies ordered    Assessment/Plan    Diagnoses and all orders for this visit:  Loose stools  Vaginal bleeding  Nausea and vomiting, unspecified vomiting type  -reports resolution in n/v/d   -tolerating oral intake without difficulty this am   -reported vaginal bleeding-patient and nurse deny further episodes  -monitor for bleeding   -hgb stable-labs reviewed     Discussed case with: patient    Previous medical visit notes reviewed       Electronically signed by: Carolina DUCKWORTH" TRIPP Saab-CNP          [1]   Past Medical History:  Diagnosis Date    Abrasion, left lesser toe(s), initial encounter 08/03/2021    Abrasion of toe of left foot, initial encounter    Acquired stenosis of right nasolacrimal duct 11/03/2023    ASCUS with positive high risk HPV cervical 11/03/2023    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    Chronic dacryocystitis of right lacrimal sac 11/03/2023    Colon polyps 11/03/2023    Convulsions (Multi) 11/03/2023    Decreased white blood cell count, unspecified 05/05/2020    WBC decreased    Disorder of lipoprotein metabolism, 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    Cholesterol serum decreased    Encounter for gynecological examination (general) (routine) without abnormal findings 10/17/2016    Encounter for cervical Pap smear with pelvic exam    Encounter for screening for malignant neoplasm of cervix     Encounter for Papanicolaou smear for cervical cancer screening    Encounter for screening for malignant neoplasm of colon 06/23/2015    Encounter for screening colonoscopy    Erythema intertrigo 10/17/2016    Intertrigo    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    Gastroesophageal reflux disease without esophagitis 10/26/2023    Glycosuria 11/03/2021    Sugar urinary present    History of abdominal abscess 10/26/2023    Hyperglycemia 11/03/2023    Hypomagnesemia 12/04/2024    Intertrigo 11/03/2023    Intra-abdominal abscess (Multi) 11/03/2023    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    Lichen sclerosus et atrophicus 11/03/2023    Major depressive disorder 10/26/2023    Malignant neoplasm of thyroid gland (Multi) 06/13/2013    Malignant neoplasm of thyroid gland    NPH (normal pressure hydrocephalus) (Multi) 11/03/2023    Osteoarthrosis, localized, primary, knee 11/03/2023    Other conditions influencing health status 07/15/2015    History of cough    Other disorders of pituitary gland     Empty sella syndrome    Other specified cough 03/16/2020    Upper  airway cough syndrome    Other specified personal risk factors, not elsewhere classified 10/17/2016    Encounter for gynecologic examination for high-risk patient covered by Medicare    Other symptoms and signs involving the musculoskeletal system 10/12/2021    Weakness of both lower extremities    Pain in right hip 07/12/2021    Right hip pain    Pain in unspecified ankle and joints of unspecified foot 11/24/2014    Ankle pain    Pain in unspecified foot 11/24/2014    Heel pain    Pain in unspecified foot 08/02/2021    Foot pain    Pain in unspecified knee 12/03/2020    Knee pain    Pain in unspecified knee 11/04/2021    Joint pain, knee    Pain, unspecified 04/12/2022    Acute pain    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    Personal history of other diseases of the female genital tract 02/21/2017    History of breast pain    Personal history of other diseases of the nervous system and sense organs 02/25/2022    History of peripheral neuropathy    Personal history of other diseases of the nervous system and sense organs 11/24/2014    History of conjunctivitis    Personal history of other endocrine, nutritional and metabolic disease 06/29/2018    History of hyperglycemia    Personal history of other endocrine, nutritional and metabolic disease 11/03/2021    History of hyperglycemia    Personal history of other endocrine, nutritional and metabolic disease     History of hyperlipidemia    Personal history of other medical treatment     History of screening mammography    Personal history of other medical treatment 02/05/2020    History of screening mammography    Personal history of other specified conditions 04/24/2018    History of urinary frequency    Personal history of other specified conditions 04/28/2020    History of convulsions    Personal history of other specified conditions 10/12/2021    History of gait disorder    Personal history of urinary (tract) infections 09/05/2019    History of urinary tract  infection    Pes planus 11/03/2023    Physical deconditioning 10/26/2023    Poor balance 11/03/2023    Prediabetes 09/05/2019    Prediabetes    Prediabetes 10/08/2021    Pre-diabetes    Pulmonary embolism 11/03/2023    Trochanteric bursitis of right hip 11/03/2023    Unspecified convulsions (Multi)     Seizures    Unspecified fracture of left toe(s), initial encounter for closed fracture 08/24/2021    Toe fracture, left    Unspecified injury of unspecified foot, initial encounter 04/12/2021    Toe injury   [2]   Past Surgical History:  Procedure Laterality Date    KNEE SURGERY  08/23/2013    Knee Surgery Left    MR HEAD ANGIO WO IV CONTRAST  10/10/2020    MR HEAD ANGIO WO IV CONTRAST 10/10/2020 [facility]    MR NECK ANGIO WO IV CONTRAST  10/10/2020    MR NECK ANGIO WO IV CONTRAST 10/10/2020 [facility]    OTHER SURGICAL HISTORY  02/25/2022    Neck surgery    TONSILLECTOMY  08/23/2013    Tonsillectomy    TOTAL THYROIDECTOMY  10/21/2013    Thyroid Surgery Total Thyroidectomy    TUBAL LIGATION  08/23/2013    Tubal Ligation    US GUIDED PERCUTANEOUS PERITONEAL OR RETROPERITONEAL FLUID COLLECTION DRAINAGE  8/1/2022    US GUIDED PERCUTANEOUS PERITONEAL OR RETROPERITONEAL FLUID COLLECTION DRAINAGE 8/1/2022 [facility]   [3]   Family History  Problem Relation Name Age of Onset    Memory loss Mother      Heart disease Father      Hyperlipidemia Father      Depression Daughter      Autism Son      Depression Son      Diabetes Maternal Grandmother     [4]   Current Outpatient Medications   Medication Sig Dispense Refill    acetaminophen (Tylenol) 325 mg tablet Take 2 tablets (650 mg) by mouth every 4 hours if needed. DO NOT EXCEED 3000 MG IN 24 HOURS      carboxymethylcellulose (Refresh Plus) 0.5 % ophthalmic solution Administer 1 drop into both eyes 4 times a day.      cholecalciferol (Vitamin D-3) 25 MCG (1000 UT) tablet Take 4 tablets (4,000 Units) by mouth once daily.      eucerin cream Apply  topically 2 times a day. To affected areas      lacosamide (Vimpat) 100 mg tablet Take 1 tablet (100 mg) by mouth every 12 hours. 60 tablet 5    levETIRAcetam (Keppra) 750 mg tablet Take 1 tablet (750 mg) by mouth 2 times a day. 180 tablet 3    levothyroxine (Synthroid) 88 mcg tablet Take 1 tablet (88 mcg) by mouth once daily.      lisinopril 5 mg tablet Take 1 tablet (5 mg) by mouth once daily.      pantoprazole (ProtoNix) 20 mg EC tablet Take 1 tablet (20 mg) by mouth once daily in the morning. Take before meals. Do not crush, chew, or split.      simvastatin (Zocor) 20 mg tablet Take 1 tablet (20 mg) by mouth once daily in the evening.       No current facility-administered medications for this visit.

## 2025-05-07 ENCOUNTER — NURSING HOME VISIT (OUTPATIENT)
Dept: POST ACUTE CARE | Facility: EXTERNAL LOCATION | Age: 80
End: 2025-05-07
Payer: MEDICARE

## 2025-05-07 DIAGNOSIS — K76.0 HEPATIC STEATOSIS: Primary | ICD-10-CM

## 2025-05-07 PROCEDURE — 99309 SBSQ NF CARE MODERATE MDM 30: CPT | Performed by: NURSE PRACTITIONER

## 2025-05-07 NOTE — LETTER
Patient: Josefina Soliz  : 1945    Encounter Date: 2025        Division: Geriatrics  Date of Service: 25  Visit Type: Long-Term Care Acute Follow-up Visit      Chief Complaint: nausea/vomiting, loose stools, reported vaginal bleeding ; Acute visit    Subjective  History obtained from patient.     HPI     Patient evaluated while lying in bed for follow up of n/v/d, ultrasound and lab results.     Patient is alert and answering questions appropriately. She reports she did not eat lunch due to not liking the food options, she denies any further nausea of vomiting. No reports of abnormal bleeding.     Lab and ultrasound results discussed with patient, patient would not like to pursue further workup for hepatic steatosis or vaginal bleeding at this time.     Medical Hx reviewed. See below.   Medical History[1]  Surgical History[2]  Family History[3]  Social History     Tobacco Use   • Smoking status: Never     Passive exposure: Never   • Smokeless tobacco: Never   Substance Use Topics   • Alcohol use: Not Currently       Allergies: Ciprofloxacin, Dilantin [phenytoin sodium extended], Codeine, Naproxen, and Phenytoin    Medications reviewed and reconciled.   Current Medications[4]    Objective        Physical Exam  Vitals reviewed.   Constitutional:       General: She is not in acute distress.     Appearance: She is not toxic-appearing.   HENT:      Head: Normocephalic.      Mouth/Throat:      Mouth: Mucous membranes are moist.      Pharynx: Oropharynx is clear.   Eyes:      Conjunctiva/sclera: Conjunctivae normal.   Cardiovascular:      Rate and Rhythm: Normal rate.      Heart sounds: Normal heart sounds.   Pulmonary:      Effort: Pulmonary effort is normal.      Comments: No tachypnea noted on assessment, no conversational dyspnea   Abdominal:      General: Bowel sounds are normal. There is no distension.      Palpations: Abdomen is soft.      Tenderness: There is no abdominal tenderness.   Musculoskeletal:  "     Cervical back: Normal range of motion.      Comments: BLE non pitting edema elevated on pillow    Skin:     General: Skin is warm and dry.   Neurological:      General: No focal deficit present.      Mental Status: She is alert.   Psychiatric:         Mood and Affect: Mood normal.         Labs:  Most current labs reviewed: see below for the results          Lab Results   Component Value Date    WBC 5.5 08/12/2022    HGB 8.8 (L) 08/12/2022    HCT 30.1 (L) 08/12/2022     (H) 08/12/2022     (H) 08/12/2022    LYMPHOPCT 9.7 08/03/2022    RBC 2.99 (L) 08/12/2022    MCHC 29.2 (L) 08/12/2022    RDW 13.9 08/12/2022     Lab Results   Component Value Date     08/12/2022    K 4.0 08/12/2022     08/12/2022    CO2 25 08/12/2022    BUN 9 08/12/2022    CREATININE 0.33 (L) 08/12/2022    GLUCOSE 110 (H) 08/12/2022    CALCIUM 8.5 (L) 08/12/2022    PROT 6.3 (L) 07/30/2022    BILITOT 0.6 07/30/2022    ALKPHOS 51 07/30/2022    AST 52 (H) 07/30/2022    ALT 14 07/30/2022     Lab Results   Component Value Date    TSH 23.69 (H) 08/11/2022    TSH 0.71 12/23/2020    TSH 0.47 08/26/2019     Lab Results   Component Value Date    FREET4 0.70 (L) 08/11/2022    FREET4 0.72 (L) 08/11/2022    FREET4 1.46 12/23/2020     Lab Results   Component Value Date    [id number] 670 10/23/2021     Lab Results   Component Value Date    HGBA1C 6.6 (A) 03/26/2022    HGBA1C 6.0 (A) 11/06/2021    HGBA1C 6.2 (A) 09/24/2021     No results found for: \"VITD25\"     Imaging:  Most current imaging studies reviewed: no recent imaging studies ordered        Assessment/Plan   #Hepatic steatosis   -denies further episodes of vaginal bleeding   -reported vaginal bleeding-patient and nurse deny further episodes  -monitor for bleeding   -hgb stable-labs reviewed   -LFTs WNL, does not want CT evaluation at this time.   -will continue to monitor LFTs q 6 months   -patient not interested in pursuing abnormal vaginal bleeding workup at this time "   -options discussed in depth with patient, all questions answered.     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   • Chronic dacryocystitis of right lacrimal sac 11/03/2023   • Colon polyps 11/03/2023   • Convulsions (Multi) 11/03/2023   • Decreased white blood cell count, unspecified 05/05/2020    WBC decreased   • Disorder of lipoprotein metabolism, 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    Cholesterol serum decreased   • Encounter for gynecological examination (general) (routine) without abnormal findings 10/17/2016    Encounter for cervical Pap smear with pelvic exam   • Encounter for screening for malignant neoplasm of cervix     Encounter for Papanicolaou smear for cervical cancer screening   • Encounter for screening for malignant neoplasm of colon 06/23/2015    Encounter for screening colonoscopy   • Erythema intertrigo 10/17/2016    Intertrigo   • 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   • Gastroesophageal reflux disease without esophagitis 10/26/2023   • Glycosuria 11/03/2021    Sugar urinary present   • History of abdominal abscess 10/26/2023   • Hyperglycemia 11/03/2023   • Hypomagnesemia 12/04/2024   • Intertrigo 11/03/2023   • Intra-abdominal abscess (Multi) 11/03/2023   • 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   • Lichen sclerosus et atrophicus 11/03/2023   • Major depressive disorder 10/26/2023   • Malignant neoplasm of thyroid gland (Multi) 06/13/2013    Malignant neoplasm of thyroid gland   • NPH (normal  pressure hydrocephalus) (Multi) 11/03/2023   • Osteoarthrosis, localized, primary, knee 11/03/2023   • Other conditions influencing health status 07/15/2015    History of cough   • Other disorders of pituitary gland     Empty sella syndrome   • Other specified cough 03/16/2020    Upper airway cough syndrome   • Other specified personal risk factors, not elsewhere classified 10/17/2016    Encounter for gynecologic examination for high-risk patient covered by Medicare   • Other symptoms and signs involving the musculoskeletal system 10/12/2021    Weakness of both lower extremities   • Pain in right hip 07/12/2021    Right hip pain   • Pain in unspecified ankle and joints of unspecified foot 11/24/2014    Ankle pain   • Pain in unspecified foot 11/24/2014    Heel pain   • Pain in unspecified foot 08/02/2021    Foot pain   • Pain in unspecified knee 12/03/2020    Knee pain   • Pain in unspecified knee 11/04/2021    Joint pain, knee   • Pain, unspecified 04/12/2022    Acute pain   • 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   • Personal history of other diseases of the female genital tract 02/21/2017    History of breast pain   • Personal history of other diseases of the nervous system and sense organs 02/25/2022    History of peripheral neuropathy   • Personal history of other diseases of the nervous system and sense organs 11/24/2014    History of conjunctivitis   • Personal history of other endocrine, nutritional and metabolic disease 06/29/2018    History of hyperglycemia   • Personal history of other endocrine, nutritional and metabolic disease 11/03/2021    History of hyperglycemia   • Personal history of other endocrine, nutritional and metabolic disease     History of hyperlipidemia   • Personal history of other medical treatment     History of screening mammography   • Personal history of other medical treatment 02/05/2020    History of screening mammography   • Personal history of other specified conditions  04/24/2018    History of urinary frequency   • Personal history of other specified conditions 04/28/2020    History of convulsions   • Personal history of other specified conditions 10/12/2021    History of gait disorder   • Personal history of urinary (tract) infections 09/05/2019    History of urinary tract infection   • Pes planus 11/03/2023   • Physical deconditioning 10/26/2023   • Poor balance 11/03/2023   • Prediabetes 09/05/2019    Prediabetes   • Prediabetes 10/08/2021    Pre-diabetes   • Pulmonary embolism 11/03/2023   • Trochanteric bursitis of right hip 11/03/2023   • Unspecified convulsions (Multi)     Seizures   • Unspecified fracture of left toe(s), initial encounter for closed fracture 08/24/2021    Toe fracture, left   • Unspecified injury of unspecified foot, initial encounter 04/12/2021    Toe injury   [2]  Past Surgical History:  Procedure Laterality Date   • KNEE SURGERY  08/23/2013    Knee Surgery Left   • MR HEAD ANGIO WO IV CONTRAST  10/10/2020    MR HEAD ANGIO WO IV CONTRAST 10/10/2020 [facility]   • MR NECK ANGIO WO IV CONTRAST  10/10/2020    MR NECK ANGIO WO IV CONTRAST 10/10/2020 [facility]   • OTHER SURGICAL HISTORY  02/25/2022    Neck surgery   • TONSILLECTOMY  08/23/2013    Tonsillectomy   • TOTAL THYROIDECTOMY  10/21/2013    Thyroid Surgery Total Thyroidectomy   • TUBAL LIGATION  08/23/2013    Tubal Ligation   • US GUIDED PERCUTANEOUS PERITONEAL OR RETROPERITONEAL FLUID COLLECTION DRAINAGE  8/1/2022    US GUIDED PERCUTANEOUS PERITONEAL OR RETROPERITONEAL FLUID COLLECTION DRAINAGE 8/1/2022 [facility]GAC[facility]   [3]  Family History  Problem Relation Name Age of Onset   • Memory loss Mother     • Heart disease Father     • Hyperlipidemia Father     • Depression Daughter     • Autism Son     • Depression Son     • Diabetes Maternal Grandmother     [4]  Current Outpatient Medications   Medication Sig Dispense Refill   • acetaminophen (Tylenol) 325 mg tablet Take 2  tablets (650 mg) by mouth every 4 hours if needed. DO NOT EXCEED 3000 MG IN 24 HOURS     • carboxymethylcellulose (Refresh Plus) 0.5 % ophthalmic solution Administer 1 drop into both eyes 4 times a day.     • cholecalciferol (Vitamin D-3) 25 MCG (1000 UT) tablet Take 4 tablets (4,000 Units) by mouth once daily.     • eucerin cream Apply topically 2 times a day. To affected areas     • lacosamide (Vimpat) 100 mg tablet Take 1 tablet (100 mg) by mouth every 12 hours. 60 tablet 5   • levETIRAcetam (Keppra) 750 mg tablet Take 1 tablet (750 mg) by mouth 2 times a day. 180 tablet 3   • levothyroxine (Synthroid) 88 mcg tablet Take 1 tablet (88 mcg) by mouth once daily.     • lisinopril 5 mg tablet Take 1 tablet (5 mg) by mouth once daily.     • pantoprazole (ProtoNix) 20 mg EC tablet Take 1 tablet (20 mg) by mouth once daily in the morning. Take before meals. Do not crush, chew, or split.     • simvastatin (Zocor) 20 mg tablet Take 1 tablet (20 mg) by mouth once daily in the evening.       No current facility-administered medications for this visit.

## 2025-05-08 NOTE — PROGRESS NOTES
Division: Geriatrics  Date of Service: 5/5/25  Visit Type: Long-Term Care Acute Follow-up Visit      Chief Complaint: nausea/vomiting, loose stools, reported vaginal bleeding ; Acute visit    Subjective   History obtained from patient.     HPI     Patient evaluated while lying in bed for follow up of n/v/d, ultrasound and lab results.     Patient is alert and answering questions appropriately. She reports she did not eat lunch due to not liking the food options, she denies any further nausea of vomiting. No reports of abnormal bleeding.     Lab and ultrasound results discussed with patient, patient would not like to pursue further workup for hepatic steatosis or vaginal bleeding at this time.     Medical Hx reviewed. See below.   Medical History[1]  Surgical History[2]  Family History[3]  Social History     Tobacco Use    Smoking status: Never     Passive exposure: Never    Smokeless tobacco: Never   Substance Use Topics    Alcohol use: Not Currently       Allergies: Ciprofloxacin, Dilantin [phenytoin sodium extended], Codeine, Naproxen, and Phenytoin    Medications reviewed and reconciled.   Current Medications[4]    Objective         Physical Exam  Vitals reviewed.   Constitutional:       General: She is not in acute distress.     Appearance: She is not toxic-appearing.   HENT:      Head: Normocephalic.      Mouth/Throat:      Mouth: Mucous membranes are moist.      Pharynx: Oropharynx is clear.   Eyes:      Conjunctiva/sclera: Conjunctivae normal.   Cardiovascular:      Rate and Rhythm: Normal rate.      Heart sounds: Normal heart sounds.   Pulmonary:      Effort: Pulmonary effort is normal.      Comments: No tachypnea noted on assessment, no conversational dyspnea   Abdominal:      General: Bowel sounds are normal. There is no distension.      Palpations: Abdomen is soft.      Tenderness: There is no abdominal tenderness.   Musculoskeletal:      Cervical back: Normal range of motion.      Comments: BLE non  "pitting edema elevated on pillow    Skin:     General: Skin is warm and dry.   Neurological:      General: No focal deficit present.      Mental Status: She is alert.   Psychiatric:         Mood and Affect: Mood normal.         Labs:  Most current labs reviewed: see below for the results          Lab Results   Component Value Date    WBC 5.5 08/12/2022    HGB 8.8 (L) 08/12/2022    HCT 30.1 (L) 08/12/2022     (H) 08/12/2022     (H) 08/12/2022    LYMPHOPCT 9.7 08/03/2022    RBC 2.99 (L) 08/12/2022    MCHC 29.2 (L) 08/12/2022    RDW 13.9 08/12/2022     Lab Results   Component Value Date     08/12/2022    K 4.0 08/12/2022     08/12/2022    CO2 25 08/12/2022    BUN 9 08/12/2022    CREATININE 0.33 (L) 08/12/2022    GLUCOSE 110 (H) 08/12/2022    CALCIUM 8.5 (L) 08/12/2022    PROT 6.3 (L) 07/30/2022    BILITOT 0.6 07/30/2022    ALKPHOS 51 07/30/2022    AST 52 (H) 07/30/2022    ALT 14 07/30/2022     Lab Results   Component Value Date    TSH 23.69 (H) 08/11/2022    TSH 0.71 12/23/2020    TSH 0.47 08/26/2019     Lab Results   Component Value Date    FREET4 0.70 (L) 08/11/2022    FREET4 0.72 (L) 08/11/2022    FREET4 1.46 12/23/2020     Lab Results   Component Value Date    [id number] 670 10/23/2021     Lab Results   Component Value Date    HGBA1C 6.6 (A) 03/26/2022    HGBA1C 6.0 (A) 11/06/2021    HGBA1C 6.2 (A) 09/24/2021     No results found for: \"VITD25\"     Imaging:  Most current imaging studies reviewed: no recent imaging studies ordered        Assessment/Plan    #Hepatic steatosis   -denies further episodes of vaginal bleeding   -reported vaginal bleeding-patient and nurse deny further episodes  -monitor for bleeding   -hgb stable-labs reviewed   -LFTs WNL, does not want CT evaluation at this time.   -will continue to monitor LFTs q 6 months   -patient not interested in pursuing abnormal vaginal bleeding workup at this time   -options discussed in depth with patient, all questions answered. 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    Chronic dacryocystitis of right lacrimal sac 11/03/2023    Colon polyps 11/03/2023    Convulsions (Multi) 11/03/2023    Decreased white blood cell count, unspecified 05/05/2020    WBC decreased    Disorder of lipoprotein metabolism, 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    Cholesterol serum decreased    Encounter for gynecological examination (general) (routine) without abnormal findings 10/17/2016    Encounter for cervical Pap smear with pelvic exam    Encounter for screening for malignant neoplasm of cervix     Encounter for Papanicolaou smear for cervical cancer screening    Encounter for screening for malignant neoplasm of colon 06/23/2015    Encounter for screening colonoscopy    Erythema intertrigo 10/17/2016    Intertrigo    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    Gastroesophageal reflux disease without esophagitis 10/26/2023    Glycosuria 11/03/2021    Sugar urinary present    History of abdominal abscess 10/26/2023    Hyperglycemia 11/03/2023    Hypomagnesemia 12/04/2024    Intertrigo 11/03/2023    Intra-abdominal abscess (Multi) 11/03/2023    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    Lichen sclerosus et atrophicus 11/03/2023    Major depressive disorder 10/26/2023    Malignant neoplasm of thyroid gland (Multi) 06/13/2013    Malignant neoplasm of thyroid gland    NPH (normal pressure hydrocephalus) (Multi) 11/03/2023    Osteoarthrosis, localized, primary, knee 11/03/2023    Other conditions influencing health status 07/15/2015    History of  cough    Other disorders of pituitary gland     Empty sella syndrome    Other specified cough 03/16/2020    Upper airway cough syndrome    Other specified personal risk factors, not elsewhere classified 10/17/2016    Encounter for gynecologic examination for high-risk patient covered by Medicare    Other symptoms and signs involving the musculoskeletal system 10/12/2021    Weakness of both lower extremities    Pain in right hip 07/12/2021    Right hip pain    Pain in unspecified ankle and joints of unspecified foot 11/24/2014    Ankle pain    Pain in unspecified foot 11/24/2014    Heel pain    Pain in unspecified foot 08/02/2021    Foot pain    Pain in unspecified knee 12/03/2020    Knee pain    Pain in unspecified knee 11/04/2021    Joint pain, knee    Pain, unspecified 04/12/2022    Acute pain    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    Personal history of other diseases of the female genital tract 02/21/2017    History of breast pain    Personal history of other diseases of the nervous system and sense organs 02/25/2022    History of peripheral neuropathy    Personal history of other diseases of the nervous system and sense organs 11/24/2014    History of conjunctivitis    Personal history of other endocrine, nutritional and metabolic disease 06/29/2018    History of hyperglycemia    Personal history of other endocrine, nutritional and metabolic disease 11/03/2021    History of hyperglycemia    Personal history of other endocrine, nutritional and metabolic disease     History of hyperlipidemia    Personal history of other medical treatment     History of screening mammography    Personal history of other medical treatment 02/05/2020    History of screening mammography    Personal history of other specified conditions 04/24/2018    History of urinary frequency    Personal history of other specified conditions 04/28/2020    History of convulsions    Personal history of other specified conditions 10/12/2021     History of gait disorder    Personal history of urinary (tract) infections 09/05/2019    History of urinary tract infection    Pes planus 11/03/2023    Physical deconditioning 10/26/2023    Poor balance 11/03/2023    Prediabetes 09/05/2019    Prediabetes    Prediabetes 10/08/2021    Pre-diabetes    Pulmonary embolism 11/03/2023    Trochanteric bursitis of right hip 11/03/2023    Unspecified convulsions (Multi)     Seizures    Unspecified fracture of left toe(s), initial encounter for closed fracture 08/24/2021    Toe fracture, left    Unspecified injury of unspecified foot, initial encounter 04/12/2021    Toe injury   [2]   Past Surgical History:  Procedure Laterality Date    KNEE SURGERY  08/23/2013    Knee Surgery Left    MR HEAD ANGIO WO IV CONTRAST  10/10/2020    MR HEAD ANGIO WO IV CONTRAST 10/10/2020 [facility]    MR NECK ANGIO WO IV CONTRAST  10/10/2020    MR NECK ANGIO WO IV CONTRAST 10/10/2020 [facility]    OTHER SURGICAL HISTORY  02/25/2022    Neck surgery    TONSILLECTOMY  08/23/2013    Tonsillectomy    TOTAL THYROIDECTOMY  10/21/2013    Thyroid Surgery Total Thyroidectomy    TUBAL LIGATION  08/23/2013    Tubal Ligation    US GUIDED PERCUTANEOUS PERITONEAL OR RETROPERITONEAL FLUID COLLECTION DRAINAGE  8/1/2022    US GUIDED PERCUTANEOUS PERITONEAL OR RETROPERITONEAL FLUID COLLECTION DRAINAGE 8/1/2022 [facility]   [3]   Family History  Problem Relation Name Age of Onset    Memory loss Mother      Heart disease Father      Hyperlipidemia Father      Depression Daughter      Autism Son      Depression Son      Diabetes Maternal Grandmother     [4]   Current Outpatient Medications   Medication Sig Dispense Refill    acetaminophen (Tylenol) 325 mg tablet Take 2 tablets (650 mg) by mouth every 4 hours if needed. DO NOT EXCEED 3000 MG IN 24 HOURS      carboxymethylcellulose (Refresh Plus) 0.5 % ophthalmic solution Administer 1 drop into both eyes 4 times a day.      cholecalciferol  (Vitamin D-3) 25 MCG (1000 UT) tablet Take 4 tablets (4,000 Units) by mouth once daily.      eucerin cream Apply topically 2 times a day. To affected areas      lacosamide (Vimpat) 100 mg tablet Take 1 tablet (100 mg) by mouth every 12 hours. 60 tablet 5    levETIRAcetam (Keppra) 750 mg tablet Take 1 tablet (750 mg) by mouth 2 times a day. 180 tablet 3    levothyroxine (Synthroid) 88 mcg tablet Take 1 tablet (88 mcg) by mouth once daily.      lisinopril 5 mg tablet Take 1 tablet (5 mg) by mouth once daily.      pantoprazole (ProtoNix) 20 mg EC tablet Take 1 tablet (20 mg) by mouth once daily in the morning. Take before meals. Do not crush, chew, or split.      simvastatin (Zocor) 20 mg tablet Take 1 tablet (20 mg) by mouth once daily in the evening.       No current facility-administered medications for this visit.

## 2025-05-12 ENCOUNTER — APPOINTMENT (OUTPATIENT)
Dept: ORTHOPEDIC SURGERY | Facility: HOSPITAL | Age: 80
End: 2025-05-12
Payer: MEDICARE

## 2025-05-19 DIAGNOSIS — G40.909 SEIZURE DISORDER (MULTI): ICD-10-CM

## 2025-05-19 RX ORDER — LACOSAMIDE 100 MG/1
100 TABLET ORAL EVERY 12 HOURS
Qty: 60 TABLET | Refills: 2 | Status: SHIPPED | OUTPATIENT
Start: 2025-05-19 | End: 2025-08-17

## 2025-05-19 NOTE — PROGRESS NOTES
Per NH nurse, pt is need of refills of lacosamide Dx: seizure. OARSS/PDMP reviewed. No signs of abuse or misuse. Will refill.

## 2025-06-03 ENCOUNTER — NURSING HOME VISIT (OUTPATIENT)
Dept: POST ACUTE CARE | Facility: EXTERNAL LOCATION | Age: 80
End: 2025-06-03
Payer: MEDICARE

## 2025-06-03 DIAGNOSIS — I10 PRIMARY HYPERTENSION: ICD-10-CM

## 2025-06-03 DIAGNOSIS — G40.909 SEIZURE DISORDER (MULTI): ICD-10-CM

## 2025-06-03 DIAGNOSIS — E78.2 MIXED HYPERLIPIDEMIA: ICD-10-CM

## 2025-06-03 DIAGNOSIS — R60.0 BILATERAL LEG EDEMA: ICD-10-CM

## 2025-06-03 DIAGNOSIS — F32.A ANXIETY AND DEPRESSION: ICD-10-CM

## 2025-06-03 DIAGNOSIS — R53.81 DEBILITY: ICD-10-CM

## 2025-06-03 DIAGNOSIS — R73.03 PREDIABETES: ICD-10-CM

## 2025-06-03 DIAGNOSIS — K26.9 DUODENAL ULCER: ICD-10-CM

## 2025-06-03 DIAGNOSIS — K76.0 HEPATIC STEATOSIS: ICD-10-CM

## 2025-06-03 DIAGNOSIS — M24.569 CONTRACTURE OF JOINT OF LOWER LEG: ICD-10-CM

## 2025-06-03 DIAGNOSIS — R00.1 BRADYCARDIA: Primary | ICD-10-CM

## 2025-06-03 DIAGNOSIS — F41.9 ANXIETY AND DEPRESSION: ICD-10-CM

## 2025-06-03 DIAGNOSIS — N93.9 VAGINAL BLEEDING: ICD-10-CM

## 2025-06-03 PROCEDURE — G2211 COMPLEX E/M VISIT ADD ON: HCPCS | Performed by: INTERNAL MEDICINE

## 2025-06-03 PROCEDURE — 99309 SBSQ NF CARE MODERATE MDM 30: CPT | Performed by: INTERNAL MEDICINE

## 2025-06-03 NOTE — LETTER
Patient: Josefina Soliz  : 1945    Encounter Date: 2025      Division: Geriatrics  Date of Service: 6/3/2025  Visit Type: Long-Term Care Regulatory Follow-up Visit      Chief Complaint: Seizures; 60 day visit    Subjective  History obtained from patient,  facility staff (nurse, aid, PT/OT/SLP, SW and/or DON), medical records, and Geriatrics care team.    Seizures        Hospitalization/ER visits: None  Memory: Stabl. Patient updated on politics  Mood changes: Stable.  Patient admits she gets down when thinking about her previous home.  She states she had a wonderful home.  Sleep: No issues  Falls: None  Med Changes/OTC meds: No changes  Pain: Occasional pain in extremities secondary to immobility  BM: Regular, incontinent of bowels  Appetite: Good  Weight:no significant changes    BPs have been between 130s to 140s over 60s to 70s.  Heart rate have been high 30s to 60s, asymptomatic.    No interval seizures.  Patient compliant with taking meds.  Saw neurology virtually on .  Continues with Keppra and lacosamide.  Recommends CBC and LFT every 6 months.    No more episodes of bleeding.    Debility-receiving physical therapy.  Patient usually just prefers lying down in bed.    Medical Hx reviewed. See below.   Medical History[1]  Surgical History[2]  Family History[3]  Social History     Tobacco Use   • Smoking status: Never     Passive exposure: Never   • Smokeless tobacco: Never   Substance Use Topics   • Alcohol use: Not Currently       Allergies: Ciprofloxacin, Dilantin [phenytoin sodium extended], Codeine, Naproxen, and Phenytoin    Medications reviewed and reconciled.   Current Medications[4]    Objective  Wt 90.2 kg (198 lb 12.8 oz)   BMI 32.09 kg/m²     Physical Exam  Vitals reviewed.   Constitutional:       General: She is not in acute distress.     Appearance: She is not toxic-appearing.      Comments: Sitting in bed, watching tv, on room air   HENT:      Head: Normocephalic and atraumatic.       Nose: Nose normal.      Mouth/Throat:      Mouth: Mucous membranes are moist.      Pharynx: Oropharynx is clear.   Eyes:      Conjunctiva/sclera: Conjunctivae normal.   Cardiovascular:      Rate and Rhythm: Normal rate.      Heart sounds: Normal heart sounds.   Pulmonary:      Effort: Pulmonary effort is normal.      Breath sounds: Normal breath sounds.   Abdominal:      General: Abdomen is flat. Bowel sounds are normal. There is no distension.      Palpations: Abdomen is soft.      Tenderness: There is no abdominal tenderness.   Musculoskeletal:      Cervical back: Normal range of motion and neck supple.      Right lower leg: No edema.      Left lower leg: No edema.      Comments: Decreased ROM of BLE   Skin:     General: Skin is warm and dry.      Capillary Refill: Capillary refill takes less than 2 seconds.   Neurological:      General: No focal deficit present.      Mental Status: She is alert. Mental status is at baseline. She is disoriented.      Gait: Gait abnormal.      Comments: Mild Cognitive impairment noted.   Psychiatric:         Mood and Affect: Mood normal.         Labs:  Most current labs reviewed: see below for the results            Lab Results   Component Value Date    WBC 5.5 08/12/2022    HGB 8.8 (L) 08/12/2022    HCT 30.1 (L) 08/12/2022     (H) 08/12/2022     (H) 08/12/2022    LYMPHOPCT 9.7 08/03/2022    RBC 2.99 (L) 08/12/2022    MCHC 29.2 (L) 08/12/2022    RDW 13.9 08/12/2022     Lab Results   Component Value Date     08/12/2022    K 4.0 08/12/2022     08/12/2022    CO2 25 08/12/2022    BUN 9 08/12/2022    CREATININE 0.33 (L) 08/12/2022    GLUCOSE 110 (H) 08/12/2022    CALCIUM 8.5 (L) 08/12/2022    PROT 6.3 (L) 07/30/2022    BILITOT 0.6 07/30/2022    ALKPHOS 51 07/30/2022    AST 52 (H) 07/30/2022    ALT 14 07/30/2022     Lab Results   Component Value Date    TSH 23.69 (H) 08/11/2022    TSH 0.71 12/23/2020    TSH 0.47 08/26/2019     Lab Results   Component Value Date  "   FREET4 0.70 (L) 08/11/2022    FREET4 0.72 (L) 08/11/2022    FREET4 1.46 12/23/2020     Lab Results   Component Value Date    [id number] 670 10/23/2021     Lab Results   Component Value Date    HGBA1C 6.6 (A) 03/26/2022    HGBA1C 6.0 (A) 11/06/2021    HGBA1C 6.2 (A) 09/24/2021     No results found for: \"VITD25\"     Imaging:  Most current imaging studies reviewed: no recent imaging studies ordered    Assessment/Plan   Problem List Items Addressed This Visit           ICD-10-CM    Seizure disorder (Multi) G40.909    - follows with dr. Garcia  - for lacosamide and levetiracetam levels  - continue lacosamide 100mg BID and levetiracetam 750mg BID  - controlled         Hyperlipidemia E78.5    - Will order lipid panel. Contninue simvastatin 20mg nightly         Debility R53.81    - on therapy         Prediabetes R73.03    -A1c recently has been 6.3%. unlikely going to convert to DM. Off carb control diet  - repeat A1C around 12/2025         Anxiety and depression F41.9, F32.A    -Controlled without Lexapro  - patient declines any medications. States depression/feeling down is fleeting and mild.         Bradycardia - Primary R00.1    -Asymptomatic.  Not on any beta-blocker  -Chronic.  I suspect baseline heart rate when the rate is 40 to 50s.  When asleep around 30s.  -TSH has been normal.   - per previous care team, pt refused work up for this.   - EKG didn't show any concerning arrhythmia         Bilateral leg edema R60.0    - intermittent, controlled         Primary hypertension I10    -Controlled.  Continue lisinopril 5 mg daily         Duodenal ulcer K26.9    - per hospitalization records, had hx of possible perforated duodenal ulcer in 2022. 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  Receiving therapy. Advised to get out of bed daily but rarely does it. Chronic even when she was " taking lexapro         Hepatic steatosis K76.0    On statin.   Pcounseld on increasing activity but patient remains bed bound despite receiving therapy         Vaginal bleeding N93.9    - resolved.  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   • Chronic dacryocystitis of right lacrimal sac 11/03/2023   • Colon polyps 11/03/2023   • Convulsions (Multi) 11/03/2023   • Decreased white blood cell count, unspecified 05/05/2020    WBC decreased   • Disorder of lipoprotein metabolism, 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    Cholesterol serum decreased   • Encounter for gynecological examination (general) (routine) without abnormal findings 10/17/2016    Encounter for cervical Pap smear with pelvic exam   • Encounter for screening for malignant neoplasm of cervix     Encounter for Papanicolaou smear for cervical cancer screening   • Encounter for screening for malignant neoplasm of colon 06/23/2015    Encounter for screening colonoscopy   • Erythema intertrigo 10/17/2016    Intertrigo   • 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   • Gastroesophageal reflux disease without esophagitis 10/26/2023   • Glycosuria 11/03/2021    Sugar urinary present   • History of abdominal abscess 10/26/2023   • Hyperglycemia 11/03/2023   • Hypomagnesemia 12/04/2024   • Intertrigo 11/03/2023   • Intra-abdominal abscess (Multi) 11/03/2023   • Left knee DJD  11/03/2023   • Lichen sclerosus et atrophicus 11/03/2023   • Major depressive disorder 10/26/2023   • Malignant neoplasm of thyroid gland (Multi) 06/13/2013    Malignant neoplasm of thyroid gland   • NPH (normal pressure hydrocephalus) (Multi) 11/03/2023   • Osteoarthrosis, localized, primary, knee 11/03/2023   • Other conditions influencing health status 07/15/2015    History of cough   • Other disorders of pituitary gland     Empty sella syndrome   • Other specified cough 03/16/2020    Upper airway cough syndrome   • Other specified personal risk factors, not elsewhere classified 10/17/2016    Encounter for gynecologic examination for high-risk patient covered by Medicare   • Other symptoms and signs involving the musculoskeletal system 10/12/2021    Weakness of both lower extremities   • Pain in right hip 07/12/2021    Right hip pain   • Pain in unspecified ankle and joints of unspecified foot 11/24/2014    Ankle pain   • Pain in unspecified foot 11/24/2014    Heel pain   • Pain in unspecified foot 08/02/2021    Foot pain   • Pain in unspecified knee 12/03/2020    Knee pain   • Pain in unspecified knee 11/04/2021    Joint pain, knee   • Pain, unspecified 04/12/2022    Acute pain   • 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   • Personal history of other diseases of the female genital tract 02/21/2017    History of breast pain   • Personal history of other diseases of the nervous system and sense organs 02/25/2022    History of peripheral neuropathy   • Personal history of other diseases of the nervous system and sense organs 11/24/2014    History of conjunctivitis   • Personal history of other endocrine, nutritional and metabolic disease 06/29/2018    History of hyperglycemia   • Personal history of other endocrine, nutritional and metabolic disease 11/03/2021    History of hyperglycemia   • Personal history of other endocrine, nutritional and metabolic disease     History of hyperlipidemia   • Personal history  of other medical treatment     History of screening mammography   • Personal history of other medical treatment 02/05/2020    History of screening mammography   • Personal history of other specified conditions 04/24/2018    History of urinary frequency   • Personal history of other specified conditions 04/28/2020    History of convulsions   • Personal history of other specified conditions 10/12/2021    History of gait disorder   • Personal history of urinary (tract) infections 09/05/2019    History of urinary tract infection   • Pes planus 11/03/2023   • Physical deconditioning 10/26/2023   • Poor balance 11/03/2023   • Prediabetes 09/05/2019    Prediabetes   • Prediabetes 10/08/2021    Pre-diabetes   • Pulmonary embolism 11/03/2023   • Trochanteric bursitis of right hip 11/03/2023   • Unspecified convulsions (Multi)     Seizures   • Unspecified fracture of left toe(s), initial encounter for closed fracture 08/24/2021    Toe fracture, left   • Unspecified injury of unspecified foot, initial encounter 04/12/2021    Toe injury   [2]  Past Surgical History:  Procedure Laterality Date   • KNEE SURGERY  08/23/2013    Knee Surgery Left   • MR HEAD ANGIO WO IV CONTRAST  10/10/2020    MR HEAD ANGIO WO IV CONTRAST 10/10/2020 [facility]   • MR NECK ANGIO WO IV CONTRAST  10/10/2020    MR NECK ANGIO WO IV CONTRAST 10/10/2020 [facility]   • OTHER SURGICAL HISTORY  02/25/2022    Neck surgery   • TONSILLECTOMY  08/23/2013    Tonsillectomy   • TOTAL THYROIDECTOMY  10/21/2013    Thyroid Surgery Total Thyroidectomy   • TUBAL LIGATION  08/23/2013    Tubal Ligation   • US GUIDED PERCUTANEOUS PERITONEAL OR RETROPERITONEAL FLUID COLLECTION DRAINAGE  8/1/2022    US GUIDED PERCUTANEOUS PERITONEAL OR RETROPERITONEAL FLUID COLLECTION DRAINAGE 8/1/2022 [facility]ICAL LEGACY   [3]  Family History  Problem Relation Name Age of Onset   • Memory loss Mother     • Heart disease Father     • Hyperlipidemia Father     • Depression  Daughter     • Autism Son     • Depression Son     • Diabetes Maternal Grandmother     [4]  Current Outpatient Medications   Medication Sig Dispense Refill   • acetaminophen (Tylenol) 325 mg tablet Take 2 tablets (650 mg) by mouth every 4 hours if needed. DO NOT EXCEED 3000 MG IN 24 HOURS     • carboxymethylcellulose (Refresh Plus) 0.5 % ophthalmic solution Administer 1 drop into both eyes 4 times a day.     • cholecalciferol (Vitamin D-3) 25 MCG (1000 UT) tablet Take 4 tablets (4,000 Units) by mouth once daily.     • eucerin cream Apply topically 2 times a day. To affected areas     • lacosamide (Vimpat) 100 mg tablet Take 1 tablet (100 mg) by mouth every 12 hours. 60 tablet 2   • levETIRAcetam (Keppra) 750 mg tablet Take 1 tablet (750 mg) by mouth 2 times a day. 180 tablet 3   • levothyroxine (Synthroid) 88 mcg tablet Take 1 tablet (88 mcg) by mouth once daily.     • lisinopril 5 mg tablet Take 1 tablet (5 mg) by mouth once daily.     • pantoprazole (ProtoNix) 20 mg EC tablet Take 1 tablet (20 mg) by mouth once daily in the morning. Take before meals. Do not crush, chew, or split.     • simvastatin (Zocor) 20 mg tablet Take 1 tablet (20 mg) by mouth once daily in the evening.       No current facility-administered medications for this visit.

## 2025-06-04 VITALS — BODY MASS INDEX: 32.09 KG/M2 | WEIGHT: 198.8 LBS

## 2025-06-04 PROBLEM — K76.0 HEPATIC STEATOSIS: Status: ACTIVE | Noted: 2025-06-04

## 2025-06-04 PROBLEM — N93.9 VAGINAL BLEEDING: Status: ACTIVE | Noted: 2025-06-04

## 2025-06-04 ASSESSMENT — ENCOUNTER SYMPTOMS: SEIZURES: 1

## 2025-06-04 NOTE — ASSESSMENT & PLAN NOTE
-A1c recently has been 6.3%. unlikely going to convert to DM. Off carb control diet  - repeat A1C around 12/2025

## 2025-06-04 NOTE — PROGRESS NOTES
Division: Geriatrics  Date of Service: 6/3/2025  Visit Type: Long-Term Care Regulatory Follow-up Visit      Chief Complaint: Seizures; 60 day visit    Subjective   History obtained from patient,  facility staff (nurse, aid, PT/OT/SLP, JOSE and/or DON), medical records, and Geriatrics care team.    Seizures        Hospitalization/ER visits: None  Memory: Stabl. Patient updated on politics  Mood changes: Stable.  Patient admits she gets down when thinking about her previous home.  She states she had a wonderful home.  Sleep: No issues  Falls: None  Med Changes/OTC meds: No changes  Pain: Occasional pain in extremities secondary to immobility  BM: Regular, incontinent of bowels  Appetite: Good  Weight:no significant changes    BPs have been between 130s to 140s over 60s to 70s.  Heart rate have been high 30s to 60s, asymptomatic.    No interval seizures.  Patient compliant with taking meds.  Saw neurology virtually on 4/28.  Continues with Keppra and lacosamide.  Recommends CBC and LFT every 6 months.    No more episodes of bleeding.    Debility-receiving physical therapy.  Patient usually just prefers lying down in bed.    Medical Hx reviewed. See below.   Medical History[1]  Surgical History[2]  Family History[3]  Social History     Tobacco Use    Smoking status: Never     Passive exposure: Never    Smokeless tobacco: Never   Substance Use Topics    Alcohol use: Not Currently       Allergies: Ciprofloxacin, Dilantin [phenytoin sodium extended], Codeine, Naproxen, and Phenytoin    Medications reviewed and reconciled.   Current Medications[4]    Objective   Wt 90.2 kg (198 lb 12.8 oz)   BMI 32.09 kg/m²     Physical Exam  Vitals reviewed.   Constitutional:       General: She is not in acute distress.     Appearance: She is not toxic-appearing.      Comments: Sitting in bed, watching tv, on room air   HENT:      Head: Normocephalic and atraumatic.      Nose: Nose normal.      Mouth/Throat:      Mouth: Mucous membranes  are moist.      Pharynx: Oropharynx is clear.   Eyes:      Conjunctiva/sclera: Conjunctivae normal.   Cardiovascular:      Rate and Rhythm: Normal rate.      Heart sounds: Normal heart sounds.   Pulmonary:      Effort: Pulmonary effort is normal.      Breath sounds: Normal breath sounds.   Abdominal:      General: Abdomen is flat. Bowel sounds are normal. There is no distension.      Palpations: Abdomen is soft.      Tenderness: There is no abdominal tenderness.   Musculoskeletal:      Cervical back: Normal range of motion and neck supple.      Right lower leg: No edema.      Left lower leg: No edema.      Comments: Decreased ROM of BLE   Skin:     General: Skin is warm and dry.      Capillary Refill: Capillary refill takes less than 2 seconds.   Neurological:      General: No focal deficit present.      Mental Status: She is alert. Mental status is at baseline. She is disoriented.      Gait: Gait abnormal.      Comments: Mild Cognitive impairment noted.   Psychiatric:         Mood and Affect: Mood normal.         Labs:  Most current labs reviewed: see below for the results            Lab Results   Component Value Date    WBC 5.5 08/12/2022    HGB 8.8 (L) 08/12/2022    HCT 30.1 (L) 08/12/2022     (H) 08/12/2022     (H) 08/12/2022    LYMPHOPCT 9.7 08/03/2022    RBC 2.99 (L) 08/12/2022    MCHC 29.2 (L) 08/12/2022    RDW 13.9 08/12/2022     Lab Results   Component Value Date     08/12/2022    K 4.0 08/12/2022     08/12/2022    CO2 25 08/12/2022    BUN 9 08/12/2022    CREATININE 0.33 (L) 08/12/2022    GLUCOSE 110 (H) 08/12/2022    CALCIUM 8.5 (L) 08/12/2022    PROT 6.3 (L) 07/30/2022    BILITOT 0.6 07/30/2022    ALKPHOS 51 07/30/2022    AST 52 (H) 07/30/2022    ALT 14 07/30/2022     Lab Results   Component Value Date    TSH 23.69 (H) 08/11/2022    TSH 0.71 12/23/2020    TSH 0.47 08/26/2019     Lab Results   Component Value Date    FREET4 0.70 (L) 08/11/2022    FREET4 0.72 (L) 08/11/2022     "FREET4 1.46 12/23/2020     Lab Results   Component Value Date    EHNSJWAD36 670 10/23/2021     Lab Results   Component Value Date    HGBA1C 6.6 (A) 03/26/2022    HGBA1C 6.0 (A) 11/06/2021    HGBA1C 6.2 (A) 09/24/2021     No results found for: \"VITD25\"     Imaging:  Most current imaging studies reviewed: no recent imaging studies ordered    Assessment/Plan    Problem List Items Addressed This Visit           ICD-10-CM    Seizure disorder (Multi) G40.909    - follows with dr. Garcia  - for lacosamide and levetiracetam levels  - continue lacosamide 100mg BID and levetiracetam 750mg BID  - controlled         Hyperlipidemia E78.5    - Will order lipid panel. Contninue simvastatin 20mg nightly         Debility R53.81    - on therapy         Prediabetes R73.03    -A1c recently has been 6.3%. unlikely going to convert to DM. Off carb control diet  - repeat A1C around 12/2025         Anxiety and depression F41.9, F32.A    -Controlled without Lexapro  - patient declines any medications. States depression/feeling down is fleeting and mild.         Bradycardia - Primary R00.1    -Asymptomatic.  Not on any beta-blocker  -Chronic.  I suspect baseline heart rate when the rate is 40 to 50s.  When asleep around 30s.  -TSH has been normal.   - per previous care team, pt refused work up for this.   - EKG didn't show any concerning arrhythmia         Bilateral leg edema R60.0    - intermittent, controlled         Primary hypertension I10    -Controlled.  Continue lisinopril 5 mg daily         Duodenal ulcer K26.9    - per hospitalization records, had hx of possible perforated duodenal ulcer in 2022. 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  Receiving therapy. Advised to get out of bed daily but rarely does it. Chronic even when she was taking lexapro         Hepatic steatosis K76.0    On statin. "   Pcounseld on increasing activity but patient remains bed bound despite receiving therapy         Vaginal bleeding N93.9    - resolved.  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    Chronic dacryocystitis of right lacrimal sac 11/03/2023    Colon polyps 11/03/2023    Convulsions (Multi) 11/03/2023    Decreased white blood cell count, unspecified 05/05/2020    WBC decreased    Disorder of lipoprotein metabolism, 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    Cholesterol serum decreased    Encounter for gynecological examination (general) (routine) without abnormal findings 10/17/2016    Encounter for cervical Pap smear with pelvic exam    Encounter for screening for malignant neoplasm of cervix     Encounter for Papanicolaou smear for cervical cancer screening    Encounter for screening for malignant neoplasm of colon 06/23/2015    Encounter for screening colonoscopy    Erythema intertrigo 10/17/2016    Intertrigo    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    Gastroesophageal reflux disease without esophagitis 10/26/2023    Glycosuria 11/03/2021    Sugar urinary present    History of abdominal abscess 10/26/2023    Hyperglycemia 11/03/2023    Hypomagnesemia 12/04/2024    Intertrigo 11/03/2023    Intra-abdominal abscess (Multi) 11/03/2023    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    Lichen sclerosus et atrophicus 11/03/2023    Major depressive disorder 10/26/2023    Malignant neoplasm of thyroid gland (Multi) 06/13/2013     Malignant neoplasm of thyroid gland    NPH (normal pressure hydrocephalus) (Multi) 11/03/2023    Osteoarthrosis, localized, primary, knee 11/03/2023    Other conditions influencing health status 07/15/2015    History of cough    Other disorders of pituitary gland     Empty sella syndrome    Other specified cough 03/16/2020    Upper airway cough syndrome    Other specified personal risk factors, not elsewhere classified 10/17/2016    Encounter for gynecologic examination for high-risk patient covered by Medicare    Other symptoms and signs involving the musculoskeletal system 10/12/2021    Weakness of both lower extremities    Pain in right hip 07/12/2021    Right hip pain    Pain in unspecified ankle and joints of unspecified foot 11/24/2014    Ankle pain    Pain in unspecified foot 11/24/2014    Heel pain    Pain in unspecified foot 08/02/2021    Foot pain    Pain in unspecified knee 12/03/2020    Knee pain    Pain in unspecified knee 11/04/2021    Joint pain, knee    Pain, unspecified 04/12/2022    Acute pain    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    Personal history of other diseases of the female genital tract 02/21/2017    History of breast pain    Personal history of other diseases of the nervous system and sense organs 02/25/2022    History of peripheral neuropathy    Personal history of other diseases of the nervous system and sense organs 11/24/2014    History of conjunctivitis    Personal history of other endocrine, nutritional and metabolic disease 06/29/2018    History of hyperglycemia    Personal history of other endocrine, nutritional and metabolic disease 11/03/2021    History of hyperglycemia    Personal history of other endocrine, nutritional and metabolic disease     History of hyperlipidemia    Personal history of other medical treatment     History of screening mammography    Personal history of other medical treatment 02/05/2020    History of screening mammography    Personal history of  other specified conditions 04/24/2018    History of urinary frequency    Personal history of other specified conditions 04/28/2020    History of convulsions    Personal history of other specified conditions 10/12/2021    History of gait disorder    Personal history of urinary (tract) infections 09/05/2019    History of urinary tract infection    Pes planus 11/03/2023    Physical deconditioning 10/26/2023    Poor balance 11/03/2023    Prediabetes 09/05/2019    Prediabetes    Prediabetes 10/08/2021    Pre-diabetes    Pulmonary embolism 11/03/2023    Trochanteric bursitis of right hip 11/03/2023    Unspecified convulsions (Multi)     Seizures    Unspecified fracture of left toe(s), initial encounter for closed fracture 08/24/2021    Toe fracture, left    Unspecified injury of unspecified foot, initial encounter 04/12/2021    Toe injury   [2]   Past Surgical History:  Procedure Laterality Date    KNEE SURGERY  08/23/2013    Knee Surgery Left    MR HEAD ANGIO WO IV CONTRAST  10/10/2020    MR HEAD ANGIO WO IV CONTRAST 10/10/2020 [facility]    MR NECK ANGIO WO IV CONTRAST  10/10/2020    MR NECK ANGIO WO IV CONTRAST 10/10/2020 [facility]    OTHER SURGICAL HISTORY  02/25/2022    Neck surgery    TONSILLECTOMY  08/23/2013    Tonsillectomy    TOTAL THYROIDECTOMY  10/21/2013    Thyroid Surgery Total Thyroidectomy    TUBAL LIGATION  08/23/2013    Tubal Ligation    US GUIDED PERCUTANEOUS PERITONEAL OR RETROPERITONEAL FLUID COLLECTION DRAINAGE  8/1/2022    US GUIDED PERCUTANEOUS PERITONEAL OR RETROPERITONEAL FLUID COLLECTION DRAINAGE 8/1/2022 [facility]   [3]   Family History  Problem Relation Name Age of Onset    Memory loss Mother      Heart disease Father      Hyperlipidemia Father      Depression Daughter      Autism Son      Depression Son      Diabetes Maternal Grandmother     [4]   Current Outpatient Medications   Medication Sig Dispense Refill    acetaminophen (Tylenol) 325 mg tablet Take 2  tablets (650 mg) by mouth every 4 hours if needed. DO NOT EXCEED 3000 MG IN 24 HOURS      carboxymethylcellulose (Refresh Plus) 0.5 % ophthalmic solution Administer 1 drop into both eyes 4 times a day.      cholecalciferol (Vitamin D-3) 25 MCG (1000 UT) tablet Take 4 tablets (4,000 Units) by mouth once daily.      eucerin cream Apply topically 2 times a day. To affected areas      lacosamide (Vimpat) 100 mg tablet Take 1 tablet (100 mg) by mouth every 12 hours. 60 tablet 2    levETIRAcetam (Keppra) 750 mg tablet Take 1 tablet (750 mg) by mouth 2 times a day. 180 tablet 3    levothyroxine (Synthroid) 88 mcg tablet Take 1 tablet (88 mcg) by mouth once daily.      lisinopril 5 mg tablet Take 1 tablet (5 mg) by mouth once daily.      pantoprazole (ProtoNix) 20 mg EC tablet Take 1 tablet (20 mg) by mouth once daily in the morning. Take before meals. Do not crush, chew, or split.      simvastatin (Zocor) 20 mg tablet Take 1 tablet (20 mg) by mouth once daily in the evening.       No current facility-administered medications for this visit.

## 2025-06-04 NOTE — ASSESSMENT & PLAN NOTE
-Controlled without Lexapro  - patient declines any medications. States depression/feeling down is fleeting and mild.

## 2025-06-04 NOTE — ASSESSMENT & PLAN NOTE
-Discussed with patient immobility causing the pain in legs (from contracture).  Receiving therapy. Advised to get out of bed daily but rarely does it. Chronic even when she was taking lexapro

## 2025-06-04 NOTE — ASSESSMENT & PLAN NOTE
- follows with dr. Garcia  - for lacosamide and levetiracetam levels  - continue lacosamide 100mg BID and levetiracetam 750mg BID  - controlled

## 2025-06-05 ENCOUNTER — PATIENT MESSAGE (OUTPATIENT)
Dept: POST ACUTE CARE | Facility: EXTERNAL LOCATION | Age: 80
End: 2025-06-05
Payer: MEDICARE

## 2025-06-19 DIAGNOSIS — G40.909 SEIZURE DISORDER (MULTI): ICD-10-CM

## 2025-06-19 RX ORDER — LACOSAMIDE 100 MG/1
100 TABLET ORAL EVERY 12 HOURS
Qty: 60 TABLET | Refills: 0 | Status: SHIPPED | OUTPATIENT
Start: 2025-06-19 | End: 2025-09-17

## 2025-07-29 ENCOUNTER — NURSING HOME VISIT (OUTPATIENT)
Dept: POST ACUTE CARE | Facility: EXTERNAL LOCATION | Age: 80
End: 2025-07-29
Payer: MEDICARE

## 2025-07-29 DIAGNOSIS — E78.2 MIXED HYPERLIPIDEMIA: ICD-10-CM

## 2025-07-29 DIAGNOSIS — K76.0 HEPATIC STEATOSIS: ICD-10-CM

## 2025-07-29 DIAGNOSIS — R53.81 DEBILITY: ICD-10-CM

## 2025-07-29 DIAGNOSIS — R00.1 BRADYCARDIA: ICD-10-CM

## 2025-07-29 DIAGNOSIS — F41.9 ANXIETY AND DEPRESSION: ICD-10-CM

## 2025-07-29 DIAGNOSIS — F32.A ANXIETY AND DEPRESSION: ICD-10-CM

## 2025-07-29 DIAGNOSIS — R60.0 BILATERAL LEG EDEMA: ICD-10-CM

## 2025-07-29 DIAGNOSIS — G40.909 SEIZURE DISORDER (MULTI): Primary | ICD-10-CM

## 2025-07-29 DIAGNOSIS — M24.569 CONTRACTURE OF JOINT OF LOWER LEG: ICD-10-CM

## 2025-07-29 DIAGNOSIS — R73.03 PREDIABETES: ICD-10-CM

## 2025-07-29 DIAGNOSIS — K26.9 DUODENAL ULCER: ICD-10-CM

## 2025-07-29 DIAGNOSIS — I10 PRIMARY HYPERTENSION: ICD-10-CM

## 2025-07-29 DIAGNOSIS — N89.8 VAGINAL IRRITATION: ICD-10-CM

## 2025-07-29 PROCEDURE — 99309 SBSQ NF CARE MODERATE MDM 30: CPT | Performed by: INTERNAL MEDICINE

## 2025-07-29 PROCEDURE — G2211 COMPLEX E/M VISIT ADD ON: HCPCS | Performed by: INTERNAL MEDICINE

## 2025-07-29 NOTE — LETTER
Patient: Josefina Soliz  : 1945    Encounter Date: 2025      Division: Geriatrics  Date of Service: 2025  Visit Type: Long-Term Care Regulatory Follow-up Visit    Chief Complaint: No chief complaint on file.; 60 day visit    Subjective  History obtained from patient.  Patient seen at bedside, denies chest pain shortness of breath nausea vomiting.  She was quite dismissive and denies that she has history of seizures. She said I never had seizures before.    Only reports bilateral lower extremity pain >> chronic    Patient participatory to me. She has no complaints. No interval seizures. Does not participate to activities or get out of her room/bed a lot.     HPI   Hospitalization/ER visits: None  Memory: Baseline  Mood changes: Baseline  Sleep: No reported changes  Falls: None  Pain: Bilateral lower extremity pain  BM good  Appetite: good  Weight: no significant changes       Medical Hx reviewed. See below.   Medical History[1]  Surgical History[2]  Family History[3]  Social History     Tobacco Use   • Smoking status: Never     Passive exposure: Never   • Smokeless tobacco: Never   Substance Use Topics   • Alcohol use: Not Currently       Allergies: Ciprofloxacin, Dilantin [phenytoin sodium extended], Codeine, Naproxen, and Phenytoin    Medications reviewed and reconciled.   Current Medications[4]    Objective  There were no vitals taken for this visit.  Vitals per [facility]:  BP: 166/63  HR: 35  RR: 22  T: 97  O2 Sat: 96%  Weight: 199.2     Physical Exam  Constitutional:       Appearance: Normal appearance.   HENT:      Head: Normocephalic and atraumatic.      Nose: Nose normal.     Cardiovascular:      Rate and Rhythm: Regular rhythm. Bradycardia present.   Pulmonary:      Effort: Pulmonary effort is normal.      Breath sounds: Normal breath sounds.   Abdominal:      General: Abdomen is flat.      Palpations: Abdomen is soft.     Musculoskeletal:         General: Signs of injury present.       "Right lower leg: Edema present.      Left lower leg: Edema present.     Skin:     General: Skin is warm.     Neurological:      Mental Status: She is alert. Mental status is at baseline.         Labs:  Most current labs reviewed: see below for the results  Lab Results   Component Value Date    WBC 5.5 08/12/2022    HGB 8.8 (L) 08/12/2022    HCT 30.1 (L) 08/12/2022     (H) 08/12/2022     (H) 08/12/2022    LYMPHOPCT 9.7 08/03/2022    RBC 2.99 (L) 08/12/2022    MCHC 29.2 (L) 08/12/2022    RDW 13.9 08/12/2022     Lab Results   Component Value Date     08/12/2022    K 4.0 08/12/2022     08/12/2022    CO2 25 08/12/2022    BUN 9 08/12/2022    CREATININE 0.33 (L) 08/12/2022    GLUCOSE 110 (H) 08/12/2022    CALCIUM 8.5 (L) 08/12/2022    PROT 6.3 (L) 07/30/2022    BILITOT 0.6 07/30/2022    ALKPHOS 51 07/30/2022    AST 52 (H) 07/30/2022    ALT 14 07/30/2022     Lab Results   Component Value Date    TSH 23.69 (H) 08/11/2022    TSH 0.71 12/23/2020    TSH 0.47 08/26/2019     Lab Results   Component Value Date    FREET4 0.70 (L) 08/11/2022    FREET4 0.72 (L) 08/11/2022    FREET4 1.46 12/23/2020     Lab Results   Component Value Date    GOEJWVCY93 670 10/23/2021     Lab Results   Component Value Date    HGBA1C 6.6 (A) 03/26/2022    HGBA1C 6.0 (A) 11/06/2021    HGBA1C 6.2 (A) 09/24/2021     No results found for: \"VITD25\"     Imaging:  Most current imaging studies reviewed: see below for the results    Assessment/Plan   Problem List Items Addressed This Visit           ICD-10-CM    Seizure disorder (Multi) - Primary G40.909    Hyperlipidemia E78.5    Debility R53.81    Prediabetes R73.03    Anxiety and depression F41.9, F32.A    Bradycardia R00.1    Bilateral leg edema R60.0    Primary hypertension I10    Duodenal ulcer K26.9    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 Garcia  - for lacosamide and levetiracetam levels  - " continue lacosamide 100mg BID and levetiracetam 750mg BID  - controlled           Hyperlipidemia      -Contninue simvastatin 20mg nightly           Debility      - on therapy           Prediabetes      -A1c recently has been 6.3%. unlikely going to convert to DM. Off carb control diet  - repeat A1C around 12/2025           Anxiety and depression      -Controlled without Lexapro  - patient declines any medications.           Bradycardia - Primary      -Asymptomatic.  Not on any beta-blocker  -Chronic.   -TSH has been normal.   - per previous care team, pt refused work up for this.   - EKG didn't show any concerning arrhythmia           Bilateral leg edema      - intermittent, controlled           Primary hypertension      -Controlled.  Continue lisinopril 5 mg daily           Duodenal ulcer      - per hospitalization records, had hx of possible perforated duodenal ulcer in 2022. Continue pantoprazole 20mg daily indefinitely           Contracture of joint of lower leg      -Discussed with patient immobility causing the pain in legs (from contracture).  Receiving therapy. Advised to get out of bed daily but rarely does it.         Hepatic steatosis      On statin.   Precounselled on increasing activity but patient remains bed bound despite receiving therapy           Vaginal bleeding      - resolved.  - patient declines further work-up        Discussed case with: Geriatrics care team      Electronically signed by: Andria Dumont MD         Attestation with edits by Nguyen Ring MD at 7/29/2025  5:46 PM:  I saw and evaluated the patient.  I personally obtained the key and critical portions of the history and physical exam or was physically present for key and critical portions performed by the resident. I reviewed the resident’s documentation and discussed the patient with the resident.  I agree with the resident’s medical decision making as documented in their note with the exception/addition of the  following: see note comments in bold italics.      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   • Chronic dacryocystitis of right lacrimal sac 11/03/2023   • Colon polyps 11/03/2023   • Convulsions (Multi) 11/03/2023   • Decreased white blood cell count, unspecified 05/05/2020    WBC decreased   • Disorder of lipoprotein metabolism, 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    Cholesterol serum decreased   • Encounter for gynecological examination (general) (routine) without abnormal findings 10/17/2016    Encounter for cervical Pap smear with pelvic exam   • Encounter for screening for malignant neoplasm of cervix     Encounter for Papanicolaou smear for cervical cancer screening   • Encounter for screening for malignant neoplasm of colon 06/23/2015    Encounter for screening colonoscopy   • Erythema intertrigo 10/17/2016    Intertrigo   • 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   • Gastroesophageal reflux disease without esophagitis 10/26/2023   • Glycosuria 11/03/2021    Sugar urinary present   • History of abdominal abscess 10/26/2023   • Hyperglycemia 11/03/2023   • Hypomagnesemia 12/04/2024   • Intertrigo 11/03/2023   • Intra-abdominal abscess (Multi) 11/03/2023   • 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   • Lichen sclerosus et atrophicus 11/03/2023   • Major depressive disorder 10/26/2023   • Malignant neoplasm of thyroid gland (Multi) 06/13/2013    Malignant neoplasm of thyroid gland   • NPH (normal pressure hydrocephalus) (Multi) 11/03/2023   • Osteoarthrosis, localized, primary, knee 11/03/2023   • Other conditions  influencing health status 07/15/2015    History of cough   • Other disorders of pituitary gland     Empty sella syndrome   • Other specified cough 03/16/2020    Upper airway cough syndrome   • Other specified personal risk factors, not elsewhere classified 10/17/2016    Encounter for gynecologic examination for high-risk patient covered by Medicare   • Other symptoms and signs involving the musculoskeletal system 10/12/2021    Weakness of both lower extremities   • Pain in right hip 07/12/2021    Right hip pain   • Pain in unspecified ankle and joints of unspecified foot 11/24/2014    Ankle pain   • Pain in unspecified foot 11/24/2014    Heel pain   • Pain in unspecified foot 08/02/2021    Foot pain   • Pain in unspecified knee 12/03/2020    Knee pain   • Pain in unspecified knee 11/04/2021    Joint pain, knee   • Pain, unspecified 04/12/2022    Acute pain   • 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   • Personal history of other diseases of the female genital tract 02/21/2017    History of breast pain   • Personal history of other diseases of the nervous system and sense organs 02/25/2022    History of peripheral neuropathy   • Personal history of other diseases of the nervous system and sense organs 11/24/2014    History of conjunctivitis   • Personal history of other endocrine, nutritional and metabolic disease 06/29/2018    History of hyperglycemia   • Personal history of other endocrine, nutritional and metabolic disease 11/03/2021    History of hyperglycemia   • Personal history of other endocrine, nutritional and metabolic disease     History of hyperlipidemia   • Personal history of other medical treatment     History of screening mammography   • Personal history of other medical treatment 02/05/2020    History of screening mammography   • Personal history of other specified conditions 04/24/2018    History of urinary frequency   • Personal history of other specified conditions 04/28/2020    History of  convulsions   • Personal history of other specified conditions 10/12/2021    History of gait disorder   • Personal history of urinary (tract) infections 09/05/2019    History of urinary tract infection   • Pes planus 11/03/2023   • Physical deconditioning 10/26/2023   • Poor balance 11/03/2023   • Prediabetes 09/05/2019    Prediabetes   • Prediabetes 10/08/2021    Pre-diabetes   • Pulmonary embolism 11/03/2023   • Trochanteric bursitis of right hip 11/03/2023   • Unspecified convulsions (Multi)     Seizures   • Unspecified fracture of left toe(s), initial encounter for closed fracture 08/24/2021    Toe fracture, left   • Unspecified injury of unspecified foot, initial encounter 04/12/2021    Toe injury   [2]  Past Surgical History:  Procedure Laterality Date   • KNEE SURGERY  08/23/2013    Knee Surgery Left   • MR HEAD ANGIO WO IV CONTRAST  10/10/2020    MR HEAD ANGIO WO IV CONTRAST 10/10/2020 [facility]   • MR NECK ANGIO WO IV CONTRAST  10/10/2020    MR NECK ANGIO WO IV CONTRAST 10/10/2020 [facility]   • OTHER SURGICAL HISTORY  02/25/2022    Neck surgery   • TONSILLECTOMY  08/23/2013    Tonsillectomy   • TOTAL THYROIDECTOMY  10/21/2013    Thyroid Surgery Total Thyroidectomy   • TUBAL LIGATION  08/23/2013    Tubal Ligation   • US GUIDED PERCUTANEOUS PERITONEAL OR RETROPERITONEAL FLUID COLLECTION DRAINAGE  8/1/2022    US GUIDED PERCUTANEOUS PERITONEAL OR RETROPERITONEAL FLUID COLLECTION DRAINAGE 8/1/2022 [facility]   [3]  Family History  Problem Relation Name Age of Onset   • Memory loss Mother     • Heart disease Father     • Hyperlipidemia Father     • Depression Daughter     • Autism Son     • Depression Son     • Diabetes Maternal Grandmother     [4]  Current Outpatient Medications   Medication Sig Dispense Refill   • acetaminophen (Tylenol) 325 mg tablet Take 2 tablets (650 mg) by mouth every 4 hours if needed. DO NOT EXCEED 3000 MG IN 24 HOURS     • carboxymethylcellulose (Refresh  Plus) 0.5 % ophthalmic solution Administer 1 drop into both eyes 4 times a day.     • cholecalciferol (Vitamin D-3) 25 MCG (1000 UT) tablet Take 4 tablets (4,000 Units) by mouth once daily.     • eucerin cream Apply topically 2 times a day. To affected areas     • lacosamide (Vimpat) 100 mg tablet Take 1 tablet (100 mg) by mouth every 12 hours. 60 tablet 0   • levETIRAcetam (Keppra) 750 mg tablet Take 1 tablet (750 mg) by mouth 2 times a day. 180 tablet 3   • levothyroxine (Synthroid) 88 mcg tablet Take 1 tablet (88 mcg) by mouth once daily.     • lisinopril 5 mg tablet Take 1 tablet (5 mg) by mouth once daily.     • pantoprazole (ProtoNix) 20 mg EC tablet Take 1 tablet (20 mg) by mouth once daily in the morning. Take before meals. Do not crush, chew, or split.     • simvastatin (Zocor) 20 mg tablet Take 1 tablet (20 mg) by mouth once daily in the evening.       No current facility-administered medications for this visit.

## 2025-07-29 NOTE — PROGRESS NOTES
Division: Geriatrics  Date of Service: 7/29/2025  Visit Type: Long-Term Care Regulatory Follow-up Visit    Chief Complaint: No chief complaint on file.; 60 day visit    Subjective   History obtained from patient.  Patient seen at bedside, denies chest pain shortness of breath nausea vomiting.  She was quite dismissive and denies that she has history of seizures. She said I never had seizures before.    Only reports bilateral lower extremity pain >> chronic    Patient participatory to me. She has no complaints. No interval seizures. Does not participate to activities or get out of her room/bed a lot.     HPI   Hospitalization/ER visits: None  Memory: Baseline  Mood changes: Baseline  Sleep: No reported changes  Falls: None  Pain: Bilateral lower extremity pain  BM good  Appetite: good  Weight: no significant changes       Medical Hx reviewed. See below.   Medical History[1]  Surgical History[2]  Family History[3]  Social History     Tobacco Use    Smoking status: Never     Passive exposure: Never    Smokeless tobacco: Never   Substance Use Topics    Alcohol use: Not Currently       Allergies: Ciprofloxacin, Dilantin [phenytoin sodium extended], Codeine, Naproxen, and Phenytoin    Medications reviewed and reconciled.   Current Medications[4]    Objective   There were no vitals taken for this visit.  Vitals per [facility]:  BP: 166/63  HR: 35  RR: 22  T: 97  O2 Sat: 96%  Weight: 199.2     Physical Exam  Constitutional:       Appearance: Normal appearance.   HENT:      Head: Normocephalic and atraumatic.      Nose: Nose normal.     Cardiovascular:      Rate and Rhythm: Regular rhythm. Bradycardia present.   Pulmonary:      Effort: Pulmonary effort is normal.      Breath sounds: Normal breath sounds.   Abdominal:      General: Abdomen is flat.      Palpations: Abdomen is soft.     Musculoskeletal:         General: Signs of injury present.      Right lower leg: Edema present.      Left lower leg: Edema present.  "    Skin:     General: Skin is warm.     Neurological:      Mental Status: She is alert. Mental status is at baseline.         Labs:  Most current labs reviewed: see below for the results  Lab Results   Component Value Date    WBC 5.5 08/12/2022    HGB 8.8 (L) 08/12/2022    HCT 30.1 (L) 08/12/2022     (H) 08/12/2022     (H) 08/12/2022    LYMPHOPCT 9.7 08/03/2022    RBC 2.99 (L) 08/12/2022    MCHC 29.2 (L) 08/12/2022    RDW 13.9 08/12/2022     Lab Results   Component Value Date     08/12/2022    K 4.0 08/12/2022     08/12/2022    CO2 25 08/12/2022    BUN 9 08/12/2022    CREATININE 0.33 (L) 08/12/2022    GLUCOSE 110 (H) 08/12/2022    CALCIUM 8.5 (L) 08/12/2022    PROT 6.3 (L) 07/30/2022    BILITOT 0.6 07/30/2022    ALKPHOS 51 07/30/2022    AST 52 (H) 07/30/2022    ALT 14 07/30/2022     Lab Results   Component Value Date    TSH 23.69 (H) 08/11/2022    TSH 0.71 12/23/2020    TSH 0.47 08/26/2019     Lab Results   Component Value Date    FREET4 0.70 (L) 08/11/2022    FREET4 0.72 (L) 08/11/2022    FREET4 1.46 12/23/2020     Lab Results   Component Value Date    WNFZEKZW08 670 10/23/2021     Lab Results   Component Value Date    HGBA1C 6.6 (A) 03/26/2022    HGBA1C 6.0 (A) 11/06/2021    HGBA1C 6.2 (A) 09/24/2021     No results found for: \"VITD25\"     Imaging:  Most current imaging studies reviewed: see below for the results    Assessment/Plan    Problem List Items Addressed This Visit           ICD-10-CM    Seizure disorder (Multi) - Primary G40.909    Hyperlipidemia E78.5    Debility R53.81    Prediabetes R73.03    Anxiety and depression F41.9, F32.A    Bradycardia R00.1    Bilateral leg edema R60.0    Primary hypertension I10    Duodenal ulcer K26.9    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 Garcia  - for lacosamide and levetiracetam levels  - continue lacosamide 100mg BID and levetiracetam 750mg BID  - " controlled           Hyperlipidemia      -Contninue simvastatin 20mg nightly           Debility      - on therapy           Prediabetes      -A1c recently has been 6.3%. unlikely going to convert to DM. Off carb control diet  - repeat A1C around 12/2025           Anxiety and depression      -Controlled without Lexapro  - patient declines any medications.           Bradycardia - Primary      -Asymptomatic.  Not on any beta-blocker  -Chronic.   -TSH has been normal.   - per previous care team, pt refused work up for this.   - EKG didn't show any concerning arrhythmia           Bilateral leg edema      - intermittent, controlled           Primary hypertension      -Controlled.  Continue lisinopril 5 mg daily           Duodenal ulcer      - per hospitalization records, had hx of possible perforated duodenal ulcer in 2022. Continue pantoprazole 20mg daily indefinitely           Contracture of joint of lower leg      -Discussed with patient immobility causing the pain in legs (from contracture).  Receiving therapy. Advised to get out of bed daily but rarely does it.         Hepatic steatosis      On statin.   Precounselled on increasing activity but patient remains bed bound despite receiving therapy           Vaginal bleeding      - resolved.  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    Chronic dacryocystitis of right lacrimal sac 11/03/2023    Colon polyps 11/03/2023    Convulsions (Multi) 11/03/2023    Decreased white blood cell count, unspecified 05/05/2020    WBC decreased     Disorder of lipoprotein metabolism, 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    Cholesterol serum decreased    Encounter for gynecological examination (general) (routine) without abnormal findings 10/17/2016    Encounter for cervical Pap smear with pelvic exam    Encounter for screening for malignant neoplasm of cervix     Encounter for Papanicolaou smear for cervical cancer screening    Encounter for screening for malignant neoplasm of colon 06/23/2015    Encounter for screening colonoscopy    Erythema intertrigo 10/17/2016    Intertrigo    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    Gastroesophageal reflux disease without esophagitis 10/26/2023    Glycosuria 11/03/2021    Sugar urinary present    History of abdominal abscess 10/26/2023    Hyperglycemia 11/03/2023    Hypomagnesemia 12/04/2024    Intertrigo 11/03/2023    Intra-abdominal abscess (Multi) 11/03/2023    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    Lichen sclerosus et atrophicus 11/03/2023    Major depressive disorder 10/26/2023    Malignant neoplasm of thyroid gland (Multi) 06/13/2013    Malignant neoplasm of thyroid gland    NPH (normal pressure hydrocephalus) (Multi) 11/03/2023    Osteoarthrosis, localized, primary, knee 11/03/2023    Other conditions influencing health status 07/15/2015    History of cough    Other disorders of pituitary gland     Empty sella syndrome    Other specified cough 03/16/2020    Upper airway cough syndrome    Other specified personal risk factors, not elsewhere classified 10/17/2016    Encounter for gynecologic examination for high-risk patient covered by Medicare    Other symptoms and signs involving the musculoskeletal system 10/12/2021    Weakness of both lower extremities    Pain in right hip 07/12/2021    Right hip pain    Pain in unspecified ankle and joints of unspecified foot 11/24/2014    Ankle pain    Pain in unspecified foot 11/24/2014    Heel pain    Pain in unspecified foot 08/02/2021    Foot pain    Pain in unspecified  knee 12/03/2020    Knee pain    Pain in unspecified knee 11/04/2021    Joint pain, knee    Pain, unspecified 04/12/2022    Acute pain    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    Personal history of other diseases of the female genital tract 02/21/2017    History of breast pain    Personal history of other diseases of the nervous system and sense organs 02/25/2022    History of peripheral neuropathy    Personal history of other diseases of the nervous system and sense organs 11/24/2014    History of conjunctivitis    Personal history of other endocrine, nutritional and metabolic disease 06/29/2018    History of hyperglycemia    Personal history of other endocrine, nutritional and metabolic disease 11/03/2021    History of hyperglycemia    Personal history of other endocrine, nutritional and metabolic disease     History of hyperlipidemia    Personal history of other medical treatment     History of screening mammography    Personal history of other medical treatment 02/05/2020    History of screening mammography    Personal history of other specified conditions 04/24/2018    History of urinary frequency    Personal history of other specified conditions 04/28/2020    History of convulsions    Personal history of other specified conditions 10/12/2021    History of gait disorder    Personal history of urinary (tract) infections 09/05/2019    History of urinary tract infection    Pes planus 11/03/2023    Physical deconditioning 10/26/2023    Poor balance 11/03/2023    Prediabetes 09/05/2019    Prediabetes    Prediabetes 10/08/2021    Pre-diabetes    Pulmonary embolism 11/03/2023    Trochanteric bursitis of right hip 11/03/2023    Unspecified convulsions (Multi)     Seizures    Unspecified fracture of left toe(s), initial encounter for closed fracture 08/24/2021    Toe fracture, left    Unspecified injury of unspecified foot, initial encounter 04/12/2021    Toe injury   [2]   Past Surgical History:  Procedure  Laterality Date    KNEE SURGERY  08/23/2013    Knee Surgery Left    MR HEAD ANGIO WO IV CONTRAST  10/10/2020    MR HEAD ANGIO WO IV CONTRAST 10/10/2020 [facility]    MR NECK ANGIO WO IV CONTRAST  10/10/2020    MR NECK ANGIO WO IV CONTRAST 10/10/2020 [facility]    OTHER SURGICAL HISTORY  02/25/2022    Neck surgery    TONSILLECTOMY  08/23/2013    Tonsillectomy    TOTAL THYROIDECTOMY  10/21/2013    Thyroid Surgery Total Thyroidectomy    TUBAL LIGATION  08/23/2013    Tubal Ligation    US GUIDED PERCUTANEOUS PERITONEAL OR RETROPERITONEAL FLUID COLLECTION DRAINAGE  8/1/2022    US GUIDED PERCUTANEOUS PERITONEAL OR RETROPERITONEAL FLUID COLLECTION DRAINAGE 8/1/2022 [facility]   [3]   Family History  Problem Relation Name Age of Onset    Memory loss Mother      Heart disease Father      Hyperlipidemia Father      Depression Daughter      Autism Son      Depression Son      Diabetes Maternal Grandmother     [4]   Current Outpatient Medications   Medication Sig Dispense Refill    acetaminophen (Tylenol) 325 mg tablet Take 2 tablets (650 mg) by mouth every 4 hours if needed. DO NOT EXCEED 3000 MG IN 24 HOURS      carboxymethylcellulose (Refresh Plus) 0.5 % ophthalmic solution Administer 1 drop into both eyes 4 times a day.      cholecalciferol (Vitamin D-3) 25 MCG (1000 UT) tablet Take 4 tablets (4,000 Units) by mouth once daily.      eucerin cream Apply topically 2 times a day. To affected areas      lacosamide (Vimpat) 100 mg tablet Take 1 tablet (100 mg) by mouth every 12 hours. 60 tablet 0    levETIRAcetam (Keppra) 750 mg tablet Take 1 tablet (750 mg) by mouth 2 times a day. 180 tablet 3    levothyroxine (Synthroid) 88 mcg tablet Take 1 tablet (88 mcg) by mouth once daily.      lisinopril 5 mg tablet Take 1 tablet (5 mg) by mouth once daily.      pantoprazole (ProtoNix) 20 mg EC tablet Take 1 tablet (20 mg) by mouth once daily in the morning. Take before meals. Do not crush, chew, or split.       simvastatin (Zocor) 20 mg tablet Take 1 tablet (20 mg) by mouth once daily in the evening.       No current facility-administered medications for this visit.

## 2025-09-08 ENCOUNTER — APPOINTMENT (OUTPATIENT)
Dept: ORTHOPEDIC SURGERY | Facility: HOSPITAL | Age: 80
End: 2025-09-08
Payer: MEDICARE